# Patient Record
Sex: FEMALE | Race: NATIVE HAWAIIAN OR OTHER PACIFIC ISLANDER | NOT HISPANIC OR LATINO | Employment: PART TIME | URBAN - METROPOLITAN AREA
[De-identification: names, ages, dates, MRNs, and addresses within clinical notes are randomized per-mention and may not be internally consistent; named-entity substitution may affect disease eponyms.]

---

## 2024-11-12 ENCOUNTER — HOSPITAL ENCOUNTER (OUTPATIENT)
Dept: RADIOLOGY | Facility: MEDICAL CENTER | Age: 24
End: 2024-11-12
Attending: NURSE PRACTITIONER
Payer: COMMERCIAL

## 2024-11-12 DIAGNOSIS — R59.1 LYMPHADENOPATHY: ICD-10-CM

## 2024-11-12 LAB — PATHOLOGY CONSULT NOTE: NORMAL

## 2024-11-12 PROCEDURE — 88342 IMHCHEM/IMCYTCHM 1ST ANTB: CPT

## 2024-11-12 PROCEDURE — 88305 TISSUE EXAM BY PATHOLOGIST: CPT

## 2024-11-12 PROCEDURE — 88341 IMHCHEM/IMCYTCHM EA ADD ANTB: CPT | Mod: 91

## 2024-11-12 PROCEDURE — 88365 INSITU HYBRIDIZATION (FISH): CPT

## 2024-11-12 PROCEDURE — 38505 NEEDLE BIOPSY LYMPH NODES: CPT

## 2024-11-12 NOTE — PROGRESS NOTES
[facility] [place] [facility]IR    US guided core biopsy of Left cervical lymph node done by Dr. Carrion; NON-SEDATION (no H&P required as this is a NON SEDATION procedure) left Lateral aspect of neck access site, dressing CDI; one core in formalin and one core in RPMI obtained, Samples sent to the lab. Pt tolerated the procedure well. Pt hemodynamically stable pre/intra/post procedure; all questions and concerns answered prior to being d/c; patient provided with appropriate education for procedure; pt d/c home.

## 2024-11-20 ENCOUNTER — HOSPITAL ENCOUNTER (OUTPATIENT)
Dept: LAB | Facility: MEDICAL CENTER | Age: 24
End: 2024-11-20
Attending: NURSE PRACTITIONER
Payer: COMMERCIAL

## 2024-11-20 ENCOUNTER — HOSPITAL ENCOUNTER (OUTPATIENT)
Dept: HEMATOLOGY ONCOLOGY | Facility: MEDICAL CENTER | Age: 24
End: 2024-11-20
Attending: NURSE PRACTITIONER
Payer: COMMERCIAL

## 2024-11-20 VITALS
WEIGHT: 150.35 LBS | BODY MASS INDEX: 25.67 KG/M2 | OXYGEN SATURATION: 98 % | HEART RATE: 84 BPM | DIASTOLIC BLOOD PRESSURE: 72 MMHG | TEMPERATURE: 98 F | SYSTOLIC BLOOD PRESSURE: 116 MMHG | RESPIRATION RATE: 17 BRPM | HEIGHT: 64 IN

## 2024-11-20 DIAGNOSIS — C81.71 OTHER CLASSICAL HODGKIN LYMPHOMA OF LYMPH NODES OF NECK (HCC): ICD-10-CM

## 2024-11-20 LAB
ALBUMIN SERPL BCP-MCNC: 4.4 G/DL (ref 3.2–4.9)
ALBUMIN/GLOB SERPL: 1.2 G/DL
ALP SERPL-CCNC: 105 U/L (ref 30–99)
ALT SERPL-CCNC: 9 U/L (ref 2–50)
ANION GAP SERPL CALC-SCNC: 13 MMOL/L (ref 7–16)
AST SERPL-CCNC: 14 U/L (ref 12–45)
BASOPHILS # BLD AUTO: 0.4 % (ref 0–1.8)
BASOPHILS # BLD: 0.03 K/UL (ref 0–0.12)
BILIRUB SERPL-MCNC: 0.4 MG/DL (ref 0.1–1.5)
BUN SERPL-MCNC: 10 MG/DL (ref 8–22)
CALCIUM ALBUM COR SERPL-MCNC: 9.2 MG/DL (ref 8.5–10.5)
CALCIUM SERPL-MCNC: 9.5 MG/DL (ref 8.5–10.5)
CHLORIDE SERPL-SCNC: 100 MMOL/L (ref 96–112)
CO2 SERPL-SCNC: 23 MMOL/L (ref 20–33)
CREAT SERPL-MCNC: 0.59 MG/DL (ref 0.5–1.4)
EOSINOPHIL # BLD AUTO: 0.04 K/UL (ref 0–0.51)
EOSINOPHIL NFR BLD: 0.5 % (ref 0–6.9)
ERYTHROCYTE [DISTWIDTH] IN BLOOD BY AUTOMATED COUNT: 35.6 FL (ref 35.9–50)
ERYTHROCYTE [SEDIMENTATION RATE] IN BLOOD BY WESTERGREN METHOD: 21 MM/HOUR (ref 0–25)
GFR SERPLBLD CREATININE-BSD FMLA CKD-EPI: 128 ML/MIN/1.73 M 2
GLOBULIN SER CALC-MCNC: 3.6 G/DL (ref 1.9–3.5)
GLUCOSE SERPL-MCNC: 87 MG/DL (ref 65–99)
HBV CORE IGM SER QL: NONREACTIVE
HBV SURFACE AG SER QL: NONREACTIVE
HCT VFR BLD AUTO: 41.8 % (ref 37–47)
HGB BLD-MCNC: 13.8 G/DL (ref 12–16)
HIV 1+2 AB+HIV1 P24 AG SERPL QL IA: NORMAL
IMM GRANULOCYTES # BLD AUTO: 0.03 K/UL (ref 0–0.11)
IMM GRANULOCYTES NFR BLD AUTO: 0.4 % (ref 0–0.9)
LDH SERPL L TO P-CCNC: 174 U/L (ref 107–266)
LYMPHOCYTES # BLD AUTO: 1.26 K/UL (ref 1–4.8)
LYMPHOCYTES NFR BLD: 17.2 % (ref 22–41)
MCH RBC QN AUTO: 27.4 PG (ref 27–33)
MCHC RBC AUTO-ENTMCNC: 33 G/DL (ref 32.2–35.5)
MCV RBC AUTO: 82.9 FL (ref 81.4–97.8)
MONOCYTES # BLD AUTO: 0.47 K/UL (ref 0–0.85)
MONOCYTES NFR BLD AUTO: 6.4 % (ref 0–13.4)
NEUTROPHILS # BLD AUTO: 5.51 K/UL (ref 1.82–7.42)
NEUTROPHILS NFR BLD: 75.1 % (ref 44–72)
NRBC # BLD AUTO: 0 K/UL
NRBC BLD-RTO: 0 /100 WBC (ref 0–0.2)
PHOSPHATE SERPL-MCNC: 3.3 MG/DL (ref 2.5–4.5)
PLATELET # BLD AUTO: 405 K/UL (ref 164–446)
PMV BLD AUTO: 9.5 FL (ref 9–12.9)
POTASSIUM SERPL-SCNC: 4.6 MMOL/L (ref 3.6–5.5)
PROT SERPL-MCNC: 8 G/DL (ref 6–8.2)
RBC # BLD AUTO: 5.04 M/UL (ref 4.2–5.4)
SODIUM SERPL-SCNC: 136 MMOL/L (ref 135–145)
URATE SERPL-MCNC: 4.2 MG/DL (ref 1.9–8.2)
WBC # BLD AUTO: 7.3 K/UL (ref 4.8–10.8)

## 2024-11-20 PROCEDURE — 87340 HEPATITIS B SURFACE AG IA: CPT

## 2024-11-20 PROCEDURE — 84550 ASSAY OF BLOOD/URIC ACID: CPT

## 2024-11-20 PROCEDURE — 85652 RBC SED RATE AUTOMATED: CPT

## 2024-11-20 PROCEDURE — 99212 OFFICE O/P EST SF 10 MIN: CPT | Performed by: NURSE PRACTITIONER

## 2024-11-20 PROCEDURE — 85025 COMPLETE CBC W/AUTO DIFF WBC: CPT

## 2024-11-20 PROCEDURE — 36415 COLL VENOUS BLD VENIPUNCTURE: CPT

## 2024-11-20 PROCEDURE — 84100 ASSAY OF PHOSPHORUS: CPT

## 2024-11-20 PROCEDURE — 82232 ASSAY OF BETA-2 PROTEIN: CPT

## 2024-11-20 PROCEDURE — 87389 HIV-1 AG W/HIV-1&-2 AB AG IA: CPT

## 2024-11-20 PROCEDURE — 83615 LACTATE (LD) (LDH) ENZYME: CPT

## 2024-11-20 PROCEDURE — 86705 HEP B CORE ANTIBODY IGM: CPT

## 2024-11-20 PROCEDURE — 80053 COMPREHEN METABOLIC PANEL: CPT

## 2024-11-20 PROCEDURE — 99214 OFFICE O/P EST MOD 30 MIN: CPT | Performed by: NURSE PRACTITIONER

## 2024-11-20 ASSESSMENT — ENCOUNTER SYMPTOMS
NERVOUS/ANXIOUS: 1
CHILLS: 0
FEVER: 0
WEIGHT LOSS: 0
DIAPHORESIS: 0

## 2024-11-20 ASSESSMENT — PAIN SCALES - GENERAL: PAINLEVEL_OUTOF10: NO PAIN

## 2024-11-20 ASSESSMENT — FIBROSIS 4 INDEX: FIB4 SCORE: 0.29

## 2024-11-20 NOTE — PROGRESS NOTES
Geovanny Mireles is a 24 y.o. female who presents with Results ([facility] for Lymphnode BX results)          HPI    Patient seen today in follow-up for biopsy results.  She presents accompanied by her mother for today's visit.    Patient originally seen back on 10/31/2024 after referral from PCP for lymphadenopathy.  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  She thought it might have been related to an illness but the lump never went away.  Therefore she established with primary care provider and was sent for an ultrasound.  She stated at that time she was not experiencing any significant pain, or dysphagia.  She did have a little bit of tenderness just when she would touch it.  Ultrasound completed on 8/8/2024 showed several mildly enlarged lymph nodes in the left neck measuring up to 1.1 cm short axis and 3.1 cm long axis.  Statistically they were thought to be reactive but close clinical follow-up was advised.  She did follow-up with her PCP approximately 2 months later and the lump was still present.  She stated it had not gotten any smaller and was essentially either stable or slightly bigger.  She does note that it protrudes on some days when she turns her neck.  Therefore PCP sent her for CT soft tissue neck with contrast on 10/25/2024.  CT showed a 2.6 x 1.7 x 2.4 cm heterogeneous enlarged node in the left lower neck.  Additional abnormal enlarged lymph nodes noted at the level 2 and level 3 as well as left supraclavicular region.  There were also partially visualized necrotic lymph nodes/mass in the anterior mediastinum.  Radiologist did state this is suspicious for metastasis or lymphoma.  Based on these findings I did recommend moving forward with a core biopsy.    Core biopsy was completed on 11/12/2024.  Patient stated she tolerated the procedure fairly well.  She has some residual tenderness for approximately 1  "week but that has resolved.  She continues to deny any dysphagia.  Lymphadenopathy still present on physical exam.  She did develop a significant rash that was along the upper chest neck and face that lasted approximately 7 days but has resolved since she was last seen.    Pathology report was reviewed in detail which is consistent with a classic Hodgkin's lymphoma.  I discussed the results in detail with the patient and her mother today.      Allergies   Allergen Reactions    Codeine      RASH     Current Outpatient Medications on File Prior to Encounter   Medication Sig Dispense Refill    Levonorgestrel (MIRENA, 52 MG, IU) by Intrauterine route. MG unknown       No current facility-administered medications on file prior to encounter.       Review of Systems   Constitutional:  Negative for chills, diaphoresis, fever, malaise/fatigue and weight loss.   Skin:  Negative for itching and rash.        Rash and itching present x1 week on face, neck, and chest - but has resolved     Psychiatric/Behavioral:  The patient is nervous/anxious.               Objective     /72 (BP Location: Right arm, Patient Position: Sitting, BP Cuff Size: Adult)   Pulse 84   Temp 36.7 °C (98 °F) (Temporal)   Resp 17   Ht 1.626 m (5' 4.02\")   Wt 68.2 kg (150 lb 5.7 oz)   LMP 10/20/2024 (Approximate)   SpO2 98%   BMI 25.80 kg/m²      Physical Exam  Vitals reviewed.   Constitutional:       General: She is not in acute distress.     Appearance: Normal appearance. She is not diaphoretic.   HENT:      Head: Normocephalic and atraumatic.   Cardiovascular:      Rate and Rhythm: Normal rate and regular rhythm.      Heart sounds: Normal heart sounds. No murmur heard.     No friction rub. No gallop.   Pulmonary:      Effort: Pulmonary effort is normal. No respiratory distress.      Breath sounds: Normal breath sounds. No wheezing.   Musculoskeletal:         General: Normal range of motion.   Lymphadenopathy:      Head:      Right side of " head: No submental, submandibular, tonsillar, preauricular, posterior auricular or occipital adenopathy.      Left side of head: No submental, submandibular, tonsillar, preauricular, posterior auricular or occipital adenopathy.      Cervical: Cervical adenopathy present.      Right cervical: No superficial, deep or posterior cervical adenopathy.     Left cervical: Superficial cervical adenopathy and deep cervical adenopathy present. No posterior cervical adenopathy.      Upper Body:      Right upper body: No supraclavicular adenopathy.      Left upper body: No supraclavicular adenopathy.   Skin:     General: Skin is warm and dry.   Neurological:      Mental Status: She is alert and oriented to person, place, and time.   Psychiatric:         Mood and Affect: Mood normal.         Behavior: Behavior normal.            IMAGING   CT-SOFT TISSUE NECK WITH      Result Date: 10/25/2024  10/25/2024 8:50 AM HISTORY/REASON FOR EXAM:  lymphadenopathy TECHNIQUE/EXAM DESCRIPTION AND NUMBER OF VIEWS:  CT soft tissue neck with contrast. The study was performed on a helical multidetector CT scanner. Contiguous thin section helical images were obtained of the neck from the skull base through the thoracic inlet. 80 mL of Omnipaque 350 nonionic contrast was injected intravenously. Low dose optimization technique was utilized for this CT exam including automated exposure control and adjustment of the mA and/or kV according to patient size. COMPARISON: None. FINDINGS: PARANASAL SINUSES: The paranasal sinuses are clear. CERVICAL spine: There is no significant cervical spine abnormality. NODES: There is a 2.6 x 1.7 x 2.4 cm heterogeneous enlarged lymph nodes in the left lower neck. Additional abnormal enlarged lymph nodes in the left level 2, level 3 and supraclavicular region as well. SALIVARY and THYROID gland: The parotid, submandibular, and thyroid gland are normal in appearance. AIRWAY: The airway appears patent. MEDIASTINUM: There  are partially visualized necrotic lymph nodes/mass in the anterior mediastinum LUNGS: The visualized portions of lungs are clear.      1. There is a 2.6 x 1.7 x 2.4 cm heterogeneous enlarged lymph nodes in the left lower neck. Additional abnormal enlarged lymph nodes in the left level 2, level 3 and left supraclavicular region as well. These are suspicious for metastasis or lymphoma. 2. There are partially visualized necrotic lymph nodes/mass in the anterior mediastinum         US-SOFT TISSUES OF HEAD - NECK    08/08/2024     IMPRESSION:  Several mildly enlarged lymph nodes in the left neck, in the areas of palpable concern. They are statistically reactive rather than neoplastic, and close clinical follow-up is advised.       PATHOLOGY  FINAL DIAGNOSIS:   11/12/24  A. Left cervical lymph node:          Lymph node cores demonstrating classic Hodgkin lymphoma with           bands of fibrosis noted.     PRECURSOR AND MATURE LYMPHOID MALIGNANCIES     TUMOR    Site(s) of Tumor Involvement in Sample:  Lymph node     Final Integrated Diagnosis:  Classic Hodgkin lymphoma   SPECIAL STUDIES     Immunohistochemistry:  Please refer to microscopic description below     for details.     Flow Cytometry:  No aberrancy detected at level of sensitivity of     assay     Conventional Cytogenetics:  Not performed     Fluorescence in situ Hybridization:  Not performed     Molecular Alterations Detected:  Not performed.     Comment: This case has been reviewed by a second pathologist, Dr. Ivan, who concurs with the diagnosis.          Assessment & Plan     1. Other classical Hodgkin lymphoma of lymph nodes of neck (HCC)  BETA-2-MICROGLOBULIN    CBC WITH DIFFERENTIAL    Comp Metabolic Panel    [id number] BASE TO MID-THIGH    EC-ECHOCARDIOGRAM COMPLETE W/O CONT    HEP B CORE AB IGM    HEP B SURFACE ANTIGEN    HEPATITIS C RNA QUANT.    HIV AG/AB COMBO ASSAY SCREENING    LDH    PHOSPHORUS    URIC ACID    Referral to Hematology  Oncology    Sed Rate              Patient new diagnosis of classic Hodgkin's lymphoma.  Discussed in detail with patient and mother the recommendation for further staging workup and preparation for treatment includin.  Referral placed to Dr. Silver, medical oncology -patient to see Dr. Silver on 2024  2.  PET/CT ordered to complete staging workup -scheduled for 2024  3.  Echocardiogram -2024  4.  Multiple labs ordered at the request of Dr. Silver in preparation for upcoming treatment as noted above.    Discussed in detail with the patient and mother the recommendations.  She did verbalize understanding's and agreement with the plan.      Please note that this dictation was created using voice recognition software. I have made every reasonable attempt to correct obvious errors, but I expect that there are errors of grammar and possibly content that I did not discover before finalizing the note.

## 2024-11-21 ENCOUNTER — HOSPITAL ENCOUNTER (OUTPATIENT)
Dept: CARDIOLOGY | Facility: MEDICAL CENTER | Age: 24
End: 2024-11-21
Attending: NURSE PRACTITIONER
Payer: COMMERCIAL

## 2024-11-21 ENCOUNTER — HOSPITAL ENCOUNTER (OUTPATIENT)
Dept: LAB | Facility: MEDICAL CENTER | Age: 24
End: 2024-11-21
Attending: NURSE PRACTITIONER
Payer: COMMERCIAL

## 2024-11-21 DIAGNOSIS — C81.71 OTHER CLASSICAL HODGKIN LYMPHOMA OF LYMPH NODES OF NECK (HCC): ICD-10-CM

## 2024-11-21 LAB
LV EJECT FRACT  99904: 65
LV EJECT FRACT MOD 2C 99903: 61.52
LV EJECT FRACT MOD 4C 99902: 68.6
LV EJECT FRACT MOD BP 99901: 65.07

## 2024-11-21 PROCEDURE — 36415 COLL VENOUS BLD VENIPUNCTURE: CPT

## 2024-11-21 PROCEDURE — 93306 TTE W/DOPPLER COMPLETE: CPT | Mod: 26 | Performed by: INTERNAL MEDICINE

## 2024-11-21 PROCEDURE — 93306 TTE W/DOPPLER COMPLETE: CPT

## 2024-11-21 PROCEDURE — 87522 HEPATITIS C REVRS TRNSCRPJ: CPT

## 2024-11-22 LAB — B2 MICROGLOB SERPL-MCNC: 1.4 MG/L (ref 0.8–2.4)

## 2024-11-25 ENCOUNTER — HOSPITAL ENCOUNTER (OUTPATIENT)
Dept: RADIOLOGY | Facility: MEDICAL CENTER | Age: 24
End: 2024-11-25
Attending: NURSE PRACTITIONER
Payer: COMMERCIAL

## 2024-11-25 DIAGNOSIS — C81.71 OTHER CLASSICAL HODGKIN LYMPHOMA OF LYMPH NODES OF NECK (HCC): ICD-10-CM

## 2024-11-25 LAB — GLUCOSE BLD-MCNC: 84 MG/DL (ref 65–99)

## 2024-11-25 PROCEDURE — A9552 F18 FDG: HCPCS

## 2024-11-27 LAB
HCV RNA SERPL NAA+PROBE-ACNC: NOT DETECTED IU/ML
HCV RNA SERPL NAA+PROBE-LOG IU: NOT DETECTED LOG IU/ML
HCV RNA SERPL QL NAA+PROBE: NOT DETECTED

## 2024-12-01 ASSESSMENT — ENCOUNTER SYMPTOMS
TINGLING: 0
CHILLS: 0
DIARRHEA: 0
HEADACHES: 0
NAUSEA: 0
INSOMNIA: 0
FEVER: 0
BLOOD IN STOOL: 0
WHEEZING: 0
SHORTNESS OF BREATH: 0
BRUISES/BLEEDS EASILY: 0
DIZZINESS: 0
ABDOMINAL PAIN: 0
DOUBLE VISION: 0
BACK PAIN: 0
WEIGHT LOSS: 0
SINUS PAIN: 0
HEARTBURN: 0
MYALGIAS: 0
SORE THROAT: 0
WEAKNESS: 0
BLURRED VISION: 0
CONSTIPATION: 0
SPUTUM PRODUCTION: 0
COUGH: 0
PALPITATIONS: 0
NERVOUS/ANXIOUS: 0
VOMITING: 0
ORTHOPNEA: 0

## 2024-12-02 ENCOUNTER — HOSPITAL ENCOUNTER (OUTPATIENT)
Dept: HEMATOLOGY ONCOLOGY | Facility: MEDICAL CENTER | Age: 24
End: 2024-12-02
Attending: STUDENT IN AN ORGANIZED HEALTH CARE EDUCATION/TRAINING PROGRAM
Payer: COMMERCIAL

## 2024-12-02 VITALS
HEIGHT: 64 IN | OXYGEN SATURATION: 99 % | WEIGHT: 149.8 LBS | SYSTOLIC BLOOD PRESSURE: 110 MMHG | TEMPERATURE: 97.9 F | BODY MASS INDEX: 25.57 KG/M2 | HEART RATE: 94 BPM | DIASTOLIC BLOOD PRESSURE: 62 MMHG

## 2024-12-02 DIAGNOSIS — C81.92 HODGKIN LYMPHOMA OF INTRATHORACIC LYMPH NODES, UNSPECIFIED HODGKIN LYMPHOMA TYPE (HCC): ICD-10-CM

## 2024-12-02 PROCEDURE — 99212 OFFICE O/P EST SF 10 MIN: CPT | Performed by: STUDENT IN AN ORGANIZED HEALTH CARE EDUCATION/TRAINING PROGRAM

## 2024-12-02 PROCEDURE — 99215 OFFICE O/P EST HI 40 MIN: CPT | Performed by: STUDENT IN AN ORGANIZED HEALTH CARE EDUCATION/TRAINING PROGRAM

## 2024-12-02 RX ORDER — ONDANSETRON 2 MG/ML
4 INJECTION INTRAMUSCULAR; INTRAVENOUS PRN
Status: CANCELLED | OUTPATIENT
Start: 2024-12-16

## 2024-12-02 RX ORDER — 0.9 % SODIUM CHLORIDE 0.9 %
10 VIAL (ML) INJECTION PRN
Status: CANCELLED | OUTPATIENT
Start: 2024-12-15

## 2024-12-02 RX ORDER — 0.9 % SODIUM CHLORIDE 0.9 %
VIAL (ML) INJECTION PRN
Status: CANCELLED | OUTPATIENT
Start: 2024-12-16

## 2024-12-02 RX ORDER — PROCHLORPERAZINE MALEATE 10 MG
10 TABLET ORAL EVERY 6 HOURS PRN
OUTPATIENT
Start: 2024-12-30

## 2024-12-02 RX ORDER — EPINEPHRINE 1 MG/ML(1)
0.5 AMPUL (ML) INJECTION PRN
Status: CANCELLED | OUTPATIENT
Start: 2024-12-16

## 2024-12-02 RX ORDER — DEXAMETHASONE SODIUM PHOSPHATE 4 MG/ML
12 INJECTION, SOLUTION INTRA-ARTICULAR; INTRALESIONAL; INTRAMUSCULAR; INTRAVENOUS; SOFT TISSUE ONCE
OUTPATIENT
Start: 2024-12-30 | End: 2024-12-30

## 2024-12-02 RX ORDER — ACETAMINOPHEN 325 MG/1
650 TABLET ORAL ONCE
OUTPATIENT
Start: 2024-12-30 | End: 2024-12-30

## 2024-12-02 RX ORDER — ONDANSETRON 8 MG/1
8 TABLET, ORALLY DISINTEGRATING ORAL PRN
OUTPATIENT
Start: 2024-12-30

## 2024-12-02 RX ORDER — DIPHENHYDRAMINE HYDROCHLORIDE 50 MG/ML
50 INJECTION INTRAMUSCULAR; INTRAVENOUS PRN
Status: CANCELLED | OUTPATIENT
Start: 2024-12-16

## 2024-12-02 RX ORDER — PROCHLORPERAZINE MALEATE 10 MG
10 TABLET ORAL EVERY 6 HOURS PRN
Status: CANCELLED | OUTPATIENT
Start: 2024-12-16

## 2024-12-02 RX ORDER — 0.9 % SODIUM CHLORIDE 0.9 %
3 VIAL (ML) INJECTION PRN
Status: CANCELLED | OUTPATIENT
Start: 2024-12-16

## 2024-12-02 RX ORDER — SODIUM CHLORIDE 9 MG/ML
INJECTION, SOLUTION INTRAVENOUS CONTINUOUS
Status: CANCELLED | OUTPATIENT
Start: 2024-12-16

## 2024-12-02 RX ORDER — 0.9 % SODIUM CHLORIDE 0.9 %
3 VIAL (ML) INJECTION PRN
Status: CANCELLED | OUTPATIENT
Start: 2024-12-15

## 2024-12-02 RX ORDER — SODIUM CHLORIDE 9 MG/ML
INJECTION, SOLUTION INTRAVENOUS CONTINUOUS
OUTPATIENT
Start: 2024-12-30

## 2024-12-02 RX ORDER — ONDANSETRON 8 MG/1
8 TABLET, ORALLY DISINTEGRATING ORAL PRN
Status: CANCELLED | OUTPATIENT
Start: 2024-12-16

## 2024-12-02 RX ORDER — 0.9 % SODIUM CHLORIDE 0.9 %
VIAL (ML) INJECTION PRN
OUTPATIENT
Start: 2024-12-30

## 2024-12-02 RX ORDER — DIPHENHYDRAMINE HYDROCHLORIDE 50 MG/ML
25 INJECTION INTRAMUSCULAR; INTRAVENOUS ONCE
OUTPATIENT
Start: 2024-12-30 | End: 2024-12-30

## 2024-12-02 RX ORDER — EPINEPHRINE 1 MG/ML(1)
0.5 AMPUL (ML) INJECTION PRN
OUTPATIENT
Start: 2024-12-30

## 2024-12-02 RX ORDER — METHYLPREDNISOLONE SODIUM SUCCINATE 125 MG/2ML
125 INJECTION, POWDER, LYOPHILIZED, FOR SOLUTION INTRAMUSCULAR; INTRAVENOUS PRN
Status: CANCELLED | OUTPATIENT
Start: 2024-12-16

## 2024-12-02 RX ORDER — 0.9 % SODIUM CHLORIDE 0.9 %
VIAL (ML) INJECTION PRN
Status: CANCELLED | OUTPATIENT
Start: 2024-12-15

## 2024-12-02 RX ORDER — 0.9 % SODIUM CHLORIDE 0.9 %
10 VIAL (ML) INJECTION PRN
OUTPATIENT
Start: 2024-12-30

## 2024-12-02 RX ORDER — DIPHENHYDRAMINE HYDROCHLORIDE 50 MG/ML
25 INJECTION INTRAMUSCULAR; INTRAVENOUS ONCE
Status: CANCELLED | OUTPATIENT
Start: 2024-12-16 | End: 2024-12-16

## 2024-12-02 RX ORDER — 0.9 % SODIUM CHLORIDE 0.9 %
3 VIAL (ML) INJECTION PRN
OUTPATIENT
Start: 2024-12-30

## 2024-12-02 RX ORDER — ONDANSETRON 2 MG/ML
4 INJECTION INTRAMUSCULAR; INTRAVENOUS PRN
OUTPATIENT
Start: 2024-12-30

## 2024-12-02 RX ORDER — PALONOSETRON 0.05 MG/ML
0.25 INJECTION, SOLUTION INTRAVENOUS ONCE
OUTPATIENT
Start: 2024-12-30 | End: 2024-12-30

## 2024-12-02 RX ORDER — DIPHENHYDRAMINE HYDROCHLORIDE 50 MG/ML
50 INJECTION INTRAMUSCULAR; INTRAVENOUS PRN
OUTPATIENT
Start: 2024-12-30

## 2024-12-02 RX ORDER — DEXAMETHASONE SODIUM PHOSPHATE 4 MG/ML
12 INJECTION, SOLUTION INTRA-ARTICULAR; INTRALESIONAL; INTRAMUSCULAR; INTRAVENOUS; SOFT TISSUE ONCE
Status: CANCELLED | OUTPATIENT
Start: 2024-12-16 | End: 2024-12-16

## 2024-12-02 RX ORDER — 0.9 % SODIUM CHLORIDE 0.9 %
10 VIAL (ML) INJECTION PRN
Status: CANCELLED | OUTPATIENT
Start: 2024-12-16

## 2024-12-02 RX ORDER — PALONOSETRON 0.05 MG/ML
0.25 INJECTION, SOLUTION INTRAVENOUS ONCE
Status: CANCELLED | OUTPATIENT
Start: 2024-12-16 | End: 2024-12-16

## 2024-12-02 RX ORDER — ACETAMINOPHEN 325 MG/1
650 TABLET ORAL ONCE
Status: CANCELLED | OUTPATIENT
Start: 2024-12-16 | End: 2024-12-16

## 2024-12-02 RX ORDER — METHYLPREDNISOLONE SODIUM SUCCINATE 125 MG/2ML
125 INJECTION, POWDER, LYOPHILIZED, FOR SOLUTION INTRAMUSCULAR; INTRAVENOUS PRN
OUTPATIENT
Start: 2024-12-30

## 2024-12-02 ASSESSMENT — ENCOUNTER SYMPTOMS: DIAPHORESIS: 1

## 2024-12-02 ASSESSMENT — FIBROSIS 4 INDEX: FIB4 SCORE: 0.29

## 2024-12-02 ASSESSMENT — PAIN SCALES - GENERAL: PAINLEVEL_OUTOF10: NO PAIN

## 2024-12-02 NOTE — PROGRESS NOTES
Consult:  Hematology/Oncology      Referring Physician: MALISSA Suarez  Primary Care:  Margie Rhodes P.A.-C.    Diagnosis: Classical Hodgkin's Lymphoma    Chief Complaint:  Evaluation for systemic therapy    History of Presenting Illness:  Albert Gillette is a 24 y.o. [place] woman who presents to the clinic for evaluation for systemic therapy for a new diagnosis of classical Hodgkin's lymphoma. She noticed a lump in her neck at the beginning of August and ultimately was referred to our intake oncology coordinator after US showed lymphadenopathy in the left neck. She had it monitored and they did not change in size at all, but did protrude with certain movements. She also reports having significant night sweats and fatigue over the past several months. She was sent for a CT scan of the soft tissue/neck which revealed more nodes in the anterior mediastinum. She ultimately went through a core biopsy which revealed classical Hodgkin's lymphoma. She underwent a PET scan which revealed stage IIA disease. She presents for evaluation accordingly.     Interval History:  Patient is here for consultation. She continues to have night sweats and fatigue but otherwise is feeling okay. She understands the disease process and is ready for next steps with potential treatment.     Past Medical History:   Diagnosis Date    Cancer (HCC)     Lymphoma (HCC)        Past Surgical History:   Procedure Laterality Date    DENTAL EXTRACTION(S) Bilateral     [place]       Social History     Socioeconomic History    Marital status: Single     Spouse name: Not on file    Number of children: Not on file    Years of education: Not on file    Highest education level: Not on file   Occupational History    Not on file   Tobacco Use    Smoking status: Never    Smokeless tobacco: Never   Vaping Use    Vaping status: Never Used   Substance and Sexual Activity    Alcohol use: Yes     Comment: occ    Drug use: Not Currently     Types:  Inhaled, Marijuana     Comment: Stopped a month or two ago    Sexual activity: Yes     Birth control/protection: I.U.D.     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       Allergies as of 12/02/2024 - Reviewed 12/02/2024   Allergen Reaction Noted    Codeine  07/24/2024         Current Outpatient Medications:     Levonorgestrel (MIRENA, 52 MG, IU), by Intrauterine route. MG unknown, Disp: , Rfl:     Review of Systems:  Review of Systems   Constitutional:  Positive for diaphoresis and malaise/fatigue. Negative for chills, fever and weight loss.   HENT:  Negative for hearing loss, nosebleeds, sinus pain and sore throat.    Eyes:  Negative for blurred vision and double vision.   Respiratory:  Negative for cough, sputum production, shortness of breath and wheezing.    Cardiovascular:  Negative for chest pain, palpitations, orthopnea and leg swelling.   Gastrointestinal:  Negative for abdominal pain, blood in stool, constipation, diarrhea, heartburn, melena, nausea and vomiting.   Genitourinary:  Negative for dysuria, frequency, hematuria and urgency.   Musculoskeletal:  Negative for back pain, joint pain and myalgias.   Skin:  Negative for rash.   Neurological:  Negative for dizziness, tingling, weakness and headaches.   Endo/Heme/Allergies:  Does not bruise/bleed easily.  "  Psychiatric/Behavioral:  The patient is not nervous/anxious and does not have insomnia.           Physical Exam:  Vitals:    12/02/24 1546   BP: 110/62   Pulse: 94   Temp: 36.6 °C (97.9 °F)   TempSrc: Temporal   SpO2: 99%   Weight: 67.9 kg (149 lb 12.8 oz)   Height: 1.626 m (5' 4.02\")       DESC; KARNOFSKY SCALE WITH ECOG EQUIVALENT: 100, Fully active, able to carry on all pre-disease performed without restriction (ECOG equivalent 0)    DISTRESS LEVEL: no apparent distress    Physical Exam  Vitals and nursing note reviewed.   Constitutional:       General: She is awake. She is not in acute distress.     Appearance: Normal appearance. She is normal weight. She is not ill-appearing, toxic-appearing or diaphoretic.   HENT:      Head: Normocephalic and atraumatic.      Nose: Nose normal. No congestion.      Mouth/Throat:      Pharynx: Oropharynx is clear. No oropharyngeal exudate or posterior oropharyngeal erythema.   Eyes:      General: No scleral icterus.     Extraocular Movements: Extraocular movements intact.      Conjunctiva/sclera: Conjunctivae normal.      Pupils: Pupils are equal, round, and reactive to light.   Cardiovascular:      Rate and Rhythm: Normal rate and regular rhythm.      Pulses: Normal pulses.      Heart sounds: Normal heart sounds. No murmur heard.     No friction rub. No gallop.   Pulmonary:      Effort: Pulmonary effort is normal.      Breath sounds: Normal breath sounds. No decreased air movement. No wheezing, rhonchi or rales.   Abdominal:      General: Bowel sounds are normal. There is no distension.      Tenderness: There is no abdominal tenderness.   Musculoskeletal:         General: No deformity. Normal range of motion.      Cervical back: Normal range of motion and neck supple. No tenderness.      Right lower leg: No edema.      Left lower leg: No edema.   Lymphadenopathy:      Cervical: Cervical adenopathy present.      Right cervical: No superficial cervical adenopathy.     Left " cervical: Superficial cervical adenopathy present.      Upper Body:      Right upper body: No axillary adenopathy.      Left upper body: No axillary adenopathy.      Lower Body: No right inguinal adenopathy. No left inguinal adenopathy.   Skin:     General: Skin is warm and dry.      Coloration: Skin is not jaundiced.      Findings: No erythema or rash.   Neurological:      General: No focal deficit present.      Mental Status: She is alert and oriented to person, place, and time.      Sensory: Sensation is intact.      Motor: Motor function is intact. No weakness.      Gait: Gait is intact.   Psychiatric:         Attention and Perception: Attention normal.         Mood and Affect: Mood normal.         Behavior: Behavior normal. Behavior is cooperative.         Thought Content: Thought content normal.         Judgment: Judgment normal.          Depression Screening    Little interest or pleasure in doing things?      Feeling down, depressed , or hopeless?     Trouble falling or staying asleep, or sleeping too much?      Feeling tired or having little energy?      Poor appetite or overeating?      Feeling bad about yourself - or that you are a failure or have let yourself or your family down?     Trouble concentrating on things, such as reading the newspaper or watching television?     Moving or speaking so slowly that other people could have noticed.  Or the opposite - being so fidgety or restless that you have been moving around a lot more than usual?      Thoughts that you would be better off dead, or of hurting yourself?      Patient Health Questionnaire Score:         If depressive symptoms identified deferred to follow up visit unless specifically addressed in assesment and plan.    Interpretation of PHQ-9 Total Score   Score Severity   1-4 No Depression   5-9 Mild Depression   10-14 Moderate Depression   15-19 Moderately Severe Depression   20-27 Severe Depression    Labs:  No visits with results within 7  Day(s) from this visit.   Latest known visit with results is:   Hospital Outpatient Visit on 11/25/2024   Component Date Value Ref Range Status    Glucose - Accu-Ck 11/25/2024 84  65 - 99 mg/dL Final       Imaging:   All listed images below have been independently reviewed by me. I agree with the findings as summarized below:    HE-[id number]-[id number] BASE TO MID-THIGH    Result Date: 11/25/2024 11/25/2024 8:15 AM HISTORY/REASON FOR EXAM:  new diagnosis of lymphoma; New diagnosis of Hodgkin's Lymphoma TECHNIQUE/EXAM DESCRIPTION AND NUMBER OF VIEWS: PET body imaging. Initially, 9.55 mCi F-18 FDG was administered intravenously under standardized conditions. Approximately 45 minutes after FDG administration, the patient was placed in the supine position on the PET CT table. Blood glucose level was 84 mg/dL. Low dose spiral CT imaging was performed from the skull apex to the mid thighs. PET imaging was then performed from the skull apex to the mid thighs. CT images, PET images, and PET/CT fused images were reviewed on a PACS 3D workstation. The limited non-contrast CT data are used primarily for attenuation correction and anatomic correlation.  Evaluation of solid organs and bowel are especially limited utilizing this technique. COMPARISON: CT soft tissue neck 10/25/2024 FINDINGS: Head and neck: Multiple hypermetabolic LEFT lower cervical and bilateral supraclavicular lymph nodes, max SUV 17.88. Chest: Hypermetabolic prevascular and superior mediastinal lymph nodes, max SUV 11.63.  Increased soft tissue in the anterior mediastinum with heterogeneous increased activity, max SUV 8.63.  No abnormal activity in the lungs. Abdomen and pelvis: No abnormal focal FDG activity.  Physiologic uptake uptake in the endometrium. Musculoskeletal: No abnormal focal FDG activity. Incidental findings on CT: Multiple enlarged supraclavicular lymph nodes, primarily on the LEFT.  Prominent thymus versus anterior mediastinal mass.     1.  Multiple  hypermetabolic LEFT lower cervical, bilateral supraclavicular and mediastinal lymph nodes consistent with tumor in this patient with Hodgkin's lymphoma. 2.  Probable thymic involvement. 3.  No abnormal metabolic activity in the abdomen or pelvis. Deauville score of 5    EC-ECHOCARDIOGRAM COMPLETE W/O CONT    Result Date: 2024  Transthoracic Echo Report Echocardiography Laboratory CONCLUSIONS No prior study is available for comparison. Normal left ventricular systolic function. The left ventricular ejection fraction is visually estimated to be 60- 65%. No significant valvular abnormalities. DYLON GAMBLE Exam Date:         2024                    15:13 Exam Location:     Out Patient Priority:          Routine Ordering Physician:        BALA INIGUEZ Referring Physician:       [id number] Hatfield Sonographer:               Neymar Quiñones Age:    24     Gender:    F MRN:    [id number] :    2000 BSA:    1.73   Ht (in):    64     Wt (lb):    150 Exam Type:     Complete Indications:     New diagnosis of Hodgkin's Lymphoma - need for chemo ICD Codes:       C81.7 CPT Codes:       82273 BP:   131    /   82     HR:   82 Technical Quality:       Fair MEASUREMENTS  (Male / Female) Normal Values 2D ECHO LV Diastolic Diameter PLAX        3.8 cm                4.2 - 5.9 / 3.9 - 5.3 cm LV Systolic Diameter PLAX         2.7 cm                2.1 - 4.0 cm IVS Diastolic Thickness           1.1 cm                LVPW Diastolic Thickness          1.1 cm                LVOT Diameter                     2.2 cm                Estimated LV Ejection Fraction    65 %                  LV Ejection Fraction MOD BP       65.1 %                >= 55  % LV Ejection Fraction MOD 4C       68.6 %                LV Ejection Fraction MOD 2C       61.5 %                IVC Diameter                      1.5 cm                DOPPLER AV Peak Velocity                  1.3 m/s               AV Peak Gradient                  7 mmHg                 AV Mean Gradient                  3.4 mmHg              LVOT Peak Velocity                1.1 m/s               AV Area Cont Eq vti               3.2 cm2               Mitral E Point Velocity           0.98 m/s              Mitral E to A Ratio               1.6                   MV Pressure Half Time             59.6 ms               MV Area PHT                       3.7 cm2               MV Deceleration Time              206 ms                PV Peak Velocity                  1.1 m/s               PV Peak Gradient                  5.2 mmHg              RVOT Peak Velocity                0.92 m/s              * Indicates values subject to auto-interpretation LV EF:  65    % FINDINGS Left Ventricle Normal left ventricular chamber size. Normal left ventricular wall thickness. Normal left ventricular systolic function. The left ventricular ejection fraction is visually estimated to be 60-65%. The ejection fraction is measured to be 65% by Alvarez's biplane. Normal regional wall motion. Normal diastolic function. Right Ventricle Normal right ventricular size. Normal right ventricular systolic function. Right Atrium Normal right atrial size. Left Atrium Normal left atrial size. Left atrial volume index is 24 mL/sq m. Mitral Valve Structurally normal mitral valve. No mitral stenosis. No mitral regurgitation. Aortic Valve Structurally normal aortic valve. No aortic valve stenosis. No aortic insufficiency. Tricuspid Valve Structurally normal tricuspid valve. No tricuspid stenosis. No tricuspid regurgitation. Right atrial pressure is estimated to be 3 mmHg. Unable to estimate pulmonary artery pressure due to an inadequate tricuspid regurgitant jet. Pulmonic Valve Structurally normal pulmonic valve. No pulmonic stenosis. Trace pulmonic insufficiency. Pericardium No pericardial effusion. Aorta Normal aortic root for body surface area. The ascending aorta diameter is 2.5 cm. Alonso Armendariz M.D. (Electronically Signed)  Final Date:     21 November 2024                 15:46    IR-NEEDLE CORE BX-LYMPH NODE    Result Date: 11/14/2024 11/12/2024 10:00 AM HISTORY/REASON FOR EXAM:  left neck adenopathy; left neck adenopathy concerning for malignancy - Lymphoma high on the differential . Procedure: After the informed consent the patient was placed on the ultrasound table on supine position. Ultrasound examination of the neck demonstrates enlarged left supraclavicular lymph nodes. After injecting lidocaine a 17-gauge core biopsy needle was advanced into the one of the supraclavicular lymph node. After confirming the needle position, 2 core biopsies were obtained. The materials were sent to the pathology in the formalin and RPMI container. The patient tolerated the procedure without any immediate complication.     Successful core biopsy of enlarged left supraclavicular lymphadenopathy.       Pathology:  FINAL DIAGNOSIS:     A. Left cervical lymph node:          Lymph node cores demonstrating classic Hodgkin lymphoma with           bands of fibrosis noted.     PRECURSOR AND MATURE LYMPHOID MALIGNANCIES     TUMOR    Site(s) of Tumor Involvement in Sample:  Lymph node     Final Integrated Diagnosis:  Classic Hodgkin lymphoma   SPECIAL STUDIES     Immunohistochemistry:  Please refer to microscopic description below     for details.     Flow Cytometry:  No aberrancy detected at level of sensitivity of     assay     Conventional Cytogenetics:  Not performed     Fluorescence in situ Hybridization:  Not performed     Molecular Alterations Detected:  Not performed.     Comment: This case has been reviewed by a second pathologist, Dr. Ivan, who concurs with the diagnosis.                                         Diagnosis performed by:                                       BETSEY BLACK MD     Assessment & Plan:  1. Hodgkin lymphoma of intrathoracic lymph nodes, unspecified Hodgkin lymphoma type (HCC)  REFERRAL TO ONCOLOGY NURSE NAVIGATOR     Referral to Other    IR-CVC PORT PLACEMENT > AGE 5    PULMONARY FUNCTION TESTS -Test requested: Complete Pulmonary Function Test          This is a 24 year old [place] woman with stage IIB classical Hodgkin's lymphoma. She presents for evaluation.     Current Diagnosis and Staging: Classical Hodgkin's lymphoma, stage IIB    Treatment Plan: ABVD x 2 cycles, and then re-evaluation with PET scan. Depending on response, patient may be able to do 2 more cycles of ABVD and then radiation therapy, vs 4 more cycles of AVD. .     Treatment Citation: NCCN    Plan of Care:    Primary Therapy: C1 ABVD to start ASAP.   Supportive Therapy: Antiemetics per protocol  Toxicity: Patient is getting antineoplastic therapy and needs monitoring of blood counts, hepatic function, and renal function due to potential for organ dysfunction.   Labs: CBC with diff, CMP, LDH monitoring  Imaging: Repeat PET scan after C2  Treatment Planning: Patient will start therapy with ABVD x 2 cycles and then reassess with PET scan. Based on response, patient could continue with 2 more cycles of ABVD and radiation therapy, or 4 more cycles of AVD, to complete therapy.   Consultations: Surgical oncology (Dr. Navarro)  Code Status: Full  Miscellaneous: NA  Return for Follow Up: For start of therapy    Any questions and concerns raised by the patient were answered to the best of my ability. Thank you for allowing me to participate in the care for this patient. Please feel free to contact me for any questions or concerns.     Total time spent on chart review, clinic encounter, and documentation: 62 minutes.

## 2024-12-03 NOTE — ADDENDUM NOTE
Encounter addended by: Paola Frederick, Med Ass't on: 12/3/2024 7:49 AM   Actions taken: Charge Capture section accepted

## 2024-12-04 ENCOUNTER — APPOINTMENT (OUTPATIENT)
Dept: ADMISSIONS | Facility: MEDICAL CENTER | Age: 24
End: 2024-12-04
Attending: STUDENT IN AN ORGANIZED HEALTH CARE EDUCATION/TRAINING PROGRAM
Payer: COMMERCIAL

## 2024-12-05 ENCOUNTER — HOSPITAL ENCOUNTER (OUTPATIENT)
Dept: HEMATOLOGY ONCOLOGY | Facility: MEDICAL CENTER | Age: 24
End: 2024-12-05
Attending: STUDENT IN AN ORGANIZED HEALTH CARE EDUCATION/TRAINING PROGRAM
Payer: COMMERCIAL

## 2024-12-05 ENCOUNTER — PATIENT OUTREACH (OUTPATIENT)
Dept: ONCOLOGY | Facility: MEDICAL CENTER | Age: 24
End: 2024-12-05
Payer: COMMERCIAL

## 2024-12-05 DIAGNOSIS — Z79.899 ENCOUNTER FOR LONG-TERM (CURRENT) USE OF HIGH-RISK MEDICATION: ICD-10-CM

## 2024-12-05 DIAGNOSIS — Z65.8 PSYCHOSOCIAL DISTRESS: ICD-10-CM

## 2024-12-05 DIAGNOSIS — C81.72 OTHER CLASSICAL HODGKIN LYMPHOMA OF INTRATHORACIC LYMPH NODES (HCC): ICD-10-CM

## 2024-12-05 RX ORDER — OLANZAPINE 5 MG/1
5 TABLET ORAL NIGHTLY
Qty: 30 TABLET | Refills: 6 | Status: SHIPPED | OUTPATIENT
Start: 2024-12-14

## 2024-12-05 RX ORDER — ONDANSETRON 4 MG/1
4 TABLET, FILM COATED ORAL EVERY 4 HOURS PRN
Qty: 30 TABLET | Refills: 6 | Status: SHIPPED | OUTPATIENT
Start: 2024-12-14

## 2024-12-05 RX ORDER — PROCHLORPERAZINE MALEATE 10 MG
10 TABLET ORAL EVERY 6 HOURS PRN
Qty: 30 TABLET | Refills: 6 | Status: SHIPPED | OUTPATIENT
Start: 2024-12-14

## 2024-12-05 NOTE — PROGRESS NOTES
"[facility] Intake    Diagnosis Type:Hodgkin lymphoma  Preferred Language: English  Insurance: [employer] [place] [employer] [place]   Primary Care Provider Reviewed: yes  Last Appointment Date:\"June 2024 with Rhodes\"  Introduced Albert Gillette to Oncology Support Services and provided contact information on 12/5/2024 .  Current Barriers Identified Include: Financial   MyChart Status: Activated  Communication Preferences:MyChart & Phone calls Best Contact Number: [phone]  Patient Contact's Reviewed: yes     Outbound call to patient for [facility] intake. Patient states she has her Mom and boyfriend as her support system. Educated patient on Cancer Support Groups, reviewed the Resource Center, and patient agreed to receive [facility] Resource folder via mail. Patient states she is \"going to apply for medicaid\" soon. Administered Distress Screening, patient scored a seven stating \"finances and school\". Patient stated she is going to be quitting job once starting chemo. She also stated she is debating delaying classes. Patient stated she would like to move forward with counseling resources. Patient also stated she is behind on her electric bill and worries about rent, insurance and credit card bills. Informed patient that I would place a referral to [facility] for counseling resources and financial concerns. Patient asked if she can bring her ADA paperwork to her education appointment, informed patient that I would reach out to Medical Oncology with question and follow-up.      Outbound call to medical oncology, Evelin stated patient is welcome to bring paperwork to appointment and they ask for at least 5 business days for completion.     Outbound call to patient and provided her with ADA information. Patient reports no further needs at this time.     Mailed patient [facility] Resource Folder including ROSHNI Oliva's and MATHEUS Fong's card.   Completed [facility] referral.     Outcome:  No further needs at this " time.

## 2024-12-05 NOTE — PROGRESS NOTES
Subjective     Albert Gillette is a 24 y.o. female who presents with No chief complaint on file.            HPI    ROS           Objective     There were no vitals taken for this visit.     Physical Exam                        Assessment & Plan     1. Other classical Hodgkin lymphoma of intrathoracic lymph nodes (HCC)  ondansetron (ZOFRAN) 4 MG Tab tablet    prochlorperazine (COMPAZINE) 10 MG Tab    OLANZapine (ZYPREXA) 5 MG Tab    Consent for Non-Surgery w/o Sedation    CBC WITH DIFFERENTIAL    Comp Metabolic Panel    LDH    URIC ACID      2. Encounter for long-term (current) use of high-risk medication

## 2024-12-05 NOTE — PROGRESS NOTES
The following appointments, labs, and medications have been provided to the patient:  Line: Port to be placed  ECHO: 11/21/2024  WBC Support (g-csf): TBD  Labs: CBC, CMP, LDH, Uric Acid  Follow up appts: TBD  Chemo/immunotherapy class: 12/05/24  Chemotherapy Regimen Lymphoma: ABVD  Nausea medication: zofran/compazine/Olazapine prescribed and sent to pharmacy  Other treatment specific meds: TBD  Oral chemo follow up: N/A  Pain medication: Per provider discretion  Nurse eric: Referred on 12/2/24 Established with TBD  Dietician:  JAXSON  SW: Referred on 12/5/24  FRA: Referred on TBD    Additional info/teaching for immunotherapy patients:  If hospitalized, inform staff of immunotherapy treatment and have them contact oncologist - immunotherapy alert card provided.    Additional info/teaching for chemotherapy patients:  Neutropenia reminder card provided to patient      Additional teaching:  Port placement and care    Patient was provided with drug specific handouts and [facility] side effects sheet. Patient verbalized that questions and concerns regarding treatment have been addressed. Consent to proceed with treatment was reviewed and signed during this visit.    Spent 70 minutes of continuous, non-interrupted, face-to-face patient contact in which greater than 50% of the visit was spent counseling and coordinating of care.

## 2024-12-06 ENCOUNTER — PRE-ADMISSION TESTING (OUTPATIENT)
Dept: ADMISSIONS | Facility: MEDICAL CENTER | Age: 24
End: 2024-12-06
Attending: STUDENT IN AN ORGANIZED HEALTH CARE EDUCATION/TRAINING PROGRAM
Payer: COMMERCIAL

## 2024-12-10 ENCOUNTER — APPOINTMENT (OUTPATIENT)
Dept: RADIOLOGY | Facility: MEDICAL CENTER | Age: 24
End: 2024-12-10
Attending: STUDENT IN AN ORGANIZED HEALTH CARE EDUCATION/TRAINING PROGRAM
Payer: COMMERCIAL

## 2024-12-10 ENCOUNTER — HOSPITAL ENCOUNTER (OUTPATIENT)
Facility: MEDICAL CENTER | Age: 24
End: 2024-12-10
Attending: STUDENT IN AN ORGANIZED HEALTH CARE EDUCATION/TRAINING PROGRAM | Admitting: STUDENT IN AN ORGANIZED HEALTH CARE EDUCATION/TRAINING PROGRAM
Payer: COMMERCIAL

## 2024-12-10 VITALS
TEMPERATURE: 97 F | SYSTOLIC BLOOD PRESSURE: 106 MMHG | DIASTOLIC BLOOD PRESSURE: 68 MMHG | WEIGHT: 146.39 LBS | OXYGEN SATURATION: 97 % | RESPIRATION RATE: 17 BRPM | HEIGHT: 64 IN | HEART RATE: 96 BPM | BODY MASS INDEX: 24.99 KG/M2

## 2024-12-10 DIAGNOSIS — C81.92 HODGKIN LYMPHOMA OF INTRATHORACIC LYMPH NODES, UNSPECIFIED HODGKIN LYMPHOMA TYPE (HCC): ICD-10-CM

## 2024-12-10 LAB — HCG SERPL QL: NEGATIVE

## 2024-12-10 PROCEDURE — 160035 HCHG PACU - 1ST 60 MINS PHASE I

## 2024-12-10 PROCEDURE — 160047 HCHG PACU  - EA ADDL 30 MINS PHASE II

## 2024-12-10 PROCEDURE — 700111 HCHG RX REV CODE 636 W/ 250 OVERRIDE (IP): Performed by: RADIOLOGY

## 2024-12-10 PROCEDURE — 160002 HCHG RECOVERY MINUTES (STAT)

## 2024-12-10 PROCEDURE — 160046 HCHG PACU - 1ST 60 MINS PHASE II

## 2024-12-10 PROCEDURE — 700105 HCHG RX REV CODE 258: Performed by: RADIOLOGY

## 2024-12-10 PROCEDURE — 36415 COLL VENOUS BLD VENIPUNCTURE: CPT

## 2024-12-10 PROCEDURE — 84703 CHORIONIC GONADOTROPIN ASSAY: CPT

## 2024-12-10 PROCEDURE — 700101 HCHG RX REV CODE 250

## 2024-12-10 PROCEDURE — 700111 HCHG RX REV CODE 636 W/ 250 OVERRIDE (IP): Mod: JZ

## 2024-12-10 PROCEDURE — 99152 MOD SED SAME PHYS/QHP 5/>YRS: CPT

## 2024-12-10 RX ORDER — MIDAZOLAM HYDROCHLORIDE 1 MG/ML
.5-2 INJECTION INTRAMUSCULAR; INTRAVENOUS PRN
Status: DISCONTINUED | OUTPATIENT
Start: 2024-12-10 | End: 2024-12-10 | Stop reason: HOSPADM

## 2024-12-10 RX ORDER — LIDOCAINE HYDROCHLORIDE AND EPINEPHRINE 10; 10 MG/ML; UG/ML
INJECTION, SOLUTION INFILTRATION; PERINEURAL
Status: COMPLETED
Start: 2024-12-10 | End: 2024-12-10

## 2024-12-10 RX ORDER — OXYCODONE HYDROCHLORIDE 5 MG/1
2.5 TABLET ORAL
Status: DISCONTINUED | OUTPATIENT
Start: 2024-12-10 | End: 2024-12-10 | Stop reason: HOSPADM

## 2024-12-10 RX ORDER — ONDANSETRON 2 MG/ML
4 INJECTION INTRAMUSCULAR; INTRAVENOUS PRN
Status: DISCONTINUED | OUTPATIENT
Start: 2024-12-10 | End: 2024-12-10 | Stop reason: HOSPADM

## 2024-12-10 RX ORDER — CEFAZOLIN SODIUM 1 G/3ML
INJECTION, POWDER, FOR SOLUTION INTRAMUSCULAR; INTRAVENOUS
Status: DISCONTINUED
Start: 2024-12-10 | End: 2024-12-10 | Stop reason: HOSPADM

## 2024-12-10 RX ORDER — SODIUM CHLORIDE 9 MG/ML
500 INJECTION, SOLUTION INTRAVENOUS
Status: DISCONTINUED | OUTPATIENT
Start: 2024-12-10 | End: 2024-12-10 | Stop reason: HOSPADM

## 2024-12-10 RX ORDER — MIDAZOLAM HYDROCHLORIDE 1 MG/ML
INJECTION INTRAMUSCULAR; INTRAVENOUS
Status: COMPLETED
Start: 2024-12-10 | End: 2024-12-10

## 2024-12-10 RX ORDER — ONDANSETRON 2 MG/ML
4 INJECTION INTRAMUSCULAR; INTRAVENOUS EVERY 8 HOURS PRN
Status: DISCONTINUED | OUTPATIENT
Start: 2024-12-10 | End: 2024-12-10 | Stop reason: HOSPADM

## 2024-12-10 RX ORDER — HYDROMORPHONE HYDROCHLORIDE 1 MG/ML
0.25 INJECTION, SOLUTION INTRAMUSCULAR; INTRAVENOUS; SUBCUTANEOUS
Status: DISCONTINUED | OUTPATIENT
Start: 2024-12-10 | End: 2024-12-10 | Stop reason: HOSPADM

## 2024-12-10 RX ADMIN — FENTANYL CITRATE 50 MCG: 50 INJECTION, SOLUTION INTRAMUSCULAR; INTRAVENOUS at 10:36

## 2024-12-10 RX ADMIN — MIDAZOLAM HYDROCHLORIDE 2 MG: 1 INJECTION, SOLUTION INTRAMUSCULAR; INTRAVENOUS at 10:34

## 2024-12-10 RX ADMIN — FENTANYL CITRATE 50 MCG: 50 INJECTION, SOLUTION INTRAMUSCULAR; INTRAVENOUS at 10:35

## 2024-12-10 RX ADMIN — MIDAZOLAM HYDROCHLORIDE 1 MG: 1 INJECTION, SOLUTION INTRAMUSCULAR; INTRAVENOUS at 10:39

## 2024-12-10 RX ADMIN — FENTANYL CITRATE 50 MCG: 50 INJECTION, SOLUTION INTRAMUSCULAR; INTRAVENOUS at 10:38

## 2024-12-10 RX ADMIN — MIDAZOLAM HYDROCHLORIDE 1 MG: 1 INJECTION, SOLUTION INTRAMUSCULAR; INTRAVENOUS at 10:44

## 2024-12-10 RX ADMIN — CEFAZOLIN 2 G: 2 INJECTION, POWDER, FOR SOLUTION INTRAMUSCULAR; INTRAVENOUS at 10:25

## 2024-12-10 RX ADMIN — HEPARIN 3 ML: 100 SYRINGE at 11:02

## 2024-12-10 RX ADMIN — FENTANYL CITRATE 50 MCG: 50 INJECTION, SOLUTION INTRAMUSCULAR; INTRAVENOUS at 10:43

## 2024-12-10 RX ADMIN — LIDOCAINE HYDROCHLORIDE AND EPINEPHRINE: 10; 10 INJECTION, SOLUTION INFILTRATION; PERINEURAL at 10:39

## 2024-12-10 ASSESSMENT — FIBROSIS 4 INDEX: FIB4 SCORE: 0.29

## 2024-12-10 NOTE — OR SURGEON
Immediate Post- Operative Note    PostOp Diagnosis: VENOUS ACCESS REQUIRED FOR CHEMOTHERAPY      Procedure(s): RIGHT INTERNAL JUGULAR POWER PORT VENOUS ACCESS PLACEMENT WITH U/S AND FLUOROSCOPIC GUIDANCE    TRIM LENGTH: 20 CM      Estimated Blood Loss: <5CC      FINDINGS: CATH TIP AT [facility] [place]        Complications: NONE          12/10/2024     11:06 AM     Ricci Mello M.D.

## 2024-12-10 NOTE — OR NURSING
1110 Pt over from IR post right chest port placement. Pt awake and alert, denies pain and nausea. VSS.  1120 Tolerating orals  1130 Called and updated pt's spouse on POC  1230 Discharge instructions provided to pt. Discussed diet, activity, follow up, prescriptions and symptom management. Pt states understanding. Pt states all questions have been answered. Copy of discharge provided to pt.   1245 Criteria met. Right port site is clean, dry and soft, no signs of bleeding.  1245 Pt wheeled off of unit with all belongings by RN. Discharge instructions provided to pts spouse. All questions answered.

## 2024-12-10 NOTE — PROGRESS NOTES
Pt presents to IR2. Patient was consented by MD at bedside, confirmed by this RN and consent at bedside. Pt transferred to IR table in supine position. Patient underwent a port placement by Dr. Mello. Procedure site was marked by MD and verified using imaging guidance. Pt placed on monitor, prepped and draped in a sterile fashion. Vitals were taken every 5 minutes and remained stable during procedure (see doc flow sheet for results). CO2 waveform capnography was monitored and remained WNL throughout procedure. Pt transported by stretcher with RN to PPU.         Power port CLEARVUE 8 Fr, 20CM  REF: [id number]  LOT: [id number]  EXP: 06/30/2025

## 2024-12-10 NOTE — DISCHARGE INSTRUCTIONS
Care After Implanted Port Insertion     The following information offers guidance on how to care for yourself after your procedure. If you have problems or questions, contact your health care provider.     What can I expect after the procedure?     After the procedure, it is common to have:   Discomfort at the port insertion site.   Bruising on the skin over the port. This should improve over 3-4 days.     Follow these instructions at home:     Port care   After your port is placed, you will get a 's information card. The card has information about your port. Keep this card with you at all times.   Take care of the port as told by your health care provider. Ask your health care provider if you or a family member can get training for taking care of the port at home.   Make sure to remember what type of port you have.     Incision care   Wash your hands with soap and water for at least 20 seconds before and after you change your bandage (dressing). If soap and water are not available, use hand .   Leave the initial dressing on for 5 days   Do not shower while the bandage is in place, only take a sponge bath. If the dressing gets wet or soiled remove it and replace as needed. Once the bandage is removed you may shower, but do not scrub the incision site until it is fully healed. Let warm soapy water run over the site then pat dry.    Leave stitches (sutures), skin glue, or adhesive strips in place. These skin closures may need to stay in place for 2 weeks or longer. If adhesive strip edges start to loosen and curl up, you may trim the loose edges. Do not remove adhesive strips completely unless your health care provider tells you to do that.     Check your port insertion site every day for signs of infection. Check for:   Redness, swelling, or pain.   Fluid or blood.   Warmth.   Pus or a bad smell.     Activity   Return to your normal activities as told by your health care provider. Ask your health  care provider what activities are safe for you.   Avoid lifting anything over 10lbs, any vigorous use of your arms, and any exercises that require your arms above your shoulders or behind your back for 1 week.      General instructions   Take over-the-counter and prescription medicines only as told by your health care provider.   Do not take baths, swim, or use a hot tub until the site is fully healed . You may take a shower once the dressing is removed in 48-72 hours.   Wear a medical alert bracelet in case of an emergency. This will tell any health care providers that you have a port.   Keep all follow-up visits. This is important.     Contact a health care provider if:   You cannot flush your port with saline as directed, or you cannot draw blood from the port.   You have a fever or chills.   You have redness, swelling, or pain around your port insertion site.   You have fluid or blood coming from your port insertion site.   Your port insertion site feels warm to the touch.   You have pus or a bad smell coming from the port insertion site.     Get help right away if:   You have chest pain or shortness of breath.   You have bleeding from your port that you cannot control.   These symptoms may be an emergency. Get help right away. Call 911.     What to Expect Post Sedation    Rest and take it easy for the first 24 hours.  A responsible adult is recommended to remain with you during that time.  It is normal to feel sleepy.  We encourage you to not do anything that requires balance, judgment or coordination.    FOR 24 HOURS DO NOT:  Drive, operate machinery or run household appliances.  Drink beer or alcoholic beverages.  Make important decisions or sign legal documents.    To avoid nausea, slowly advance diet as tolerated, avoiding spicy or greasy foods for the first day.  Add more substantial food to your diet according to your provider's instructions.  INCREASE FLUIDS AND FIBER TO AVOID CONSTIPATION.    MILD  FLU-LIKE SYMPTOMS ARE NORMAL.  YOU MAY EXPERIENCE GENERALIZED MUSCLE ACHES, THROAT IRRITATION, HEADACHE AND/OR SOME NAUSEA.    If any questions arise, call your provider.  If your provider is not available, please feel free to call the [facility] at [phone].    MEDICATIONS: Resume taking daily medication.  Take prescribed pain medication with food.  If no medication is prescribed, you may take non-aspirin pain medication if needed.  PAIN MEDICATION CAN BE VERY CONSTIPATING.  Take a stool softener or laxative such as senokot, pericolace, or milk of magnesia if needed.

## 2024-12-11 ENCOUNTER — PATIENT MESSAGE (OUTPATIENT)
Dept: HEMATOLOGY ONCOLOGY | Facility: MEDICAL CENTER | Age: 24
End: 2024-12-11
Payer: COMMERCIAL

## 2024-12-13 DIAGNOSIS — Z79.899 ENCOUNTER FOR LONG-TERM (CURRENT) USE OF HIGH-RISK MEDICATION: ICD-10-CM

## 2024-12-13 DIAGNOSIS — C81.72 OTHER CLASSICAL HODGKIN LYMPHOMA OF INTRATHORACIC LYMPH NODES (HCC): ICD-10-CM

## 2024-12-13 NOTE — PATIENT COMMUNICATION
Patient presented to Medical Oncology regarding her schedule, labs and letter from MD.  All questions/concerns addressed at her visit.

## 2024-12-17 ENCOUNTER — HOSPITAL ENCOUNTER (OUTPATIENT)
Dept: PULMONOLOGY | Facility: MEDICAL CENTER | Age: 24
End: 2024-12-17
Attending: STUDENT IN AN ORGANIZED HEALTH CARE EDUCATION/TRAINING PROGRAM
Payer: COMMERCIAL

## 2024-12-17 DIAGNOSIS — C81.92 HODGKIN LYMPHOMA OF INTRATHORACIC LYMPH NODES, UNSPECIFIED HODGKIN LYMPHOMA TYPE (HCC): ICD-10-CM

## 2024-12-17 PROCEDURE — 94060 EVALUATION OF WHEEZING: CPT | Mod: 26 | Performed by: STUDENT IN AN ORGANIZED HEALTH CARE EDUCATION/TRAINING PROGRAM

## 2024-12-17 PROCEDURE — 94726 PLETHYSMOGRAPHY LUNG VOLUMES: CPT

## 2024-12-17 PROCEDURE — 94729 DIFFUSING CAPACITY: CPT | Mod: 26 | Performed by: STUDENT IN AN ORGANIZED HEALTH CARE EDUCATION/TRAINING PROGRAM

## 2024-12-17 PROCEDURE — 94726 PLETHYSMOGRAPHY LUNG VOLUMES: CPT | Mod: 26 | Performed by: STUDENT IN AN ORGANIZED HEALTH CARE EDUCATION/TRAINING PROGRAM

## 2024-12-17 PROCEDURE — 94060 EVALUATION OF WHEEZING: CPT

## 2024-12-17 PROCEDURE — 94729 DIFFUSING CAPACITY: CPT

## 2024-12-17 RX ORDER — ALBUTEROL SULFATE 5 MG/ML
2.5 SOLUTION RESPIRATORY (INHALATION)
Status: COMPLETED | OUTPATIENT
Start: 2024-12-17 | End: 2024-12-17

## 2024-12-17 RX ADMIN — ALBUTEROL SULFATE 2.5 MG: 5 SOLUTION RESPIRATORY (INHALATION) at 12:23

## 2024-12-18 NOTE — PROCEDURES
DATE OF SERVICE:  12/17/2024     PULMONARY FUNCTION TEST INTERPRETATION     IMPRESSION:    1.  There is no obstruction.  2.  There is no significant bronchodilator response.  3.  RV is elevated to 136% of unclear significance.  4.  DLCO is normal.  5.  Flow volume loop appears normal.     Normal PFTs.        ______________________________  DO JAMILA Shook/DANNIELLE    DD:  12/17/2024 16:02  DT:  12/17/2024 16:58    Job#:  [id number]

## 2024-12-20 DIAGNOSIS — C81.72 OTHER CLASSICAL HODGKIN LYMPHOMA OF INTRATHORACIC LYMPH NODES (HCC): ICD-10-CM

## 2024-12-20 RX ORDER — LIDOCAINE/PRILOCAINE 2.5 %-2.5%
CREAM (GRAM) TOPICAL
Qty: 1 G | Refills: 3 | Status: SHIPPED | OUTPATIENT
Start: 2024-12-20

## 2024-12-24 ENCOUNTER — HOSPITAL ENCOUNTER (OUTPATIENT)
Dept: LAB | Facility: MEDICAL CENTER | Age: 24
End: 2024-12-24
Attending: STUDENT IN AN ORGANIZED HEALTH CARE EDUCATION/TRAINING PROGRAM
Payer: COMMERCIAL

## 2024-12-24 DIAGNOSIS — Z79.899 ENCOUNTER FOR LONG-TERM (CURRENT) USE OF HIGH-RISK MEDICATION: ICD-10-CM

## 2024-12-24 DIAGNOSIS — C81.72 OTHER CLASSICAL HODGKIN LYMPHOMA OF INTRATHORACIC LYMPH NODES (HCC): ICD-10-CM

## 2024-12-24 LAB
ALBUMIN SERPL BCP-MCNC: 4 G/DL (ref 3.2–4.9)
ALBUMIN/GLOB SERPL: 1.3 G/DL
ALP SERPL-CCNC: 87 U/L (ref 30–99)
ALT SERPL-CCNC: 13 U/L (ref 2–50)
ANION GAP SERPL CALC-SCNC: 10 MMOL/L (ref 7–16)
AST SERPL-CCNC: 13 U/L (ref 12–45)
BASOPHILS # BLD AUTO: 0.4 % (ref 0–1.8)
BASOPHILS # BLD: 0.03 K/UL (ref 0–0.12)
BILIRUB SERPL-MCNC: <0.2 MG/DL (ref 0.1–1.5)
BUN SERPL-MCNC: 8 MG/DL (ref 8–22)
CALCIUM ALBUM COR SERPL-MCNC: 8.9 MG/DL (ref 8.5–10.5)
CALCIUM SERPL-MCNC: 8.9 MG/DL (ref 8.5–10.5)
CHLORIDE SERPL-SCNC: 106 MMOL/L (ref 96–112)
CO2 SERPL-SCNC: 23 MMOL/L (ref 20–33)
CREAT SERPL-MCNC: 0.53 MG/DL (ref 0.5–1.4)
EOSINOPHIL # BLD AUTO: 0.09 K/UL (ref 0–0.51)
EOSINOPHIL NFR BLD: 1.1 % (ref 0–6.9)
ERYTHROCYTE [DISTWIDTH] IN BLOOD BY AUTOMATED COUNT: 35.6 FL (ref 35.9–50)
GFR SERPLBLD CREATININE-BSD FMLA CKD-EPI: 131 ML/MIN/1.73 M 2
GLOBULIN SER CALC-MCNC: 3.2 G/DL (ref 1.9–3.5)
GLUCOSE SERPL-MCNC: 95 MG/DL (ref 65–99)
HCT VFR BLD AUTO: 39.9 % (ref 37–47)
HGB BLD-MCNC: 12.4 G/DL (ref 12–16)
IMM GRANULOCYTES # BLD AUTO: 0.03 K/UL (ref 0–0.11)
IMM GRANULOCYTES NFR BLD AUTO: 0.4 % (ref 0–0.9)
LDH SERPL L TO P-CCNC: 181 U/L (ref 107–266)
LYMPHOCYTES # BLD AUTO: 1.53 K/UL (ref 1–4.8)
LYMPHOCYTES NFR BLD: 18.2 % (ref 22–41)
MCH RBC QN AUTO: 25.7 PG (ref 27–33)
MCHC RBC AUTO-ENTMCNC: 31.1 G/DL (ref 32.2–35.5)
MCV RBC AUTO: 82.8 FL (ref 81.4–97.8)
MONOCYTES # BLD AUTO: 0.67 K/UL (ref 0–0.85)
MONOCYTES NFR BLD AUTO: 8 % (ref 0–13.4)
NEUTROPHILS # BLD AUTO: 6.06 K/UL (ref 1.82–7.42)
NEUTROPHILS NFR BLD: 71.9 % (ref 44–72)
NRBC # BLD AUTO: 0 K/UL
NRBC BLD-RTO: 0 /100 WBC (ref 0–0.2)
PLATELET # BLD AUTO: 399 K/UL (ref 164–446)
PMV BLD AUTO: 9.4 FL (ref 9–12.9)
POTASSIUM SERPL-SCNC: 4.3 MMOL/L (ref 3.6–5.5)
PROT SERPL-MCNC: 7.2 G/DL (ref 6–8.2)
RBC # BLD AUTO: 4.82 M/UL (ref 4.2–5.4)
SODIUM SERPL-SCNC: 139 MMOL/L (ref 135–145)
URATE SERPL-MCNC: 3.7 MG/DL (ref 1.9–8.2)
WBC # BLD AUTO: 8.4 K/UL (ref 4.8–10.8)

## 2024-12-24 PROCEDURE — 80053 COMPREHEN METABOLIC PANEL: CPT

## 2024-12-24 PROCEDURE — 36415 COLL VENOUS BLD VENIPUNCTURE: CPT

## 2024-12-24 PROCEDURE — 84550 ASSAY OF BLOOD/URIC ACID: CPT

## 2024-12-24 PROCEDURE — 85025 COMPLETE CBC W/AUTO DIFF WBC: CPT

## 2024-12-24 PROCEDURE — 83615 LACTATE (LD) (LDH) ENZYME: CPT

## 2024-12-27 ENCOUNTER — OUTPATIENT INFUSION SERVICES (OUTPATIENT)
Dept: ONCOLOGY | Facility: MEDICAL CENTER | Age: 24
End: 2024-12-27
Attending: STUDENT IN AN ORGANIZED HEALTH CARE EDUCATION/TRAINING PROGRAM
Payer: COMMERCIAL

## 2024-12-27 VITALS
OXYGEN SATURATION: 96 % | DIASTOLIC BLOOD PRESSURE: 70 MMHG | SYSTOLIC BLOOD PRESSURE: 123 MMHG | HEIGHT: 63 IN | HEART RATE: 115 BPM | BODY MASS INDEX: 26.72 KG/M2 | WEIGHT: 150.79 LBS | TEMPERATURE: 98.2 F | RESPIRATION RATE: 18 BRPM

## 2024-12-27 DIAGNOSIS — C81.72 OTHER CLASSICAL HODGKIN LYMPHOMA OF INTRATHORACIC LYMPH NODES (HCC): ICD-10-CM

## 2024-12-27 PROCEDURE — 700102 HCHG RX REV CODE 250 W/ 637 OVERRIDE(OP): Performed by: STUDENT IN AN ORGANIZED HEALTH CARE EDUCATION/TRAINING PROGRAM

## 2024-12-27 PROCEDURE — A9270 NON-COVERED ITEM OR SERVICE: HCPCS | Performed by: STUDENT IN AN ORGANIZED HEALTH CARE EDUCATION/TRAINING PROGRAM

## 2024-12-27 PROCEDURE — 96413 CHEMO IV INFUSION 1 HR: CPT

## 2024-12-27 PROCEDURE — 700111 HCHG RX REV CODE 636 W/ 250 OVERRIDE (IP): Performed by: STUDENT IN AN ORGANIZED HEALTH CARE EDUCATION/TRAINING PROGRAM

## 2024-12-27 PROCEDURE — 96411 CHEMO IV PUSH ADDL DRUG: CPT

## 2024-12-27 PROCEDURE — 96417 CHEMO IV INFUS EACH ADDL SEQ: CPT

## 2024-12-27 PROCEDURE — 96367 TX/PROPH/DG ADDL SEQ IV INF: CPT

## 2024-12-27 PROCEDURE — 700105 HCHG RX REV CODE 258: Performed by: STUDENT IN AN ORGANIZED HEALTH CARE EDUCATION/TRAINING PROGRAM

## 2024-12-27 PROCEDURE — A4212 NON CORING NEEDLE OR STYLET: HCPCS

## 2024-12-27 PROCEDURE — 96375 TX/PRO/DX INJ NEW DRUG ADDON: CPT

## 2024-12-27 RX ORDER — DIPHENHYDRAMINE HYDROCHLORIDE 50 MG/ML
25 INJECTION INTRAMUSCULAR; INTRAVENOUS ONCE
Status: COMPLETED | OUTPATIENT
Start: 2024-12-27 | End: 2024-12-27

## 2024-12-27 RX ORDER — ACETAMINOPHEN 325 MG/1
650 TABLET ORAL ONCE
Status: COMPLETED | OUTPATIENT
Start: 2024-12-27 | End: 2024-12-27

## 2024-12-27 RX ORDER — DEXAMETHASONE SODIUM PHOSPHATE 4 MG/ML
12 INJECTION, SOLUTION INTRA-ARTICULAR; INTRALESIONAL; INTRAMUSCULAR; INTRAVENOUS; SOFT TISSUE ONCE
Status: COMPLETED | OUTPATIENT
Start: 2024-12-27 | End: 2024-12-27

## 2024-12-27 RX ORDER — PALONOSETRON 0.05 MG/ML
0.25 INJECTION, SOLUTION INTRAVENOUS ONCE
Status: COMPLETED | OUTPATIENT
Start: 2024-12-27 | End: 2024-12-27

## 2024-12-27 RX ADMIN — DIPHENHYDRAMINE HYDROCHLORIDE 25 MG: 50 INJECTION, SOLUTION INTRAMUSCULAR; INTRAVENOUS at 07:58

## 2024-12-27 RX ADMIN — SODIUM CHLORIDE 17.5 UNITS: 9 INJECTION, SOLUTION INTRAVENOUS at 09:28

## 2024-12-27 RX ADMIN — DOXORUBICIN HYDROCHLORIDE 42 MG: 2 INJECTION, SOLUTION INTRAVENOUS at 08:47

## 2024-12-27 RX ADMIN — VINBLASTINE SULFATE 10.5 MG: 1 INJECTION INTRAVENOUS at 09:56

## 2024-12-27 RX ADMIN — DACARBAZINE 656.3 MG: 10 INJECTION, POWDER, FOR SOLUTION INTRAVENOUS at 10:26

## 2024-12-27 RX ADMIN — HEPARIN 500 UNITS: 100 SYRINGE at 11:09

## 2024-12-27 RX ADMIN — DEXAMETHASONE SODIUM PHOSPHATE 12 MG: 4 INJECTION INTRA-ARTICULAR; INTRALESIONAL; INTRAMUSCULAR; INTRAVENOUS; SOFT TISSUE at 07:58

## 2024-12-27 RX ADMIN — ACETAMINOPHEN 650 MG: 325 TABLET ORAL at 07:59

## 2024-12-27 RX ADMIN — PALONOSETRON HYDROCHLORIDE 0.25 MG: 0.25 INJECTION INTRAVENOUS at 07:59

## 2024-12-27 RX ADMIN — FOSAPREPITANT 150 MG: 150 INJECTION, POWDER, LYOPHILIZED, FOR SOLUTION INTRAVENOUS at 08:10

## 2024-12-27 ASSESSMENT — FIBROSIS 4 INDEX: FIB4 SCORE: 0.23

## 2024-12-27 NOTE — PROGRESS NOTES
"Pharmacy Chemotherapy Calculation:    Dx: Hodgkin's Lymphoma stage IIB       Protocol: ABVD (DOXOrubicin/Bleomycin/VinBLAStine/Dacarbazine)   *Dosing Reference*  DOXOrubicin 25 mg/m² IV push on Days 1 and 15   Bleomycin 10 units/m² IV over 10 minutes on Days 1 and 15   VinBLAStine 6 mg/m² IV over 5 - 10 minutes on Days 1 and 15   Dacarbazine 375 mg/m² IV over 30 minutes on Days 1 and 15  Primary treatment: 28-day cycle for 2 cycles conditionally followed by additional therapy: Based on Deauville score: 1 - 2 cycles or combined modality     NCCN Guidelines® for Hodgkin Lymphoma V.3.2024.   Demetria A, et al. New Engl J Med. 2010;363(7):640-52.b   Abhijit WASSERMAN, et al. N Engl J Med. 2015;372(17):1598-607.a   Yareli TENORIO et al. J Clin Oncol. 2017;35(16):4067-4428.a  Demi DJ, et al. Blood. 2018;132(10):1013- 1021.a   Eloina M, et al. J Clin Oncol. 2019;37(31):5293-1879.a    Allergies:  Codeine and Other misc       LMP 11/23/2024 (Approximate)  /70   Pulse (!) 115   Temp 36.8 °C (98.2 °F) (Temporal)   Resp 18   Ht 1.61 m (5' 3.39\")   Wt 68.4 kg (150 lb 12.7 oz)   LMP 11/23/2024 (Approximate)   SpO2 96%   BMI 26.39 kg/m²   Body surface area is 1.75 meters squared.    12/17/24 PULMONARY FUNCTION TEST INTERPRETATION    IMPRESSION:    1.  There is no obstruction.  2.  There is no significant bronchodilator response.  3.  RV is elevated to 136% of unclear significance.  4.  DLCO is normal.  5.  Flow volume loop appears normal.    Normal PFTs.    Labs 12/24/24:  ANC~ 6060 Plt = 399k   Hgb = 12.4     SCr = 0.53 mg/dL CrCl >125 mL/min (min SCr 0.7 used)  AST/ALT/AP = 13/13/87 TBili < 0.2    Uric Acid = 3.7  LDH = 181      11/21/24 15:15   Left Ventrical Ejection Fraction 65      Latest Reference Range & Units 12/10/24 08:40   Beta-Hcg Qualitative Serum Negative  Negative     Drug Order   (Drug name, dose, route, IV Fluid & volume, frequency, number of doses) Cycle 1 Day 1  Previous treatment: n/a     Medication = " DOXOrubicin  Base Dose = 25 mg/m2  Calc Dose: Base Dose x 1.75 m2 = 43.75 mg  Final Dose = 42 mg  Route = IV  Fluid & Volume = 21 mL empty bag  Admin Duration = Over 10 minutes          <10% difference, OK to treat with final dose   Medication = Bleomycin  Base Dose = 10 units/m2  Calc Dose: Base Dose x 1.75 m2  = 17.5 units  Final Dose = 17.5 units  Route = IV  Fluid & Volume = NS 25 mL  Admin Duration = Over 10 minutes          <10% difference, OK to treat with final dose   Medication = VinBLAStine  Base Dose = 6 mg/m2  Calc Dose:Base Dose x 1.75 m2 = 10.5 mg  Final Dose = 10.5 mg  Route = IV  Fluid & Volume = NS 25 mL  Admin Duration = Over 10 minutes          <10% difference, OK to treat with final dose   Medication = Dacarbazine  Base Dose = 375 mg/m2  Calc Dose: Base Dose x 1.75 m2 = 656.25 mg  Final Dose = 656.3 mg  Route = IV  Fluid & Volume =  mL  Admin Duration = Over 30 minutes          <10% difference, OK to treat with final dose     By my signature below, I confirm this process was performed independently with the BSA and all final chemotherapy dosing calculations congruent. I have reviewed the above chemotherapy order and that my calculation of the final dose and BSA (when applicable) corroborate those calculations of the  pharmacist. Discrepancies of 10% or greater in the written dose have been addressed and documented within the [facility] Progress notes.    Shayla Marcos PharmD

## 2024-12-27 NOTE — PROGRESS NOTES
Albert presents to infusion for cycle 1/ day 1 of ABVD for hodgkin lymphoma She reports feeling well today, has some anxiety but is overall well.      Education provided to patient on side effects of each medication to be administered as well as neutropenic precautions, chemo precautions to take at home and signs and symptoms to report to MD/seek medical treatment for. Patient verbalized understanding, educational handouts provided to patient on each drug.      Port accessed using sterile technique, flushed with NS with positive blood return.      Labs reviewed by RN, within parameters for treatment today.      Pre-treatment meds given as ordered.      doxorubicin given over 20 minutes.  bleomycin given over 10 minutes.   vinblastine given over 10 minutes.  dacarbazine given over 30 minutes  Patient tolerated all well with no adverse effects.      Port flushed post infusion with positive blood return, heparinized and removed with tip intact, site covered with band aid. Patient left in stable condition, knows when to return for next appointment.

## 2024-12-27 NOTE — PROGRESS NOTES
Chemotherapy Verification - SECONDARY RN       Height = 161 cm  Weight = 68.4 kg  BSA = 1.75 m2       Medication: Doxorubicin  Dose: 25 mg/m2  Calculated Dose: 43.75 m2                             (In mg/m2, AUC, mg/kg)     Medication: Bleomycin  Dose: 10 u/m2  Calculated Dose: 17.5 units                             (In mg/m2, AUC, mg/kg)    Medication: Vinblastine  Dose: 6 mg/m2  Calculated Dose: 10.5 mg                             (In mg/m2, AUC, mg/kg)    Medication: DTIC  Dose: 375 mg/m2  Calculated Dose: 656.25 mg                             (In mg/m2, AUC, mg/kg)      I confirm that this process was performed independently.

## 2024-12-27 NOTE — PROGRESS NOTES
Chemotherapy Verification - PRIMARY RN      Height = 1.61 m  Weight = 68.4 kg  BSA = 1.75 m2       Medication: DOXOrubicin  Dose: 25 mg/m2  Calculated Dose: 43.75 mg                             (In mg/m2, AUC, mg/kg)     Medication: bleomycin  Dose: 10 units/m2  Calculated Dose: 17.5 units                            (In mg/m2, AUC, mg/kg)    Medication: vinBLAStine  Dose: 6 mg/m2  Calculated Dose: 10.5 mg                             (In mg/m2, AUC, mg/kg)    Medication: dacarbazine  Dose: 375 mg/m2  Calculated Dose: 656.25 mg                             (In mg/m2, AUC, mg/kg)    I confirm this process was performed independently with the BSA and all final chemotherapy dosing calculations congruent.  Any discrepancies of 10% or greater have been addressed with the chemotherapy pharmacist. The resolution of the discrepancy has been documented in the EPIC progress notes.

## 2024-12-27 NOTE — PROGRESS NOTES
"Pharmacy Chemotherapy calculation:    DX: Hodgkin Lymphoma    Cycle 1    Day 1  Previous treatment = n/a    Regimen: ABVD    *Dosing Reference*  Doxorubicin 25 mg/m2 IV push on Days 1 and 15  Bleomycin 10 units/m2 IV over 10 minutes on Days 1 and 15  Vinblastine 6 mg/m2 IV over 5-10 minutes on Days 1 and 15  Dacarbazine 375 mg/m2 IV over 30 minutes on Days 1 and 15  Primary treatment: 28-day cycle for 2 cycles conditionally followed by Additional therapy: Based on Deauville score: 2 cycles combinted modality  NCCN Guidelines for Hodgkin Lymphoma V.3.2024.  Demetria AUGUSTE et al. N Engl J Med. 2010;363(7):640-52.  Abhijit J, et al. N Engl J Med. 2015;372(17):1598-607.    Allergies: Codeine and Other misc   /70   Pulse (!) 115   Temp 36.8 °C (98.2 °F) (Temporal)   Resp 18   Ht 1.61 m (5' 3.39\")   Wt 68.4 kg (150 lb 12.7 oz)   LMP 11/23/2024 (Approximate)   SpO2 96%   BMI 26.39 kg/m²   Body surface area is 1.75 meters squared.    Labs 12/24/24  ANC~ 6060 Plt = 399k   Hgb = 12.4     SCr = 0.53 mg/dL CrCl ~ >125 mL/min (minimum SCr of 0.7)   LFT's = WNLs   TBili = <0.2     11/21/24 ECHO LVEF 65%    12/17/24 PULMONARY FUNCTION TEST INTERPRETATION  IMPRESSION:    1.  There is no obstruction.  2.  There is no significant bronchodilator response.  3.  RV is elevated to 136% of unclear significance.  4.  DLCO is normal.  5.  Flow volume loop appears normal.    Doxorubicin 25 mg/m2 x 1.75 m2 = 43.75 mg   <10% difference, okay to treat with final dose = 42 mg IV    Bleomycin 10 units/m2 x 1.75 m2 = 17.5 units   <10% difference, okay to treat with final dose = 17.5 units IV    Vinblastine 6 mg/m2 x 1.75 m2 = 10.5 mg   <10% difference, okay to treat with final dose = 10.5 mg IV    Dacarbazine 375 mg/m2 x 1.75 m2 = 656.25 mg   <10% difference, okay to treat with final dose = 656.3 mg IV    Chirag Bazan, PharmD    "

## 2025-01-01 ENCOUNTER — TELEPHONE (OUTPATIENT)
Dept: HEMATOLOGY ONCOLOGY | Facility: MEDICAL CENTER | Age: 25
End: 2025-01-01
Payer: COMMERCIAL

## 2025-01-01 NOTE — TELEPHONE ENCOUNTER
Nutrition Services: New Referral/ Telephone Encounter   Albert Melaniasobia Gillette is a 24 y.o. female with diagnosis of lymphoma  .     RD attempted to call pt to provide nutrition consultation. Pt did not answer, though RD able to leave brief voice message with contact information for callback.     RD available PRN  [phone]

## 2025-01-03 NOTE — PROGRESS NOTES
"Pharmacy Chemotherapy calculation:    DX: Hodgkin Lymphoma    Cycle 1    Day 15  Previous treatment = C1D1 12/27/24    Regimen: ABVD    *Dosing Reference*  Doxorubicin 25 mg/m2 IV push on Days 1 and 15  Bleomycin 10 units/m2 IV over 10 minutes on Days 1 and 15  Vinblastine 6 mg/m2 IV over 5-10 minutes on Days 1 and 15  Dacarbazine 375 mg/m2 IV over 30 minutes on Days 1 and 15  Primary treatment: 28-day cycle for 2 cycles conditionally followed by Additional therapy: Based on Deauville score: 2 cycles combinted modality  NCCN Guidelines for Hodgkin Lymphoma V.3.2024.  Demetria AUGUSTE et al. N Engl J Med. 2010;363(7):640-52.  Abhijit WASSERMAN, et al. N Engl J Med. 2015;372(17):1598-607.    Allergies: Codeine and Other misc   BP 97/64   Pulse 85   Temp 36.1 °C (96.9 °F) (Temporal)   Resp 16   Ht 1.61 m (5' 3.39\")   Wt 69 kg (152 lb 1.9 oz)   SpO2 97%   BMI 26.62 kg/m²   Body surface area is 1.76 meters squared.    Labs 1/8/25:  ANC~ 1950 Plt = 342k   Hgb = 12.8     SCr = 0.58mg/dL CrCl ~ > 125 mL/min (min SCr 0.7 used)  AST/ALT/AP = WNL TBili = 0.2     ECHO:  11/21/24 ECHO LVEF 65%    12/17/24 PULMONARY FUNCTION TEST INTERPRETATION  IMPRESSION:    1.  There is no obstruction.  2.  There is no significant bronchodilator response.  3.  RV is elevated to 136% of unclear significance.  4.  DLCO is normal.  5.  Flow volume loop appears normal.    Doxorubicin 25 mg/m2 x 1.76 m2 = 44 mg   <10% difference, okay to treat with final dose = 42 mg IV    Bleomycin 10 units/m2 x 1.76 m2 = 17.6 units   <10% difference, okay to treat with final dose = 17.5 units IV    Vinblastine 6 mg/m2 x 1.76 m2 = 10.6 mg   <10% difference, okay to treat with final dose = 10.5 mg IV    Dacarbazine 375 mg/m2 x 1.76 m2 = 660 mg   <10% difference, okay to treat with final dose = 656.3 mg IV    Lisa Martin, PharmD    "

## 2025-01-06 ENCOUNTER — TELEPHONE (OUTPATIENT)
Dept: HEMATOLOGY ONCOLOGY | Facility: MEDICAL CENTER | Age: 25
End: 2025-01-06
Payer: COMMERCIAL

## 2025-01-08 ENCOUNTER — HOSPITAL ENCOUNTER (OUTPATIENT)
Dept: LAB | Facility: MEDICAL CENTER | Age: 25
End: 2025-01-08
Attending: STUDENT IN AN ORGANIZED HEALTH CARE EDUCATION/TRAINING PROGRAM
Payer: COMMERCIAL

## 2025-01-08 DIAGNOSIS — C81.72 OTHER CLASSICAL HODGKIN LYMPHOMA OF INTRATHORACIC LYMPH NODES (HCC): ICD-10-CM

## 2025-01-08 DIAGNOSIS — Z79.899 ENCOUNTER FOR LONG-TERM (CURRENT) USE OF HIGH-RISK MEDICATION: ICD-10-CM

## 2025-01-08 LAB
ALBUMIN SERPL BCP-MCNC: 4.4 G/DL (ref 3.2–4.9)
ALBUMIN/GLOB SERPL: 1.6 G/DL
ALP SERPL-CCNC: 78 U/L (ref 30–99)
ALT SERPL-CCNC: 19 U/L (ref 2–50)
ANION GAP SERPL CALC-SCNC: 6 MMOL/L (ref 7–16)
AST SERPL-CCNC: 16 U/L (ref 12–45)
BASOPHILS # BLD AUTO: 0.8 % (ref 0–1.8)
BASOPHILS # BLD: 0.03 K/UL (ref 0–0.12)
BILIRUB SERPL-MCNC: 0.2 MG/DL (ref 0.1–1.5)
BUN SERPL-MCNC: 11 MG/DL (ref 8–22)
CALCIUM ALBUM COR SERPL-MCNC: 8.9 MG/DL (ref 8.5–10.5)
CALCIUM SERPL-MCNC: 9.2 MG/DL (ref 8.5–10.5)
CHLORIDE SERPL-SCNC: 105 MMOL/L (ref 96–112)
CO2 SERPL-SCNC: 27 MMOL/L (ref 20–33)
CREAT SERPL-MCNC: 0.58 MG/DL (ref 0.5–1.4)
EOSINOPHIL # BLD AUTO: 0.11 K/UL (ref 0–0.51)
EOSINOPHIL NFR BLD: 3.1 % (ref 0–6.9)
ERYTHROCYTE [DISTWIDTH] IN BLOOD BY AUTOMATED COUNT: 38 FL (ref 35.9–50)
GFR SERPLBLD CREATININE-BSD FMLA CKD-EPI: 128 ML/MIN/1.73 M 2
GLOBULIN SER CALC-MCNC: 2.8 G/DL (ref 1.9–3.5)
GLUCOSE SERPL-MCNC: 83 MG/DL (ref 65–99)
HCT VFR BLD AUTO: 40.3 % (ref 37–47)
HGB BLD-MCNC: 12.8 G/DL (ref 12–16)
IMM GRANULOCYTES # BLD AUTO: 0 K/UL (ref 0–0.11)
IMM GRANULOCYTES NFR BLD AUTO: 0 % (ref 0–0.9)
LDH SERPL L TO P-CCNC: 168 U/L (ref 107–266)
LYMPHOCYTES # BLD AUTO: 1.04 K/UL (ref 1–4.8)
LYMPHOCYTES NFR BLD: 29.5 % (ref 22–41)
MCH RBC QN AUTO: 27.1 PG (ref 27–33)
MCHC RBC AUTO-ENTMCNC: 31.8 G/DL (ref 32.2–35.5)
MCV RBC AUTO: 85.2 FL (ref 81.4–97.8)
MONOCYTES # BLD AUTO: 0.4 K/UL (ref 0–0.85)
MONOCYTES NFR BLD AUTO: 11.3 % (ref 0–13.4)
NEUTROPHILS # BLD AUTO: 1.95 K/UL (ref 1.82–7.42)
NEUTROPHILS NFR BLD: 55.3 % (ref 44–72)
NRBC # BLD AUTO: 0 K/UL
NRBC BLD-RTO: 0 /100 WBC (ref 0–0.2)
PLATELET # BLD AUTO: 342 K/UL (ref 164–446)
PMV BLD AUTO: 9.3 FL (ref 9–12.9)
POTASSIUM SERPL-SCNC: 4.8 MMOL/L (ref 3.6–5.5)
PROT SERPL-MCNC: 7.2 G/DL (ref 6–8.2)
RBC # BLD AUTO: 4.73 M/UL (ref 4.2–5.4)
SODIUM SERPL-SCNC: 138 MMOL/L (ref 135–145)
URATE SERPL-MCNC: 3.9 MG/DL (ref 1.9–8.2)
WBC # BLD AUTO: 3.5 K/UL (ref 4.8–10.8)

## 2025-01-08 PROCEDURE — 84550 ASSAY OF BLOOD/URIC ACID: CPT

## 2025-01-08 PROCEDURE — 85025 COMPLETE CBC W/AUTO DIFF WBC: CPT

## 2025-01-08 PROCEDURE — 80053 COMPREHEN METABOLIC PANEL: CPT

## 2025-01-08 PROCEDURE — 83615 LACTATE (LD) (LDH) ENZYME: CPT

## 2025-01-08 PROCEDURE — 36415 COLL VENOUS BLD VENIPUNCTURE: CPT

## 2025-01-10 ENCOUNTER — OUTPATIENT INFUSION SERVICES (OUTPATIENT)
Dept: ONCOLOGY | Facility: MEDICAL CENTER | Age: 25
End: 2025-01-10
Attending: STUDENT IN AN ORGANIZED HEALTH CARE EDUCATION/TRAINING PROGRAM
Payer: MEDICAID

## 2025-01-10 VITALS
SYSTOLIC BLOOD PRESSURE: 97 MMHG | RESPIRATION RATE: 16 BRPM | WEIGHT: 152.12 LBS | DIASTOLIC BLOOD PRESSURE: 64 MMHG | HEART RATE: 85 BPM | HEIGHT: 63 IN | TEMPERATURE: 96.9 F | OXYGEN SATURATION: 97 % | BODY MASS INDEX: 26.95 KG/M2

## 2025-01-10 DIAGNOSIS — C81.72 OTHER CLASSICAL HODGKIN LYMPHOMA OF INTRATHORACIC LYMPH NODES (HCC): ICD-10-CM

## 2025-01-10 PROCEDURE — 96367 TX/PROPH/DG ADDL SEQ IV INF: CPT

## 2025-01-10 PROCEDURE — 700111 HCHG RX REV CODE 636 W/ 250 OVERRIDE (IP): Mod: JZ,UD | Performed by: STUDENT IN AN ORGANIZED HEALTH CARE EDUCATION/TRAINING PROGRAM

## 2025-01-10 PROCEDURE — 96375 TX/PRO/DX INJ NEW DRUG ADDON: CPT

## 2025-01-10 PROCEDURE — 700105 HCHG RX REV CODE 258: Mod: UD | Performed by: STUDENT IN AN ORGANIZED HEALTH CARE EDUCATION/TRAINING PROGRAM

## 2025-01-10 PROCEDURE — 700102 HCHG RX REV CODE 250 W/ 637 OVERRIDE(OP): Mod: UD | Performed by: STUDENT IN AN ORGANIZED HEALTH CARE EDUCATION/TRAINING PROGRAM

## 2025-01-10 PROCEDURE — 96413 CHEMO IV INFUSION 1 HR: CPT

## 2025-01-10 PROCEDURE — 96411 CHEMO IV PUSH ADDL DRUG: CPT

## 2025-01-10 PROCEDURE — A9270 NON-COVERED ITEM OR SERVICE: HCPCS | Mod: UD | Performed by: STUDENT IN AN ORGANIZED HEALTH CARE EDUCATION/TRAINING PROGRAM

## 2025-01-10 PROCEDURE — 96417 CHEMO IV INFUS EACH ADDL SEQ: CPT

## 2025-01-10 PROCEDURE — A4212 NON CORING NEEDLE OR STYLET: HCPCS

## 2025-01-10 RX ORDER — DEXAMETHASONE SODIUM PHOSPHATE 4 MG/ML
12 INJECTION, SOLUTION INTRA-ARTICULAR; INTRALESIONAL; INTRAMUSCULAR; INTRAVENOUS; SOFT TISSUE ONCE
Status: COMPLETED | OUTPATIENT
Start: 2025-01-10 | End: 2025-01-10

## 2025-01-10 RX ORDER — PALONOSETRON 0.05 MG/ML
0.25 INJECTION, SOLUTION INTRAVENOUS ONCE
Status: COMPLETED | OUTPATIENT
Start: 2025-01-10 | End: 2025-01-10

## 2025-01-10 RX ORDER — ACETAMINOPHEN 325 MG/1
650 TABLET ORAL ONCE
Status: COMPLETED | OUTPATIENT
Start: 2025-01-10 | End: 2025-01-10

## 2025-01-10 RX ORDER — DIPHENHYDRAMINE HYDROCHLORIDE 50 MG/ML
25 INJECTION INTRAMUSCULAR; INTRAVENOUS ONCE
Status: COMPLETED | OUTPATIENT
Start: 2025-01-10 | End: 2025-01-10

## 2025-01-10 RX ADMIN — DOXORUBICIN HYDROCHLORIDE 42 MG: 2 INJECTION, SOLUTION INTRAVENOUS at 08:26

## 2025-01-10 RX ADMIN — BLEOMYCIN SULFATE 17.5 UNITS: 15 POWDER, FOR SOLUTION INTRAMUSCULAR; INTRAPLEURAL; INTRAVENOUS; SUBCUTANEOUS at 09:06

## 2025-01-10 RX ADMIN — DEXAMETHASONE SODIUM PHOSPHATE 12 MG: 4 INJECTION INTRA-ARTICULAR; INTRALESIONAL; INTRAMUSCULAR; INTRAVENOUS; SOFT TISSUE at 07:32

## 2025-01-10 RX ADMIN — ACETAMINOPHEN 650 MG: 325 TABLET ORAL at 07:29

## 2025-01-10 RX ADMIN — DACARBAZINE 656.3 MG: 10 INJECTION, POWDER, FOR SOLUTION INTRAVENOUS at 09:39

## 2025-01-10 RX ADMIN — PALONOSETRON HYDROCHLORIDE 0.25 MG: 0.25 INJECTION INTRAVENOUS at 07:35

## 2025-01-10 RX ADMIN — DIPHENHYDRAMINE HYDROCHLORIDE 25 MG: 50 INJECTION, SOLUTION INTRAMUSCULAR; INTRAVENOUS at 07:30

## 2025-01-10 RX ADMIN — VINBLASTINE SULFATE 10.5 MG: 1 INJECTION INTRAVENOUS at 09:25

## 2025-01-10 RX ADMIN — FOSAPREPITANT 150 MG: 150 INJECTION, POWDER, LYOPHILIZED, FOR SOLUTION INTRAVENOUS at 07:43

## 2025-01-10 RX ADMIN — HEPARIN 500 UNITS: 100 SYRINGE at 10:21

## 2025-01-10 ASSESSMENT — FIBROSIS 4 INDEX: FIB4 SCORE: 0.27

## 2025-01-10 NOTE — PROGRESS NOTES
Chemotherapy Verification - SECONDARY RN       Height = 1.61m  Weight = kg  BSA = 1.76m2       Medication: DOXOrubicin (Adriamycin)  Dose: 25mg/m2  Calculated Dose: 44mg                             (In mg/m2, AUC, mg/kg)     Medication: bleomycin (Blenoxane)  Dose: 10units/m2  Calculated Dose: 17.6mg                             (In mg/m2, AUC, mg/kg)    Medication: vinBLAStine (Velban)  Dose: 6mg/m2  Calculated Dose: 10.56mg                             (In mg/m2, AUC, mg/kg)    Medication: dacarbazine (Dtic)  Dose: 375mg/m2  Calculated Dose: 660mg                             (In mg/m2, AUC, mg/kg)        I confirm that this process was performed independently.

## 2025-01-10 NOTE — PROGRESS NOTES
Pt arrived to IS, ambulatory, for C1D15 ABVD. Pt voices no new complaints. Port accessed in sterile manner, positive blood return noted. Labs reviewed from 1/8, pt within parameters to treat today. Pre-medications administered with no complications. Doxorubicin, bleomycin, vinblastine, and dacarbazine infused with no s/sx of adverse reaction. Port flushed and heparin locked per policy, port de-accessed. Pt left IS with no s/sx of distress. Follow up appointment confirmed.

## 2025-01-10 NOTE — PROGRESS NOTES
"Pharmacy Chemotherapy Calculation:    Dx: Hodgkin's Lymphoma stage IIB       Protocol: ABVD (DOXOrubicin/Bleomycin/VinBLAStine/Dacarbazine)   *Dosing Reference*  DOXOrubicin 25 mg/m² IV push on Days 1 and 15   Bleomycin 10 units/m² IV over 10 minutes on Days 1 and 15   VinBLAStine 6 mg/m² IV over 5 - 10 minutes on Days 1 and 15   Dacarbazine 375 mg/m² IV over 30 minutes on Days 1 and 15  Primary treatment: 28-day cycle for 2 cycles conditionally followed by additional therapy: Based on Deauville score: 1 - 2 cycles or combined modality     NCCN Guidelines® for Hodgkin Lymphoma V.3.2024.   Demetria A, et al. New Engl J Med. 2010;363(7):640-52.b   Abhijit WASSERMAN, et al. N Engl J Med. 2015;372(17):1598-607.a   Yareli TENORIO et al. J Clin Oncol. 2017;35(16):6071-6503.a  Demi DJ, et al. Blood. 2018;132(10):1013- 1021.a   Eloina M, et al. J Clin Oncol. 2019;37(31):8321-6210.a    Allergies:  Codeine and Other misc     BP 97/64   Pulse 85   Temp 36.1 °C (96.9 °F) (Temporal)   Resp 16   Ht 1.61 m (5' 3.39\")   Wt 69 kg (152 lb 1.9 oz)   SpO2 97%   BMI 26.62 kg/m²   Body surface area is 1.76 meters squared.    12/17/24 PULMONARY FUNCTION TEST INTERPRETATION  IMPRESSION:    1.  There is no obstruction.  2.  There is no significant bronchodilator response.  3.  RV is elevated to 136% of unclear significance.  4.  DLCO is normal.  5.  Flow volume loop appears normal.  Normal PFTs.    Labs 1/8/25  ANC~ 1950 Plt = 342k   Hgb = 12.8     SCr = 0.58 mg/dL CrCl ~ >125 mL/min (minimum SCr of 0.7)   LFT's = WNLs  TBili = 0.2       11/21/24 15:15   Left Ventrical Ejection Fraction 65     Drug Order   (Drug name, dose, route, IV Fluid & volume, frequency, number of doses) Cycle 1 Day 15  Previous treatment: C1D1 on 12/27/24     Medication = DOXOrubicin  Base Dose = 25 mg/m2  Calc Dose: Base Dose x 1.76 m2 = 44 mg  Final Dose = 42 mg  Route = IV  Fluid & Volume = 21 mL empty bag  Admin Duration = Over 20 minutes          <10% difference, OK " to treat with final dose   Medication = Bleomycin  Base Dose = 10 units/m2  Calc Dose: Base Dose x 1.76 m2  = 17.6 units  Final Dose = 17.5 units  Route = IV  Fluid & Volume = NS 25 mL  Admin Duration = Over 10 minutes          <10% difference, OK to treat with final dose   Medication = VinBLAStine  Base Dose = 6 mg/m2  Calc Dose:Base Dose x 1.76 m2 = 10.56 mg  Final Dose = 10.5 mg  Route = IV  Fluid & Volume = NS 25 mL  Admin Duration = Over 5-10 minutes          <10% difference, OK to treat with final dose   Medication = Dacarbazine  Base Dose = 375 mg/m2  Calc Dose: Base Dose x 1.76 m2 = 660 mg  Final Dose = 656.3 mg  Route = IV  Fluid & Volume =  mL  Admin Duration = Over 30 minutes          <10% difference, OK to treat with final dose   By my signature below, I confirm this process was performed independently with the BSA and all final chemotherapy dosing calculations congruent. I have reviewed the above chemotherapy order and that my calculation of the final dose and BSA (when applicable) corroborate those calculations of the  pharmacist. Discrepancies of 10% or greater in the written dose have been addressed and documented within the EPIC Progress notes.    Chirag Bazan, LeónD

## 2025-01-10 NOTE — PROGRESS NOTES
Chemotherapy Verification - PRIMARY RN      Height = 161 cm  Weight = 69 kg  BSA = 1.76 m2       Medication: Doxorubicin  Dose: 25 mg/m2  Calculated Dose: 44 mg                             (In mg/m2, AUC, mg/kg)     Medication: Bleomycin  Dose: 10 u/m2  Calculated Dose: 17.6 units                             (In mg/m2, AUC, mg/kg)    Medication: Vinblastine  Dose: 6 mg/m2  Calculated Dose: 10.56 mg                             (In mg/m2, AUC, mg/kg)    Medication: DTIC  Dose: 375 mg/m2  Calculated Dose: 660 mg                             (In mg/m2, AUC, mg/kg)      I confirm this process was performed independently with the BSA and all final chemotherapy dosing calculations congruent.  Any discrepancies of 10% or greater have been addressed with the chemotherapy pharmacist. The resolution of the discrepancy has been documented in the EPIC progress notes.

## 2025-01-11 RX ORDER — METHYLPREDNISOLONE SODIUM SUCCINATE 125 MG/2ML
125 INJECTION, POWDER, LYOPHILIZED, FOR SOLUTION INTRAMUSCULAR; INTRAVENOUS PRN
Status: CANCELLED | OUTPATIENT
Start: 2025-01-25

## 2025-01-11 RX ORDER — 0.9 % SODIUM CHLORIDE 0.9 %
3 VIAL (ML) INJECTION PRN
Status: CANCELLED | OUTPATIENT
Start: 2025-01-24

## 2025-01-11 RX ORDER — ACETAMINOPHEN 325 MG/1
650 TABLET ORAL ONCE
Status: CANCELLED | OUTPATIENT
Start: 2025-01-25 | End: 2025-01-13

## 2025-01-11 RX ORDER — DIPHENHYDRAMINE HYDROCHLORIDE 50 MG/ML
50 INJECTION INTRAMUSCULAR; INTRAVENOUS PRN
OUTPATIENT
Start: 2025-02-08

## 2025-01-11 RX ORDER — ACETAMINOPHEN 325 MG/1
650 TABLET ORAL ONCE
OUTPATIENT
Start: 2025-02-08 | End: 2025-01-27

## 2025-01-11 RX ORDER — METHYLPREDNISOLONE SODIUM SUCCINATE 125 MG/2ML
125 INJECTION, POWDER, LYOPHILIZED, FOR SOLUTION INTRAMUSCULAR; INTRAVENOUS PRN
OUTPATIENT
Start: 2025-02-08

## 2025-01-11 RX ORDER — EPINEPHRINE 1 MG/ML(1)
0.5 AMPUL (ML) INJECTION PRN
OUTPATIENT
Start: 2025-02-08

## 2025-01-11 RX ORDER — 0.9 % SODIUM CHLORIDE 0.9 %
10 VIAL (ML) INJECTION PRN
Status: CANCELLED | OUTPATIENT
Start: 2025-01-24

## 2025-01-11 RX ORDER — DEXAMETHASONE SODIUM PHOSPHATE 4 MG/ML
12 INJECTION, SOLUTION INTRA-ARTICULAR; INTRALESIONAL; INTRAMUSCULAR; INTRAVENOUS; SOFT TISSUE ONCE
Status: CANCELLED | OUTPATIENT
Start: 2025-01-25 | End: 2025-01-13

## 2025-01-11 RX ORDER — PROCHLORPERAZINE MALEATE 10 MG
10 TABLET ORAL EVERY 6 HOURS PRN
Status: CANCELLED | OUTPATIENT
Start: 2025-01-25

## 2025-01-11 RX ORDER — 0.9 % SODIUM CHLORIDE 0.9 %
10 VIAL (ML) INJECTION PRN
Status: CANCELLED | OUTPATIENT
Start: 2025-01-25

## 2025-01-11 RX ORDER — DIPHENHYDRAMINE HYDROCHLORIDE 50 MG/ML
25 INJECTION INTRAMUSCULAR; INTRAVENOUS ONCE
OUTPATIENT
Start: 2025-02-08 | End: 2025-01-27

## 2025-01-11 RX ORDER — SODIUM CHLORIDE 9 MG/ML
INJECTION, SOLUTION INTRAVENOUS CONTINUOUS
OUTPATIENT
Start: 2025-02-08

## 2025-01-11 RX ORDER — 0.9 % SODIUM CHLORIDE 0.9 %
VIAL (ML) INJECTION PRN
Status: CANCELLED | OUTPATIENT
Start: 2025-01-25

## 2025-01-11 RX ORDER — DIPHENHYDRAMINE HYDROCHLORIDE 50 MG/ML
25 INJECTION INTRAMUSCULAR; INTRAVENOUS ONCE
Status: CANCELLED | OUTPATIENT
Start: 2025-01-25 | End: 2025-01-13

## 2025-01-11 RX ORDER — ONDANSETRON 8 MG/1
8 TABLET, ORALLY DISINTEGRATING ORAL PRN
OUTPATIENT
Start: 2025-02-08

## 2025-01-11 RX ORDER — 0.9 % SODIUM CHLORIDE 0.9 %
10 VIAL (ML) INJECTION PRN
OUTPATIENT
Start: 2025-02-08

## 2025-01-11 RX ORDER — ONDANSETRON 2 MG/ML
4 INJECTION INTRAMUSCULAR; INTRAVENOUS PRN
Status: CANCELLED | OUTPATIENT
Start: 2025-01-25

## 2025-01-11 RX ORDER — PROCHLORPERAZINE MALEATE 10 MG
10 TABLET ORAL EVERY 6 HOURS PRN
OUTPATIENT
Start: 2025-02-08

## 2025-01-11 RX ORDER — ONDANSETRON 2 MG/ML
4 INJECTION INTRAMUSCULAR; INTRAVENOUS PRN
OUTPATIENT
Start: 2025-02-08

## 2025-01-11 RX ORDER — 0.9 % SODIUM CHLORIDE 0.9 %
3 VIAL (ML) INJECTION PRN
Status: CANCELLED | OUTPATIENT
Start: 2025-01-25

## 2025-01-11 RX ORDER — 0.9 % SODIUM CHLORIDE 0.9 %
3 VIAL (ML) INJECTION PRN
OUTPATIENT
Start: 2025-02-08

## 2025-01-11 RX ORDER — 0.9 % SODIUM CHLORIDE 0.9 %
VIAL (ML) INJECTION PRN
OUTPATIENT
Start: 2025-02-08

## 2025-01-11 RX ORDER — DIPHENHYDRAMINE HYDROCHLORIDE 50 MG/ML
50 INJECTION INTRAMUSCULAR; INTRAVENOUS PRN
Status: CANCELLED | OUTPATIENT
Start: 2025-01-25

## 2025-01-11 RX ORDER — DEXAMETHASONE SODIUM PHOSPHATE 4 MG/ML
12 INJECTION, SOLUTION INTRA-ARTICULAR; INTRALESIONAL; INTRAMUSCULAR; INTRAVENOUS; SOFT TISSUE ONCE
OUTPATIENT
Start: 2025-02-08 | End: 2025-01-27

## 2025-01-11 RX ORDER — SODIUM CHLORIDE 9 MG/ML
INJECTION, SOLUTION INTRAVENOUS CONTINUOUS
Status: CANCELLED | OUTPATIENT
Start: 2025-01-25

## 2025-01-11 RX ORDER — PALONOSETRON 0.05 MG/ML
0.25 INJECTION, SOLUTION INTRAVENOUS ONCE
Status: CANCELLED | OUTPATIENT
Start: 2025-01-25 | End: 2025-01-13

## 2025-01-11 RX ORDER — PALONOSETRON 0.05 MG/ML
0.25 INJECTION, SOLUTION INTRAVENOUS ONCE
OUTPATIENT
Start: 2025-02-08 | End: 2025-01-27

## 2025-01-11 RX ORDER — 0.9 % SODIUM CHLORIDE 0.9 %
VIAL (ML) INJECTION PRN
Status: CANCELLED | OUTPATIENT
Start: 2025-01-24

## 2025-01-11 RX ORDER — ONDANSETRON 8 MG/1
8 TABLET, ORALLY DISINTEGRATING ORAL PRN
Status: CANCELLED | OUTPATIENT
Start: 2025-01-25

## 2025-01-11 RX ORDER — EPINEPHRINE 1 MG/ML(1)
0.5 AMPUL (ML) INJECTION PRN
Status: CANCELLED | OUTPATIENT
Start: 2025-01-25

## 2025-01-13 ENCOUNTER — PATIENT OUTREACH (OUTPATIENT)
Dept: ONCOLOGY | Facility: MEDICAL CENTER | Age: 25
End: 2025-01-13
Payer: COMMERCIAL

## 2025-01-13 ENCOUNTER — PATIENT MESSAGE (OUTPATIENT)
Dept: ONCOLOGY | Facility: MEDICAL CENTER | Age: 25
End: 2025-01-13

## 2025-01-13 NOTE — PROGRESS NOTES
ONCOLOGY NURSE NAVIGATOR (ONN) ASSESSMENT:  Nurse Navigator Hazel Márquez reached out to patient to follow up on provider referral.  There was no answer and answering service was not set up, no message left.  NN reviewed chart.  Mother, Daily listed as contact with approval to discuss treatment.  NN called Daily, introduced myself and explained my role at [facility]. Patient is established with Dr. Silver, receiving ABVD chemo in [facility].  Daily shared her daughter is a nursing student and certain days has clinicals so doesn't have her phone on.  She shared that Friday was Albert's 2nd day of treatment.  NN reviewed local and national resources.  Plan to send my letter, [facility] calendar [facility] information and LLS information to patients MyChart.  Daily agreed with the plan.  Current questions have been answered and I encouraged a call back for any future questions or concerns.  N     Patient's support team is her Mom, Daily and her S/O John.  Both are approved to discuss treatment and billing.  NN unable to speak with patient today.  Barriers to care and DST will be discussed at next encounter.      DX:  classic Hodgkin's Lymphoma, lymph nodes of neck.    POC:  Patient established with Dr. Silver, chemo cycles in [facility].    BARRIERS ASSESSMENT:  Barriers and DST assessments no done, unable to speak with patient.    INTERVENTIONS:  NN letter, [facility] calendar, [facility] information, and [employer] information sent to patient's MyChart.

## 2025-01-22 ENCOUNTER — HOSPITAL ENCOUNTER (OUTPATIENT)
Dept: HEMATOLOGY ONCOLOGY | Facility: MEDICAL CENTER | Age: 25
End: 2025-01-22
Attending: PHYSICIAN ASSISTANT
Payer: COMMERCIAL

## 2025-01-22 ENCOUNTER — HOSPITAL ENCOUNTER (OUTPATIENT)
Dept: LAB | Facility: MEDICAL CENTER | Age: 25
End: 2025-01-22
Attending: STUDENT IN AN ORGANIZED HEALTH CARE EDUCATION/TRAINING PROGRAM
Payer: COMMERCIAL

## 2025-01-22 VITALS
HEIGHT: 63 IN | OXYGEN SATURATION: 98 % | SYSTOLIC BLOOD PRESSURE: 115 MMHG | WEIGHT: 149.91 LBS | RESPIRATION RATE: 15 BRPM | DIASTOLIC BLOOD PRESSURE: 60 MMHG | TEMPERATURE: 96.8 F | HEART RATE: 100 BPM | BODY MASS INDEX: 26.56 KG/M2

## 2025-01-22 DIAGNOSIS — Z79.899 ENCOUNTER FOR LONG-TERM (CURRENT) USE OF HIGH-RISK MEDICATION: ICD-10-CM

## 2025-01-22 DIAGNOSIS — C81.72 OTHER CLASSICAL HODGKIN LYMPHOMA OF INTRATHORACIC LYMPH NODES (HCC): ICD-10-CM

## 2025-01-22 LAB
ALBUMIN SERPL BCP-MCNC: 4.2 G/DL (ref 3.2–4.9)
ALBUMIN/GLOB SERPL: 1.6 G/DL
ALP SERPL-CCNC: 73 U/L (ref 30–99)
ALT SERPL-CCNC: 37 U/L (ref 2–50)
ANION GAP SERPL CALC-SCNC: 8 MMOL/L (ref 7–16)
AST SERPL-CCNC: 24 U/L (ref 12–45)
BASOPHILS # BLD AUTO: 0.8 % (ref 0–1.8)
BASOPHILS # BLD: 0.02 K/UL (ref 0–0.12)
BILIRUB SERPL-MCNC: 0.3 MG/DL (ref 0.1–1.5)
BUN SERPL-MCNC: 8 MG/DL (ref 8–22)
CALCIUM ALBUM COR SERPL-MCNC: 8.9 MG/DL (ref 8.5–10.5)
CALCIUM SERPL-MCNC: 9.1 MG/DL (ref 8.5–10.5)
CHLORIDE SERPL-SCNC: 106 MMOL/L (ref 96–112)
CO2 SERPL-SCNC: 25 MMOL/L (ref 20–33)
CREAT SERPL-MCNC: 0.66 MG/DL (ref 0.5–1.4)
EOSINOPHIL # BLD AUTO: 0.08 K/UL (ref 0–0.51)
EOSINOPHIL NFR BLD: 3.3 % (ref 0–6.9)
ERYTHROCYTE [DISTWIDTH] IN BLOOD BY AUTOMATED COUNT: 44.1 FL (ref 35.9–50)
GFR SERPLBLD CREATININE-BSD FMLA CKD-EPI: 125 ML/MIN/1.73 M 2
GLOBULIN SER CALC-MCNC: 2.6 G/DL (ref 1.9–3.5)
GLUCOSE SERPL-MCNC: 84 MG/DL (ref 65–99)
HCT VFR BLD AUTO: 40.5 % (ref 37–47)
HGB BLD-MCNC: 13 G/DL (ref 12–16)
IMM GRANULOCYTES # BLD AUTO: 0.01 K/UL (ref 0–0.11)
IMM GRANULOCYTES NFR BLD AUTO: 0.4 % (ref 0–0.9)
LYMPHOCYTES # BLD AUTO: 0.86 K/UL (ref 1–4.8)
LYMPHOCYTES NFR BLD: 35.8 % (ref 22–41)
MCH RBC QN AUTO: 27.4 PG (ref 27–33)
MCHC RBC AUTO-ENTMCNC: 32.1 G/DL (ref 32.2–35.5)
MCV RBC AUTO: 85.3 FL (ref 81.4–97.8)
MONOCYTES # BLD AUTO: 0.22 K/UL (ref 0–0.85)
MONOCYTES NFR BLD AUTO: 9.2 % (ref 0–13.4)
NEUTROPHILS # BLD AUTO: 1.21 K/UL (ref 1.82–7.42)
NEUTROPHILS NFR BLD: 50.5 % (ref 44–72)
NRBC # BLD AUTO: 0 K/UL
NRBC BLD-RTO: 0 /100 WBC (ref 0–0.2)
PLATELET # BLD AUTO: 277 K/UL (ref 164–446)
PMV BLD AUTO: 9.9 FL (ref 9–12.9)
POTASSIUM SERPL-SCNC: 4.2 MMOL/L (ref 3.6–5.5)
PROT SERPL-MCNC: 6.8 G/DL (ref 6–8.2)
RBC # BLD AUTO: 4.75 M/UL (ref 4.2–5.4)
SODIUM SERPL-SCNC: 139 MMOL/L (ref 135–145)
WBC # BLD AUTO: 2.4 K/UL (ref 4.8–10.8)

## 2025-01-22 PROCEDURE — 99212 OFFICE O/P EST SF 10 MIN: CPT | Performed by: PHYSICIAN ASSISTANT

## 2025-01-22 PROCEDURE — 80053 COMPREHEN METABOLIC PANEL: CPT

## 2025-01-22 PROCEDURE — 99214 OFFICE O/P EST MOD 30 MIN: CPT | Performed by: PHYSICIAN ASSISTANT

## 2025-01-22 PROCEDURE — 36415 COLL VENOUS BLD VENIPUNCTURE: CPT

## 2025-01-22 PROCEDURE — 85025 COMPLETE CBC W/AUTO DIFF WBC: CPT

## 2025-01-22 ASSESSMENT — ENCOUNTER SYMPTOMS
CONSTIPATION: 0
CHILLS: 0
DOUBLE VISION: 0
TINGLING: 0
FEVER: 0
DIARRHEA: 0
ABDOMINAL PAIN: 0
VOMITING: 0
WEIGHT LOSS: 0
SPUTUM PRODUCTION: 0
MYALGIAS: 0
HEARTBURN: 0
INSOMNIA: 0
NAUSEA: 1
BLURRED VISION: 0
PALPITATIONS: 0
WHEEZING: 0
DIZZINESS: 1
DIAPHORESIS: 1
HEADACHES: 0
BLOOD IN STOOL: 0
COUGH: 0
BRUISES/BLEEDS EASILY: 0
SHORTNESS OF BREATH: 0

## 2025-01-22 ASSESSMENT — FIBROSIS 4 INDEX: FIB4 SCORE: 0.27

## 2025-01-22 NOTE — PROGRESS NOTES
Follow Up Note:  Hematology/Oncology    Current Diagnosis and Staging: classical hodgkin's lymphoma, stage IIB  Date of Diagnosis: Nov 2024    Chief Complaint: Patient seen today for evaluation prior to cycle 2 ABVD for continued monitoring of symptoms and side effects of cancer treatments.     Care Team:   Margie Rhodes P.A.-C.  Dr Nadeem Navarro    Oncology History of Presenting Illness: Albert Gillette is a 24 y.o. [place] woman who presents to the clinic for evaluation for systemic therapy for a new diagnosis of classical Hodgkin's lymphoma. She noticed a lump in her neck at the beginning of August and ultimately was referred to our intake oncology coordinator after US showed lymphadenopathy in the left neck. She had it monitored and they did not change in size at all, but did protrude with certain movements. She also reports having significant night sweats and fatigue over the past several months. She was sent for a CT scan of the soft tissue/neck which revealed more nodes in the anterior mediastinum. She ultimately went through a core biopsy in Nov 2024 which revealed classical Hodgkin's lymphoma. She underwent a PET scan which revealed stage IIA disease. ECHO on 11/21/24, Port placed on 12/10/24, PFTs on 12/17/24. She initiated ABVD on 12/27/24.     Current Treatment: ABVD    Treatment History:   12/27/24: C1 D1 ABVD  01/10/25: C1 D15  01/25/25: C2 D1 ABVD *delayed 1 day due to test at school(planned)    Interval History Louis GUZMAN:  Albert returns to clinic after completing C1 of ABVD. She noted some recurrence of right carpal tunnel symptoms (mild), nausea (mild, well controlled on zofran only), orange discoloration of urine and light bruising around her port site during Tx & for ~3 d afterwards. She does note tinnitius bilaterally lasting a few sec, occurs every 3rd day or so since starting treatment. Otherwise she is feeling back to normal with a good appetite and energy levels, the  left supraclavicular node is shrinking. 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     Allergies as of 01/22/2025 - Reviewed 01/10/2025   Allergen Reaction Noted    Codeine  07/24/2024    Other misc  12/06/2024       Current Outpatient Medications:     lidocaine-prilocaine (EMLA) 2.5-2.5 % Cream, Apply to port 1 hour prior to access of port and cover with plastic wrap., Disp: 1 g, Rfl: 3    ondansetron (ZOFRAN) 4 MG Tab tablet, Take 1 Tablet by mouth every four hours as needed for Nausea/Vomiting (for nausea, vomiting)., Disp: 30 Tablet, Rfl: 6    prochlorperazine (COMPAZINE) 10 MG Tab, Take 1 Tablet by mouth every 6 hours as needed (for nausea, vomiting)., Disp: 30 Tablet, Rfl: 6    OLANZapine (ZYPREXA) 5 MG Tab, Take 1 Tablet by mouth every evening., Disp: 30 Tablet, Rfl: 6    Levonorgestrel (MIRENA, 52 MG, IU), by Intrauterine route. MG unknown, Disp: , Rfl:     Review of Systems:  Review of Systems   Constitutional:  Positive for diaphoresis (infrequent night sweats, improved) and malaise/fatigue (xfew days after tx). Negative for chills, fever and weight loss.   HENT:  Positive for tinnitus (bilateral tinnitus xfew sec, once every ~3d). Negative for hearing loss.    Eyes:  Negative for blurred vision and double vision.   Respiratory:  Negative for cough, sputum production, shortness of breath and wheezing.    Cardiovascular:  Negative for chest pain, palpitations and leg swelling.   Gastrointestinal:  Positive for nausea. Negative for abdominal pain, blood in stool, constipation, diarrhea, heartburn, melena and vomiting.   Genitourinary:  Negative for dysuria and hematuria.   Musculoskeletal:  Negative for joint pain and myalgias.   Skin:  Negative for rash.   Neurological:  Positive for dizziness (some orthostatsic hypotension x1wk around tx). Negative for tingling and headaches.   Endo/Heme/Allergies:  Does not bruise/bleed easily.   Psychiatric/Behavioral:  The patient does not have insomnia.         Physical Exam:  There were no vitals filed for this visit.    Physical Exam  Constitutional:       Appearance: Normal appearance.   Cardiovascular:      Rate and Rhythm: Normal rate and regular rhythm.      Heart sounds: Normal heart sounds.   Pulmonary:      Effort: Pulmonary effort is normal.      Breath sounds: Normal breath sounds.   Abdominal:      General: Abdomen is flat. Bowel sounds are normal.      Palpations: Abdomen is soft.   Lymphadenopathy:      Upper Body:      Left upper body: Supraclavicular adenopathy present.      Comments: Left supraclavicular node palpable but smaller than previous, remaining lymph node exam negative   Neurological:      Mental Status: She is alert and oriented to person, place, and time.   Psychiatric:         Behavior: Behavior normal.         Labs:    Latest Reference Range & Units 01/08/25 13:37   WBC 4.8 - 10.8 K/uL 3.5 (L)   RBC 4.20 - 5.40 M/uL 4.73   Hemoglobin 12.0 - 16.0 g/dL 12.8   Hematocrit 37.0 - 47.0 % 40.3   MCV 81.4 - 97.8 fL 85.2   MCH 27.0 - 33.0 pg 27.1   MCHC 32.2 - 35.5 g/dL 31.8 (L)   RDW 35.9 - 50.0 fL 38.0   Platelet Count 164 - 446 K/uL 342   MPV 9.0 - 12.9 fL 9.3   Neutrophils-Polys 44.00 - 72.00 % 55.30   Neutrophils (Absolute) 1.82 - 7.42 K/uL 1.95   Lymphocytes 22.00 - 41.00 % 29.50   Lymphs (Absolute) 1.00 - 4.80 K/uL 1.04   Monocytes 0.00 - 13.40 % 11.30   Monos (Absolute) 0.00 - 0.85 K/uL 0.40   Eosinophils 0.00 - 6.90 % 3.10   Eos (Absolute) 0.00 - 0.51 K/uL 0.11   Basophils 0.00 - 1.80 % 0.80   Baso (Absolute) 0.00 - 0.12 K/uL 0.03   Immature Granulocytes 0.00 - 0.90 % 0.00   Immature Granulocytes (abs) 0.00 - 0.11 K/uL 0.00   Nucleated RBC 0.00 - 0.20 /100 WBC 0.00   NRBC (Absolute) K/uL 0.00   Sodium 135 - 145 mmol/L 138   Potassium 3.6 - 5.5 mmol/L 4.8   Chloride 96 - 112 mmol/L 105   Co2 20 - 33 mmol/L 27   Anion Gap 7.0 - 16.0  6.0 (L)   Glucose 65 - 99 mg/dL 83   Bun 8 - 22 mg/dL 11   Creatinine 0.50 - 1.40 mg/dL 0.58   GFR  (CKD-EPI) >60 mL/min/1.73 m 2 128   Calcium 8.5 - 10.5 mg/dL 9.2   Correct Calcium 8.5 - 10.5 mg/dL 8.9   AST(SGOT) 12 - 45 U/L 16   ALT(SGPT) 2 - 50 U/L 19   Alkaline Phosphatase 30 - 99 U/L 78   Total Bilirubin 0.1 - 1.5 mg/dL 0.2   Albumin 3.2 - 4.9 g/dL 4.4   Total Protein 6.0 - 8.2 g/dL 7.2   Globulin 1.9 - 3.5 g/dL 2.8   A-G Ratio g/dL 1.6   Uric Acid 1.9 - 8.2 mg/dL 3.9   LDH Total 107 - 266 U/L 168     Imaging: no recent      Assessment & Plan:  1. 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 Encounter for long-term (current) use of high-risk medication            Encounter for High Risk Medication Use  Toxicity: Patient is getting antineoplastic therapy and needs monitoring of blood counts, hepatic function, and renal function due to potential for organ dysfunction. Appropriate lab work has been ordered per treatment plan.     2. Treatment Plan: ABVD  Patient is meeting criteria to proceed with Cycle 2, based on the clinical and laboratory information available to me at this time.    Plan for ABVD x 2 cycles and then reassess with PET scan. Based on response, patient could continue with 2 more cycles of ABVD and radiation therapy, or 4 more cycles of AVD, to complete therapy.   -repeat labs prior to treatment  -she is tolerating treatment quite well and will continue to monitor her symptoms & notify us if they change or worsen    Future Imaging: PET after C2     Return for Follow Up:  f/up with Dr Silver after C2 PET scan to discuss next treatment steps      Any questions and concerns raised by the patient were answered to the best of my ability. Thank you for allowing me to participate in the care for this patient. Please feel free to contact me for any questions or concerns.   Total time spent on chart review, clinic encounter, and documentation: 30 minutes.   Please note that this dictation was created using voice recognition software. I  have made every reasonable attempt to correct obvious errors, but I expect that there are errors of grammar and possibly content that I did not discover before finalizing the note.

## 2025-01-25 ENCOUNTER — OUTPATIENT INFUSION SERVICES (OUTPATIENT)
Dept: ONCOLOGY | Facility: MEDICAL CENTER | Age: 25
End: 2025-01-25
Attending: STUDENT IN AN ORGANIZED HEALTH CARE EDUCATION/TRAINING PROGRAM
Payer: MEDICAID

## 2025-01-25 VITALS
BODY MASS INDEX: 25.74 KG/M2 | HEIGHT: 64 IN | RESPIRATION RATE: 14 BRPM | SYSTOLIC BLOOD PRESSURE: 110 MMHG | WEIGHT: 150.79 LBS | DIASTOLIC BLOOD PRESSURE: 68 MMHG | HEART RATE: 79 BPM | TEMPERATURE: 97.5 F | OXYGEN SATURATION: 97 %

## 2025-01-25 DIAGNOSIS — C81.72 OTHER CLASSICAL HODGKIN LYMPHOMA OF INTRATHORACIC LYMPH NODES (HCC): ICD-10-CM

## 2025-01-25 PROCEDURE — 700102 HCHG RX REV CODE 250 W/ 637 OVERRIDE(OP): Mod: UD | Performed by: NURSE PRACTITIONER

## 2025-01-25 PROCEDURE — 96375 TX/PRO/DX INJ NEW DRUG ADDON: CPT

## 2025-01-25 PROCEDURE — 96417 CHEMO IV INFUS EACH ADDL SEQ: CPT

## 2025-01-25 PROCEDURE — 96367 TX/PROPH/DG ADDL SEQ IV INF: CPT

## 2025-01-25 PROCEDURE — 96413 CHEMO IV INFUSION 1 HR: CPT

## 2025-01-25 PROCEDURE — 700105 HCHG RX REV CODE 258: Mod: UD | Performed by: NURSE PRACTITIONER

## 2025-01-25 PROCEDURE — A4212 NON CORING NEEDLE OR STYLET: HCPCS

## 2025-01-25 PROCEDURE — A9270 NON-COVERED ITEM OR SERVICE: HCPCS | Mod: UD | Performed by: NURSE PRACTITIONER

## 2025-01-25 PROCEDURE — 700111 HCHG RX REV CODE 636 W/ 250 OVERRIDE (IP): Mod: UD | Performed by: NURSE PRACTITIONER

## 2025-01-25 RX ORDER — DEXAMETHASONE SODIUM PHOSPHATE 4 MG/ML
12 INJECTION, SOLUTION INTRA-ARTICULAR; INTRALESIONAL; INTRAMUSCULAR; INTRAVENOUS; SOFT TISSUE ONCE
Status: COMPLETED | OUTPATIENT
Start: 2025-01-25 | End: 2025-01-25

## 2025-01-25 RX ORDER — ACETAMINOPHEN 325 MG/1
650 TABLET ORAL ONCE
Status: COMPLETED | OUTPATIENT
Start: 2025-01-25 | End: 2025-01-25

## 2025-01-25 RX ORDER — DIPHENHYDRAMINE HYDROCHLORIDE 50 MG/ML
25 INJECTION INTRAMUSCULAR; INTRAVENOUS ONCE
Status: COMPLETED | OUTPATIENT
Start: 2025-01-25 | End: 2025-01-25

## 2025-01-25 RX ORDER — PALONOSETRON 0.05 MG/ML
0.25 INJECTION, SOLUTION INTRAVENOUS ONCE
Status: COMPLETED | OUTPATIENT
Start: 2025-01-25 | End: 2025-01-25

## 2025-01-25 RX ADMIN — VINBLASTINE SULFATE 10.5 MG: 1 INJECTION INTRAVENOUS at 10:00

## 2025-01-25 RX ADMIN — HEPARIN 500 UNITS: 100 SYRINGE at 11:22

## 2025-01-25 RX ADMIN — SODIUM CHLORIDE 17.5 UNITS: 9 INJECTION, SOLUTION INTRAVENOUS at 09:38

## 2025-01-25 RX ADMIN — DOXORUBICIN HYDROCHLORIDE 42 MG: 2 INJECTION, SOLUTION INTRAVENOUS at 08:53

## 2025-01-25 RX ADMIN — DIPHENHYDRAMINE HYDROCHLORIDE 25 MG: 50 INJECTION, SOLUTION INTRAMUSCULAR; INTRAVENOUS at 07:58

## 2025-01-25 RX ADMIN — SODIUM CHLORIDE 656.3 MG: 9 INJECTION, SOLUTION INTRAVENOUS at 10:36

## 2025-01-25 RX ADMIN — ACETAMINOPHEN 650 MG: 325 TABLET ORAL at 07:47

## 2025-01-25 RX ADMIN — DEXAMETHASONE SODIUM PHOSPHATE 12 MG: 4 INJECTION INTRA-ARTICULAR; INTRALESIONAL; INTRAMUSCULAR; INTRAVENOUS; SOFT TISSUE at 07:52

## 2025-01-25 RX ADMIN — FOSAPREPITANT 150 MG: 150 INJECTION, POWDER, LYOPHILIZED, FOR SOLUTION INTRAVENOUS at 08:03

## 2025-01-25 RX ADMIN — PALONOSETRON HYDROCHLORIDE 0.25 MG: 0.25 INJECTION INTRAVENOUS at 07:47

## 2025-01-25 ASSESSMENT — FIBROSIS 4 INDEX: FIB4 SCORE: 0.36

## 2025-01-25 NOTE — PROGRESS NOTES
"Pharmacy Chemotherapy calculation    DX: Hodgkin Lymphoma    Cycle 2, Day 1  Previous treatment = C1D15 on 1/10/25    Regimen: ABVD  *Dosing Reference*  Doxorubicin 25 mg/m2 IV push on Days 1 and 15  Bleomycin 10 units/m2 IV over 10 minutes on Days 1 and 15  Vinblastine 6 mg/m2 IV over 5-10 minutes on Days 1 and 15  Dacarbazine 375 mg/m2 IV over 30 minutes on Days 1 and 15  Primary treatment: 28-day cycle for 2 cycles conditionally followed by Additional therapy: Based on Deauville score: 2 cycles combinted modality  NCCN Guidelines for Hodgkin Lymphoma V.3.2024.  Demetria AUGUSTE, et al. N Engl J Med. 2010;363(7):640-52.  Abhijit WASSERMAN, et al. N Engl J Med. 2015;372(17):1598-607.    Allergies: Codeine and Other misc   /68   Pulse 79   Temp 36.4 °C (97.5 °F) (Temporal)   Resp 14   Ht 1.63 m (5' 4.17\")   Wt 68.4 kg (150 lb 12.7 oz)   SpO2 97%   BMI 25.74 kg/m²   Body surface area is 1.76 meters squared.    ECHO:  11/21/24 ECHO LVEF 65%    12/17/24 PULMONARY FUNCTION TEST INTERPRETATION  IMPRESSION:    1.  There is no obstruction.  2.  There is no significant bronchodilator response.  3.  RV is elevated to 136% of unclear significance.  4.  DLCO is normal.  5.  Flow volume loop appears normal.    All labs (1/22/25) within treatment plan parameters.      Doxorubicin 25 mg/m2 x 1.76 m2 = 44 mg   <10% difference, okay to treat with final dose = 42 mg IV    Bleomycin 10 units/m2 x 1.76 m2 = 17.6 units   <10% difference, okay to treat with final dose = 17.5 units IV    Vinblastine 6 mg/m2 x 1.76 m2 = 10.6 mg   <10% difference, okay to treat with final dose = 10.5 mg IV    Dacarbazine 375 mg/m2 x 1.76 m2 = 660 mg   <10% difference, okay to treat with final dose = 656.3 mg IV      Renetta Rodriguez, PharmD  "

## 2025-01-25 NOTE — PROGRESS NOTES
Pt arrived ambulatory to [facility] for D1C2 ABVD treatment for Hodgkin lymphoma. POC discussed with pt and she agrees with plan.    Lab results reviewed from 1/22/2025, ok to proceed with treatment.     Port accessed in sterile fashion, brisk blood return noted. Pt medicated per MAR. Pt tolerated treatment without s/s adverse reaction.     Port with brisk blood return, flushed with NS then heparin. Port de-accessed, gauze dressing applied.     Pt discharged to self care, [facility]. Pt's next appointment confirmed 2/8/2025.

## 2025-01-25 NOTE — PROGRESS NOTES
Chemotherapy Verification - PRIMARY RN    D1C2    Height = 163cm  Weight = 68.4kg  BSA = 1.76m^2       Medication: DOXOrubicin  Dose: 25mg/m^2  Calculated Dose: 42.5 mg                                 Medication: bleomycin  Dose: 10units/m^2  Calculated Dose: 17.6 units                                Medication: vinBLAStine  Dose: 6mg/m^2  Calculated Dose: 10.56 mg                                 Medication: dacarbazine  Dose: 375mg/m^2  Calculated Dose: 660 mg                                 I confirm this process was performed independently with the BSA and all final chemotherapy dosing calculations congruent.  Any discrepancies of 10% or greater have been addressed with the chemotherapy pharmacist. The resolution of the discrepancy has been documented in the EPIC progress notes.

## 2025-01-25 NOTE — PROGRESS NOTES
"Pharmacy Chemotherapy Calculation:    Dx: Hodgkin's Lymphoma stage IIB       Protocol: ABVD (DOXOrubicin/Bleomycin/VinBLAStine/Dacarbazine)   *Dosing Reference*  DOXOrubicin 25 mg/m² IV push on Days 1 and 15   Bleomycin 10 units/m² IV over 10 minutes on Days 1 and 15   VinBLAStine 6 mg/m² IV over 5 - 10 minutes on Days 1 and 15   Dacarbazine 375 mg/m² IV over 30 minutes on Days 1 and 15  Primary treatment: 28-day cycle for 2 cycles conditionally followed by additional therapy: Based on Deauville score: 1 - 2 cycles or combined modality     NCCN Guidelines® for Hodgkin Lymphoma V.3.2024.   Demetria A, et al. New Engl J Med. 2010;363(7):640-52.b   Abhijit WASSERMAN, et al. N Engl J Med. 2015;372(17):1598-607.a   Yareli TENORIO et al. J Clin Oncol. 2017;35(16):5513-3063.a  Demi DJ, et al. Blood. 2018;132(10):1013- 1021.a   Eloina M, et al. J Clin Oncol. 2019;37(31):0387-4920.a    Allergies:  Codeine and Other misc     /68   Pulse 79   Temp 36.4 °C (97.5 °F) (Temporal)   Resp 14   Ht 1.63 m (5' 4.17\")   Wt 68.4 kg (150 lb 12.7 oz)   SpO2 97%   BMI 25.74 kg/m²   Body surface area is 1.76 meters squared.    12/17/24 PULMONARY FUNCTION TEST INTERPRETATION  IMPRESSION:    1.  There is no obstruction.  2.  There is no significant bronchodilator response.  3.  RV is elevated to 136% of unclear significance.  4.  DLCO is normal.  5.  Flow volume loop appears normal.  Normal PFTs.    Labs 1/22/25  ANC~ 1210 Plt = 277k   Hgb = 13     SCr = 0.66 mg/dL CrCl ~ >125 mL/min (minimum SCr of 0.7)   LFT's = WNLs  TBili = 0.3       11/21/24 15:15   Left Ventrical Ejection Fraction 65     Drug Order   (Drug name, dose, route, IV Fluid & volume, frequency, number of doses) Cycle 2 Day 1  Previous treatment: C1D15 on 1/10/25     Medication = DOXOrubicin  Base Dose = 25 mg/m2  Calc Dose: Base Dose x 1.76 m2 = 44 mg  Final Dose = 42 mg  Route = IV  Fluid & Volume = 21 mL empty bag  Admin Duration = Over 20 minutes          <10% difference, OK " to treat with final dose   Medication = Bleomycin  Base Dose = 10 units/m2  Calc Dose: Base Dose x 1.76 m2  = 17.6 units  Final Dose = 17.5 units  Route = IV  Fluid & Volume = NS 25 mL  Admin Duration = Over 10 minutes          <10% difference, OK to treat with final dose   Medication = VinBLAStine  Base Dose = 6 mg/m2  Calc Dose:Base Dose x 1.76 m2 = 10.56 mg  Final Dose = 10.5 mg  Route = IV  Fluid & Volume = NS 25 mL  Admin Duration = Over 5-10 minutes          <10% difference, OK to treat with final dose   Medication = Dacarbazine  Base Dose = 375 mg/m2  Calc Dose: Base Dose x 1.76 m2 = 660 mg  Final Dose = 656.3 mg  Route = IV  Fluid & Volume =  mL  Admin Duration = Over 30 minutes          <10% difference, OK to treat with final dose   By my signature below, I confirm this process was performed independently with the BSA and all final chemotherapy dosing calculations congruent. I have reviewed the above chemotherapy order and that my calculation of the final dose and BSA (when applicable) corroborate those calculations of the  pharmacist. Discrepancies of 10% or greater in the written dose have been addressed and documented within the EPIC Progress notes.    Chirag Bazan, LeónD

## 2025-01-25 NOTE — PROGRESS NOTES
Chemotherapy Verification - SECONDARY RN      Height = 163 cm  Weight = 68.4kg  BSA = 1.76 m2     Medication: Doxorubicin  Dose: 25 mg/m2  Calculated Dose: 44 mg                             (In mg/m2, AUC, mg/kg)     Medication: Bleomycin  Dose: 10 units/m2  Calculated Dose: 17.6 units                             (In mg/m2, AUC, mg/kg)    Medication: Vinblastine  Dose: 6 mg/m2  Calculated Dose: 10.56 mg                             (In mg/m2, AUC, mg/kg)    Medication: Dacarbazine  Dose: 375 mg/m2  Calculated Dose: 660 mg                             (In mg/m2, AUC, mg/kg)    I confirm that this process was performed independently.

## 2025-01-30 ENCOUNTER — TELEPHONE (OUTPATIENT)
Dept: HEMATOLOGY ONCOLOGY | Facility: MEDICAL CENTER | Age: 25
End: 2025-01-30
Payer: COMMERCIAL

## 2025-01-30 NOTE — TELEPHONE ENCOUNTER
I spoke to pt's mom on 1/29/25  re: adishart message regarding N/V. We discussed antiemetic regimen, red flags, anticipated side effects from treatment. Attempted to call pt on 1/29/25 to check in on symptoms, VMB not set up.   I called pt on 1/30/25 to check in & LM with office callback # if she has any concerns.

## 2025-01-31 ENCOUNTER — HOSPITAL ENCOUNTER (OUTPATIENT)
Dept: RADIOLOGY | Facility: MEDICAL CENTER | Age: 25
End: 2025-01-31
Attending: PHYSICIAN ASSISTANT
Payer: COMMERCIAL

## 2025-01-31 DIAGNOSIS — C81.72 OTHER CLASSICAL HODGKIN LYMPHOMA OF INTRATHORACIC LYMPH NODES (HCC): ICD-10-CM

## 2025-01-31 LAB — GLUCOSE BLD-MCNC: 77 MG/DL (ref 65–99)

## 2025-01-31 PROCEDURE — A9552 F18 FDG: HCPCS

## 2025-02-07 ENCOUNTER — HOSPITAL ENCOUNTER (OUTPATIENT)
Dept: LAB | Facility: MEDICAL CENTER | Age: 25
End: 2025-02-07
Attending: STUDENT IN AN ORGANIZED HEALTH CARE EDUCATION/TRAINING PROGRAM
Payer: COMMERCIAL

## 2025-02-07 DIAGNOSIS — Z79.899 ENCOUNTER FOR LONG-TERM (CURRENT) USE OF HIGH-RISK MEDICATION: ICD-10-CM

## 2025-02-07 DIAGNOSIS — C81.72 OTHER CLASSICAL HODGKIN LYMPHOMA OF INTRATHORACIC LYMPH NODES (HCC): ICD-10-CM

## 2025-02-07 LAB
ALBUMIN SERPL BCP-MCNC: 4 G/DL (ref 3.2–4.9)
ALBUMIN/GLOB SERPL: 1.7 G/DL
ALP SERPL-CCNC: 71 U/L (ref 30–99)
ALT SERPL-CCNC: 25 U/L (ref 2–50)
ANION GAP SERPL CALC-SCNC: 9 MMOL/L (ref 7–16)
AST SERPL-CCNC: 22 U/L (ref 12–45)
BASOPHILS # BLD AUTO: 0.7 % (ref 0–1.8)
BASOPHILS # BLD: 0.02 K/UL (ref 0–0.12)
BILIRUB SERPL-MCNC: <0.2 MG/DL (ref 0.1–1.5)
BUN SERPL-MCNC: 6 MG/DL (ref 8–22)
CALCIUM ALBUM COR SERPL-MCNC: 9.1 MG/DL (ref 8.5–10.5)
CALCIUM SERPL-MCNC: 9.1 MG/DL (ref 8.5–10.5)
CHLORIDE SERPL-SCNC: 108 MMOL/L (ref 96–112)
CO2 SERPL-SCNC: 25 MMOL/L (ref 20–33)
CREAT SERPL-MCNC: 0.67 MG/DL (ref 0.5–1.4)
EOSINOPHIL # BLD AUTO: 0.07 K/UL (ref 0–0.51)
EOSINOPHIL NFR BLD: 2.4 % (ref 0–6.9)
ERYTHROCYTE [DISTWIDTH] IN BLOOD BY AUTOMATED COUNT: 43.4 FL (ref 35.9–50)
GFR SERPLBLD CREATININE-BSD FMLA CKD-EPI: 124 ML/MIN/1.73 M 2
GLOBULIN SER CALC-MCNC: 2.4 G/DL (ref 1.9–3.5)
GLUCOSE SERPL-MCNC: 72 MG/DL (ref 65–99)
HCT VFR BLD AUTO: 38.4 % (ref 37–47)
HGB BLD-MCNC: 12.6 G/DL (ref 12–16)
IMM GRANULOCYTES # BLD AUTO: 0.01 K/UL (ref 0–0.11)
IMM GRANULOCYTES NFR BLD AUTO: 0.3 % (ref 0–0.9)
LYMPHOCYTES # BLD AUTO: 0.98 K/UL (ref 1–4.8)
LYMPHOCYTES NFR BLD: 33.3 % (ref 22–41)
MCH RBC QN AUTO: 27.5 PG (ref 27–33)
MCHC RBC AUTO-ENTMCNC: 32.8 G/DL (ref 32.2–35.5)
MCV RBC AUTO: 83.8 FL (ref 81.4–97.8)
MONOCYTES # BLD AUTO: 0.46 K/UL (ref 0–0.85)
MONOCYTES NFR BLD AUTO: 15.6 % (ref 0–13.4)
NEUTROPHILS # BLD AUTO: 1.4 K/UL (ref 1.82–7.42)
NEUTROPHILS NFR BLD: 47.7 % (ref 44–72)
NRBC # BLD AUTO: 0 K/UL
NRBC BLD-RTO: 0 /100 WBC (ref 0–0.2)
PLATELET # BLD AUTO: 286 K/UL (ref 164–446)
PMV BLD AUTO: 9.7 FL (ref 9–12.9)
POTASSIUM SERPL-SCNC: 4.5 MMOL/L (ref 3.6–5.5)
PROT SERPL-MCNC: 6.4 G/DL (ref 6–8.2)
RBC # BLD AUTO: 4.58 M/UL (ref 4.2–5.4)
SODIUM SERPL-SCNC: 142 MMOL/L (ref 135–145)
WBC # BLD AUTO: 2.9 K/UL (ref 4.8–10.8)

## 2025-02-07 PROCEDURE — 36415 COLL VENOUS BLD VENIPUNCTURE: CPT

## 2025-02-07 PROCEDURE — 85025 COMPLETE CBC W/AUTO DIFF WBC: CPT

## 2025-02-07 PROCEDURE — 80053 COMPREHEN METABOLIC PANEL: CPT

## 2025-02-08 ENCOUNTER — OUTPATIENT INFUSION SERVICES (OUTPATIENT)
Dept: ONCOLOGY | Facility: MEDICAL CENTER | Age: 25
End: 2025-02-08
Attending: STUDENT IN AN ORGANIZED HEALTH CARE EDUCATION/TRAINING PROGRAM
Payer: COMMERCIAL

## 2025-02-08 VITALS
BODY MASS INDEX: 26.57 KG/M2 | DIASTOLIC BLOOD PRESSURE: 76 MMHG | OXYGEN SATURATION: 98 % | TEMPERATURE: 98.1 F | HEIGHT: 64 IN | HEART RATE: 81 BPM | WEIGHT: 155.65 LBS | RESPIRATION RATE: 16 BRPM | SYSTOLIC BLOOD PRESSURE: 113 MMHG

## 2025-02-08 DIAGNOSIS — C81.72 OTHER CLASSICAL HODGKIN LYMPHOMA OF INTRATHORACIC LYMPH NODES (HCC): ICD-10-CM

## 2025-02-08 PROCEDURE — 700102 HCHG RX REV CODE 250 W/ 637 OVERRIDE(OP): Mod: UD | Performed by: NURSE PRACTITIONER

## 2025-02-08 PROCEDURE — 96375 TX/PRO/DX INJ NEW DRUG ADDON: CPT

## 2025-02-08 PROCEDURE — 96417 CHEMO IV INFUS EACH ADDL SEQ: CPT

## 2025-02-08 PROCEDURE — 96411 CHEMO IV PUSH ADDL DRUG: CPT

## 2025-02-08 PROCEDURE — 700105 HCHG RX REV CODE 258: Mod: UD | Performed by: NURSE PRACTITIONER

## 2025-02-08 PROCEDURE — A4212 NON CORING NEEDLE OR STYLET: HCPCS

## 2025-02-08 PROCEDURE — A9270 NON-COVERED ITEM OR SERVICE: HCPCS | Mod: UD | Performed by: NURSE PRACTITIONER

## 2025-02-08 PROCEDURE — 96367 TX/PROPH/DG ADDL SEQ IV INF: CPT

## 2025-02-08 PROCEDURE — 96413 CHEMO IV INFUSION 1 HR: CPT

## 2025-02-08 PROCEDURE — 700111 HCHG RX REV CODE 636 W/ 250 OVERRIDE (IP): Mod: UD | Performed by: NURSE PRACTITIONER

## 2025-02-08 RX ORDER — ACETAMINOPHEN 325 MG/1
650 TABLET ORAL ONCE
Status: COMPLETED | OUTPATIENT
Start: 2025-02-08 | End: 2025-02-08

## 2025-02-08 RX ORDER — METHYLPREDNISOLONE SODIUM SUCCINATE 125 MG/2ML
125 INJECTION, POWDER, LYOPHILIZED, FOR SOLUTION INTRAMUSCULAR; INTRAVENOUS PRN
Status: DISCONTINUED | OUTPATIENT
Start: 2025-02-08 | End: 2025-02-08 | Stop reason: HOSPADM

## 2025-02-08 RX ORDER — DIPHENHYDRAMINE HYDROCHLORIDE 50 MG/ML
25 INJECTION INTRAMUSCULAR; INTRAVENOUS ONCE
Status: COMPLETED | OUTPATIENT
Start: 2025-02-08 | End: 2025-02-08

## 2025-02-08 RX ORDER — EPINEPHRINE 1 MG/ML(1)
0.5 AMPUL (ML) INJECTION PRN
Status: DISCONTINUED | OUTPATIENT
Start: 2025-02-08 | End: 2025-02-08 | Stop reason: HOSPADM

## 2025-02-08 RX ORDER — PALONOSETRON 0.05 MG/ML
0.25 INJECTION, SOLUTION INTRAVENOUS ONCE
Status: COMPLETED | OUTPATIENT
Start: 2025-02-08 | End: 2025-02-08

## 2025-02-08 RX ORDER — DEXAMETHASONE SODIUM PHOSPHATE 4 MG/ML
12 INJECTION, SOLUTION INTRA-ARTICULAR; INTRALESIONAL; INTRAMUSCULAR; INTRAVENOUS; SOFT TISSUE ONCE
Status: COMPLETED | OUTPATIENT
Start: 2025-02-08 | End: 2025-02-08

## 2025-02-08 RX ORDER — DIPHENHYDRAMINE HYDROCHLORIDE 50 MG/ML
50 INJECTION INTRAMUSCULAR; INTRAVENOUS PRN
Status: DISCONTINUED | OUTPATIENT
Start: 2025-02-08 | End: 2025-02-08 | Stop reason: HOSPADM

## 2025-02-08 RX ADMIN — DOXORUBICIN HYDROCHLORIDE 42 MG: 2 INJECTION, SOLUTION INTRAVENOUS at 08:46

## 2025-02-08 RX ADMIN — FOSAPREPITANT 150 MG: 150 INJECTION, POWDER, LYOPHILIZED, FOR SOLUTION INTRAVENOUS at 07:40

## 2025-02-08 RX ADMIN — SODIUM CHLORIDE 17.5 UNITS: 9 INJECTION, SOLUTION INTRAVENOUS at 09:38

## 2025-02-08 RX ADMIN — ACETAMINOPHEN 650 MG: 325 TABLET ORAL at 07:40

## 2025-02-08 RX ADMIN — DEXAMETHASONE SODIUM PHOSPHATE 12 MG: 4 INJECTION INTRA-ARTICULAR; INTRALESIONAL; INTRAMUSCULAR; INTRAVENOUS; SOFT TISSUE at 07:40

## 2025-02-08 RX ADMIN — DIPHENHYDRAMINE HYDROCHLORIDE 25 MG: 50 INJECTION, SOLUTION INTRAMUSCULAR; INTRAVENOUS at 07:40

## 2025-02-08 RX ADMIN — VINBLASTINE SULFATE 10.5 MG: 1 INJECTION INTRAVENOUS at 10:05

## 2025-02-08 RX ADMIN — PALONOSETRON HYDROCHLORIDE 0.25 MG: 0.25 INJECTION INTRAVENOUS at 07:40

## 2025-02-08 RX ADMIN — SODIUM CHLORIDE 656.3 MG: 9 INJECTION, SOLUTION INTRAVENOUS at 10:39

## 2025-02-08 ASSESSMENT — FIBROSIS 4 INDEX: FIB4 SCORE: .3846153846153846154

## 2025-02-08 NOTE — PROGRESS NOTES
"Pharmacy Chemotherapy Calculation:    Dx: Hodgkin's Lymphoma stage IIB       Protocol: ABVD (DOXOrubicin/Bleomycin/VinBLAStine/Dacarbazine)   *Dosing Reference*  DOXOrubicin 25 mg/m² IV push on Days 1 and 15   Bleomycin 10 units/m² IV over 10 minutes on Days 1 and 15   VinBLAStine 6 mg/m² IV over 5 - 10 minutes on Days 1 and 15   Dacarbazine 375 mg/m² IV over 30 minutes on Days 1 and 15  Primary treatment: 28-day cycle for 2 cycles conditionally followed by additional therapy: Based on Deauville score: 1 - 2 cycles or combined modality     NCCN Guidelines® for Hodgkin Lymphoma V.3.2024.   Demetria A, et al. New Engl J Med. 2010;363(7):640-52.b   Abhijit WASSERMAN, et al. N Engl J Med. 2015;372(17):1598-607.a   Yareli TENORIO et al. J Clin Oncol. 2017;35(16):5018-7615.a  Demi DJ, et al. Blood. 2018;132(10):1013- 1021.a   Eloina M, et al. J Clin Oncol. 2019;37(31):3769-5197.a    Allergies:  Codeine and Other misc     /76   Pulse 81   Temp 36.7 °C (98.1 °F) (Temporal)   Resp 16   Ht 1.63 m (5' 4.17\")   Wt 70.6 kg (155 lb 10.3 oz)   SpO2 98%   BMI 26.57 kg/m²   Body surface area is 1.79 meters squared.    12/17/24 PULMONARY FUNCTION TEST INTERPRETATION  IMPRESSION:    1.  There is no obstruction.  2.  There is no significant bronchodilator response.  3.  RV is elevated to 136% of unclear significance.  4.  DLCO is normal.  5.  Flow volume loop appears normal.  Normal PFTs.    Labs 2/7/25  ANC~ 1400 Plt = 286k   Hgb = 12.6     SCr = 0.67 mg/dL CrCl ~ >125 mL/min   LFT's = WNLs  TBili = <0.2       11/21/24 15:15   Left Ventrical Ejection Fraction 65     Drug Order   (Drug name, dose, route, IV Fluid & volume, frequency, number of doses) Cycle 2 Day 15  Previous treatment: C2D1 on 1/25/25     Medication = DOXOrubicin  Base Dose = 25 mg/m2  Calc Dose: Base Dose x 1.79 m2 = 44.75 mg  Final Dose = 42 mg  Route = IV  Fluid & Volume = 21 mL empty bag  Admin Duration = Over 20 minutes          <10% difference, OK to treat with " final dose   Medication = Bleomycin  Base Dose = 10 units/m2  Calc Dose: Base Dose x 1.79 m2 = 17.9 units  Final Dose = 17.5 units  Route = IV  Fluid & Volume = NS 25 mL  Admin Duration = Over 10 minutes          <10% difference, OK to treat with final dose   Medication = VinBLAStine  Base Dose = 6 mg/m2  Calc Dose:Base Dose x 1.79 m2 = 10.74 mg  Final Dose = 10.5 mg  Route = IV  Fluid & Volume = NS 25 mL  Admin Duration = Over 5-10 minutes          <10% difference, OK to treat with final dose   Medication = Dacarbazine  Base Dose = 375 mg/m2  Calc Dose: Base Dose x 1.79 m2 = 671.25 mg  Final Dose = 656.3 mg  Route = IV  Fluid & Volume =  mL  Admin Duration = Over 30 minutes          <10% difference, OK to treat with final dose   By my signature below, I confirm this process was performed independently with the BSA and all final chemotherapy dosing calculations congruent. I have reviewed the above chemotherapy order and that my calculation of the final dose and BSA (when applicable) corroborate those calculations of the  pharmacist. Discrepancies of 10% or greater in the written dose have been addressed and documented within the EPIC Progress notes.    Chirag Bazan, LeónD

## 2025-02-08 NOTE — PROGRESS NOTES
Chemotherapy Verification - SECONDARY RN       Height = 163 cm  Weight = 70.6 kg  BSA = 1.79 m^2       Medication: doxorubicin  Dose: 25 mg/m^2  Calculated Dose: 44.75 mg                             (In mg/m2, AUC, mg/kg)     Medication: belomycin  Dose: 10 units/m^2  Calculated Dose: 17.9 units                             (In mg/m2, AUC, mg/kg)    Medication: vinblastine  Dose: 6 mg/m^2  Calculated Dose: 10.7 mg                             (In mg/m2, AUC, mg/kg)    Medication: dacarbazine  Dose: 375 mg/m^2  Calculated Dose: 671.25 mg                             (In mg/m2, AUC, mg/kg)        I confirm that this process was performed independently.

## 2025-02-08 NOTE — PROGRESS NOTES
Albert presents to infusion for cycle 2/ day 15 of ABVD. She denies having any new or acute complaints today, reports tolerating past treatments well. Port accessed using sterile technique, flushed with NS with positive blood return.    Labs from 02/07/25 reviewed by RN, within parameters for treatment today.     Pre-treatment meds given as ordered.     doxorubicin given over 20 minutes.  bleomycin given over 10 minutes.  vinblastine given over 7 minutes.  dacarbazine given over 30 minutes.  Patient tolerated all well with no adverse effects.     Port flushed post infusion with positive blood return and de-accessed per policy, site covered with band aid. Patient left in stable condition, knows when to return for next appointment.

## 2025-02-08 NOTE — PROGRESS NOTES
"Pharmacy Chemotherapy calculation    DX: Hodgkin Lymphoma    Cycle 2, Day 15  Previous treatment = C2D1 on 1/25/25    Regimen: ABVD  *Dosing Reference*  Doxorubicin 25 mg/m2 IV push on Days 1 and 15  Bleomycin 10 units/m2 IV over 10 minutes on Days 1 and 15  Vinblastine 6 mg/m2 IV over 5-10 minutes on Days 1 and 15  Dacarbazine 375 mg/m2 IV over 30 minutes on Days 1 and 15  Primary treatment: 28-day cycle for 2 cycles conditionally followed by Additional therapy: Based on Deauville score: 2 cycles combinted modality  NCCN Guidelines for Hodgkin Lymphoma V.3.2024.  Demetria AUGUSTE, et al. N Engl J Med. 2010;363(7):640-52.  Abhijit WASSERMAN, et al. N Engl J Med. 2015;372(17):1598-607.    Allergies: Codeine and Other misc   /76   Pulse 81   Temp 36.7 °C (98.1 °F) (Temporal)   Resp 16   Ht 1.63 m (5' 4.17\")   Wt 70.6 kg (155 lb 10.3 oz)   SpO2 98%   BMI 26.57 kg/m²   Body surface area is 1.79 meters squared.    ECHO:  11/21/24 ECHO LVEF 65%    12/17/24 PULMONARY FUNCTION TEST INTERPRETATION  IMPRESSION:    1.  There is no obstruction.  2.  There is no significant bronchodilator response.  3.  RV is elevated to 136% of unclear significance.  4.  DLCO is normal.  5.  Flow volume loop appears normal.    All labs (2/7/25) within treatment plan parameters.      Doxorubicin 25 mg/m2 x 1.79 m2 = 44.8 mg   <10% difference, okay to treat with final dose = 42 mg IV    Bleomycin 10 units/m2 x 1.79 m2 = 17.9 units   <10% difference, okay to treat with final dose = 17.5 units IV    Vinblastine 6 mg/m2 x 1.79 m2 = 10.7 mg   <10% difference, okay to treat with final dose = 10.5 mg IV    Dacarbazine 375 mg/m2 x 1.79 m2 = 671.3 mg   <10% difference, okay to treat with final dose = 656.3 mg IV      Renetta Rodriguez, PharmD  "

## 2025-02-08 NOTE — PROGRESS NOTES
Chemotherapy Verification - PRIMARY RN      Height = 1.63 m  Weight = 70.6 kg  BSA = 1.79 m2       Medication: doxorubicin (Adriamycin)  Dose: 25 mg/m2  Calculated Dose: 44.75 mg                             (In mg/m2, AUC, mg/kg)     Medication: bleomycin (Blenoxane)  Dose: 10 units/m2  Calculated Dose: 17.9 units                             (In mg/m2, AUC, mg/kg)    Medication: vinblastine (Velban)  Dose: 6 mg/m2  Calculated Dose: 10.74 mg                             (In mg/m2, AUC, mg/kg)    Medication: dacarbazine (Dtic)  Dose: 375 mg/m2  Calculated Dose: 671.25 mg                             (In mg/m2, AUC, mg/kg)      I confirm this process was performed independently with the BSA and all final chemotherapy dosing calculations congruent.  Any discrepancies of 10% or greater have been addressed with the chemotherapy pharmacist. The resolution of the discrepancy has been documented in the EPIC progress notes.

## 2025-02-17 RX ORDER — PROCHLORPERAZINE MALEATE 10 MG
10 TABLET ORAL EVERY 6 HOURS PRN
OUTPATIENT
Start: 2025-03-08

## 2025-02-17 RX ORDER — ACETAMINOPHEN 325 MG/1
650 TABLET ORAL ONCE
Status: CANCELLED | OUTPATIENT
Start: 2025-02-22 | End: 2025-02-22

## 2025-02-17 RX ORDER — 0.9 % SODIUM CHLORIDE 0.9 %
10 VIAL (ML) INJECTION PRN
Status: CANCELLED | OUTPATIENT
Start: 2025-02-21

## 2025-02-17 RX ORDER — PALONOSETRON 0.05 MG/ML
0.25 INJECTION, SOLUTION INTRAVENOUS ONCE
OUTPATIENT
Start: 2025-03-08 | End: 2025-03-08

## 2025-02-17 RX ORDER — PROCHLORPERAZINE MALEATE 10 MG
10 TABLET ORAL EVERY 6 HOURS PRN
Status: CANCELLED | OUTPATIENT
Start: 2025-02-22

## 2025-02-17 RX ORDER — 0.9 % SODIUM CHLORIDE 0.9 %
VIAL (ML) INJECTION PRN
Status: CANCELLED | OUTPATIENT
Start: 2025-02-22

## 2025-02-17 RX ORDER — EPINEPHRINE 1 MG/ML(1)
0.5 AMPUL (ML) INJECTION PRN
Status: CANCELLED | OUTPATIENT
Start: 2025-02-22

## 2025-02-17 RX ORDER — ONDANSETRON 2 MG/ML
4 INJECTION INTRAMUSCULAR; INTRAVENOUS PRN
Status: CANCELLED | OUTPATIENT
Start: 2025-02-22

## 2025-02-17 RX ORDER — ONDANSETRON 2 MG/ML
4 INJECTION INTRAMUSCULAR; INTRAVENOUS PRN
OUTPATIENT
Start: 2025-03-08

## 2025-02-17 RX ORDER — 0.9 % SODIUM CHLORIDE 0.9 %
10 VIAL (ML) INJECTION PRN
OUTPATIENT
Start: 2025-03-08

## 2025-02-17 RX ORDER — SODIUM CHLORIDE 9 MG/ML
INJECTION, SOLUTION INTRAVENOUS CONTINUOUS
OUTPATIENT
Start: 2025-03-08

## 2025-02-17 RX ORDER — DIPHENHYDRAMINE HYDROCHLORIDE 50 MG/ML
25 INJECTION INTRAMUSCULAR; INTRAVENOUS ONCE
OUTPATIENT
Start: 2025-03-08 | End: 2025-03-08

## 2025-02-17 RX ORDER — DEXAMETHASONE SODIUM PHOSPHATE 4 MG/ML
12 INJECTION, SOLUTION INTRA-ARTICULAR; INTRALESIONAL; INTRAMUSCULAR; INTRAVENOUS; SOFT TISSUE ONCE
OUTPATIENT
Start: 2025-03-08 | End: 2025-03-08

## 2025-02-17 RX ORDER — ONDANSETRON 8 MG/1
8 TABLET, ORALLY DISINTEGRATING ORAL PRN
Status: CANCELLED | OUTPATIENT
Start: 2025-02-22

## 2025-02-17 RX ORDER — ACETAMINOPHEN 325 MG/1
650 TABLET ORAL ONCE
OUTPATIENT
Start: 2025-03-08 | End: 2025-03-08

## 2025-02-17 RX ORDER — DEXAMETHASONE SODIUM PHOSPHATE 4 MG/ML
12 INJECTION, SOLUTION INTRA-ARTICULAR; INTRALESIONAL; INTRAMUSCULAR; INTRAVENOUS; SOFT TISSUE ONCE
Status: CANCELLED | OUTPATIENT
Start: 2025-02-22 | End: 2025-02-22

## 2025-02-17 RX ORDER — 0.9 % SODIUM CHLORIDE 0.9 %
3 VIAL (ML) INJECTION PRN
Status: CANCELLED | OUTPATIENT
Start: 2025-02-22

## 2025-02-17 RX ORDER — 0.9 % SODIUM CHLORIDE 0.9 %
VIAL (ML) INJECTION PRN
OUTPATIENT
Start: 2025-03-08

## 2025-02-17 RX ORDER — 0.9 % SODIUM CHLORIDE 0.9 %
10 VIAL (ML) INJECTION PRN
Status: CANCELLED | OUTPATIENT
Start: 2025-02-22

## 2025-02-17 RX ORDER — ONDANSETRON 8 MG/1
8 TABLET, ORALLY DISINTEGRATING ORAL PRN
OUTPATIENT
Start: 2025-03-08

## 2025-02-17 RX ORDER — PALONOSETRON 0.05 MG/ML
0.25 INJECTION, SOLUTION INTRAVENOUS ONCE
Status: CANCELLED | OUTPATIENT
Start: 2025-02-22 | End: 2025-02-22

## 2025-02-17 RX ORDER — 0.9 % SODIUM CHLORIDE 0.9 %
3 VIAL (ML) INJECTION PRN
Status: CANCELLED | OUTPATIENT
Start: 2025-02-21

## 2025-02-17 RX ORDER — DIPHENHYDRAMINE HYDROCHLORIDE 50 MG/ML
25 INJECTION INTRAMUSCULAR; INTRAVENOUS ONCE
Status: CANCELLED | OUTPATIENT
Start: 2025-02-22 | End: 2025-02-22

## 2025-02-17 RX ORDER — 0.9 % SODIUM CHLORIDE 0.9 %
3 VIAL (ML) INJECTION PRN
OUTPATIENT
Start: 2025-03-08

## 2025-02-17 RX ORDER — 0.9 % SODIUM CHLORIDE 0.9 %
VIAL (ML) INJECTION PRN
Status: CANCELLED | OUTPATIENT
Start: 2025-02-21

## 2025-02-17 RX ORDER — DIPHENHYDRAMINE HYDROCHLORIDE 50 MG/ML
50 INJECTION INTRAMUSCULAR; INTRAVENOUS PRN
Status: CANCELLED | OUTPATIENT
Start: 2025-02-22

## 2025-02-17 RX ORDER — EPINEPHRINE 1 MG/ML(1)
0.5 AMPUL (ML) INJECTION PRN
OUTPATIENT
Start: 2025-03-08

## 2025-02-17 RX ORDER — DIPHENHYDRAMINE HYDROCHLORIDE 50 MG/ML
50 INJECTION INTRAMUSCULAR; INTRAVENOUS PRN
OUTPATIENT
Start: 2025-03-08

## 2025-02-17 RX ORDER — SODIUM CHLORIDE 9 MG/ML
INJECTION, SOLUTION INTRAVENOUS CONTINUOUS
Status: CANCELLED | OUTPATIENT
Start: 2025-02-22

## 2025-02-17 RX ORDER — METHYLPREDNISOLONE SODIUM SUCCINATE 125 MG/2ML
125 INJECTION, POWDER, LYOPHILIZED, FOR SOLUTION INTRAMUSCULAR; INTRAVENOUS PRN
Status: CANCELLED | OUTPATIENT
Start: 2025-02-22

## 2025-02-17 RX ORDER — METHYLPREDNISOLONE SODIUM SUCCINATE 125 MG/2ML
125 INJECTION, POWDER, LYOPHILIZED, FOR SOLUTION INTRAMUSCULAR; INTRAVENOUS PRN
OUTPATIENT
Start: 2025-03-08

## 2025-02-19 ENCOUNTER — HOSPITAL ENCOUNTER (OUTPATIENT)
Dept: HEMATOLOGY ONCOLOGY | Facility: MEDICAL CENTER | Age: 25
End: 2025-02-19
Attending: STUDENT IN AN ORGANIZED HEALTH CARE EDUCATION/TRAINING PROGRAM
Payer: COMMERCIAL

## 2025-02-19 ENCOUNTER — HOSPITAL ENCOUNTER (OUTPATIENT)
Dept: LAB | Facility: MEDICAL CENTER | Age: 25
End: 2025-02-19
Attending: STUDENT IN AN ORGANIZED HEALTH CARE EDUCATION/TRAINING PROGRAM
Payer: COMMERCIAL

## 2025-02-19 VITALS
HEART RATE: 91 BPM | DIASTOLIC BLOOD PRESSURE: 72 MMHG | WEIGHT: 155.4 LBS | TEMPERATURE: 97.2 F | HEIGHT: 64 IN | OXYGEN SATURATION: 99 % | BODY MASS INDEX: 26.53 KG/M2 | SYSTOLIC BLOOD PRESSURE: 120 MMHG

## 2025-02-19 DIAGNOSIS — C81.72 OTHER CLASSICAL HODGKIN LYMPHOMA OF INTRATHORACIC LYMPH NODES (HCC): ICD-10-CM

## 2025-02-19 LAB
BASOPHILS # BLD AUTO: 0.7 % (ref 0–1.8)
BASOPHILS # BLD: 0.03 K/UL (ref 0–0.12)
EOSINOPHIL # BLD AUTO: 0.06 K/UL (ref 0–0.51)
EOSINOPHIL NFR BLD: 1.4 % (ref 0–6.9)
ERYTHROCYTE [DISTWIDTH] IN BLOOD BY AUTOMATED COUNT: 44.4 FL (ref 35.9–50)
HCT VFR BLD AUTO: 42.5 % (ref 37–47)
HGB BLD-MCNC: 13.8 G/DL (ref 12–16)
IMM GRANULOCYTES # BLD AUTO: 0.01 K/UL (ref 0–0.11)
IMM GRANULOCYTES NFR BLD AUTO: 0.2 % (ref 0–0.9)
LYMPHOCYTES # BLD AUTO: 0.98 K/UL (ref 1–4.8)
LYMPHOCYTES NFR BLD: 22.8 % (ref 22–41)
MCH RBC QN AUTO: 27 PG (ref 27–33)
MCHC RBC AUTO-ENTMCNC: 32.5 G/DL (ref 32.2–35.5)
MCV RBC AUTO: 83 FL (ref 81.4–97.8)
MONOCYTES # BLD AUTO: 0.34 K/UL (ref 0–0.85)
MONOCYTES NFR BLD AUTO: 7.9 % (ref 0–13.4)
NEUTROPHILS # BLD AUTO: 2.87 K/UL (ref 1.82–7.42)
NEUTROPHILS NFR BLD: 67 % (ref 44–72)
NRBC # BLD AUTO: 0 K/UL
NRBC BLD-RTO: 0 /100 WBC (ref 0–0.2)
PLATELET # BLD AUTO: 341 K/UL (ref 164–446)
PMV BLD AUTO: 9.7 FL (ref 9–12.9)
RBC # BLD AUTO: 5.12 M/UL (ref 4.2–5.4)
WBC # BLD AUTO: 4.3 K/UL (ref 4.8–10.8)

## 2025-02-19 PROCEDURE — 83615 LACTATE (LD) (LDH) ENZYME: CPT

## 2025-02-19 PROCEDURE — 84550 ASSAY OF BLOOD/URIC ACID: CPT

## 2025-02-19 PROCEDURE — 36415 COLL VENOUS BLD VENIPUNCTURE: CPT

## 2025-02-19 PROCEDURE — 99214 OFFICE O/P EST MOD 30 MIN: CPT | Performed by: STUDENT IN AN ORGANIZED HEALTH CARE EDUCATION/TRAINING PROGRAM

## 2025-02-19 PROCEDURE — 99212 OFFICE O/P EST SF 10 MIN: CPT | Performed by: STUDENT IN AN ORGANIZED HEALTH CARE EDUCATION/TRAINING PROGRAM

## 2025-02-19 PROCEDURE — 80053 COMPREHEN METABOLIC PANEL: CPT

## 2025-02-19 PROCEDURE — 85025 COMPLETE CBC W/AUTO DIFF WBC: CPT

## 2025-02-19 ASSESSMENT — ENCOUNTER SYMPTOMS
CONSTIPATION: 0
PALPITATIONS: 0
ORTHOPNEA: 0
ABDOMINAL PAIN: 0
SHORTNESS OF BREATH: 0
SPUTUM PRODUCTION: 0
INSOMNIA: 0
WEIGHT LOSS: 0
BACK PAIN: 0
DIZZINESS: 0
CHILLS: 0
DIARRHEA: 0
NAUSEA: 0
HEARTBURN: 0
DOUBLE VISION: 0
BRUISES/BLEEDS EASILY: 0
NERVOUS/ANXIOUS: 0
WHEEZING: 0
MYALGIAS: 0
BLOOD IN STOOL: 0
SORE THROAT: 0
VOMITING: 0
WEAKNESS: 0
TINGLING: 0
COUGH: 0
FEVER: 0
SINUS PAIN: 0
BLURRED VISION: 0
DIAPHORESIS: 0
HEADACHES: 0

## 2025-02-19 ASSESSMENT — FIBROSIS 4 INDEX: FIB4 SCORE: .3846153846153846154

## 2025-02-19 ASSESSMENT — PAIN SCALES - GENERAL: PAINLEVEL_OUTOF10: NO PAIN

## 2025-02-19 NOTE — PROGRESS NOTES
Follow Up Note:  Hematology/Oncology      Primary Care:  Margie Rhodes P.A.-C.    Diagnosis: Classical Hodgkin's lymphoma, stage IIB    Chief Complaint: On-treatment visit    Current Treatment: AVD x 4 more cycles    Prior Treatment: ABVD x 2 cycles    Oncology History of Presenting Illness:  Albert Gillette is a 24 y.o. [place] woman who presents to the clinic for evaluation for systemic therapy for a new diagnosis of classical Hodgkin's lymphoma. She noticed a lump in her neck at the beginning of August and ultimately was referred to our intake oncology coordinator after US showed lymphadenopathy in the left neck. She had it monitored and they did not change in size at all, but did protrude with certain movements. She also reports having significant night sweats and fatigue over the past several months. She was sent for a CT scan of the soft tissue/neck which revealed more nodes in the anterior mediastinum. She ultimately went through a core biopsy which revealed classical Hodgkin's lymphoma. She underwent a PET scan which revealed stage IIA disease. She presents for evaluation accordingly.     Treatment History:   12/27/24: C1D1 ABVD  01/25/25: C2D1 ABVD *delayed 1 day due to test at school (planned)  02/22/25: C3 AVD scheduled    Interval History:  Patient is here for follow up visit. She is doing well and has no complaints. She does feel a bit fatigued and nauseous for a few days after each round of chemotherapy, and has a bad taste in her mouth occasionally. She otherwise is feeling fine.     Allergies as of 02/19/2025 - Reviewed 02/19/2025   Allergen Reaction Noted    Codeine  07/24/2024    Other misc  12/06/2024         Current Outpatient Medications:     lidocaine-prilocaine (EMLA) 2.5-2.5 % Cream, Apply to port 1 hour prior to access of port and cover with plastic wrap., Disp: 1 g, Rfl: 3    ondansetron (ZOFRAN) 4 MG Tab tablet, Take 1 Tablet by mouth every four hours as needed for  "Nausea/Vomiting (for nausea, vomiting)., Disp: 30 Tablet, Rfl: 6    prochlorperazine (COMPAZINE) 10 MG Tab, Take 1 Tablet by mouth every 6 hours as needed (for nausea, vomiting)., Disp: 30 Tablet, Rfl: 6    OLANZapine (ZYPREXA) 5 MG Tab, Take 1 Tablet by mouth every evening., Disp: 30 Tablet, Rfl: 6    Levonorgestrel (MIRENA, 52 MG, IU), by Intrauterine route. MG unknown, Disp: , Rfl:       Review of Systems:  Review of Systems   Constitutional:  Negative for chills, diaphoresis, fever, malaise/fatigue and weight loss.   HENT:  Negative for hearing loss, nosebleeds, sinus pain and sore throat.    Eyes:  Negative for blurred vision and double vision.   Respiratory:  Negative for cough, sputum production, shortness of breath and wheezing.    Cardiovascular:  Negative for chest pain, palpitations, orthopnea and leg swelling.   Gastrointestinal:  Negative for abdominal pain, blood in stool, constipation, diarrhea, heartburn, melena, nausea and vomiting.   Genitourinary:  Negative for dysuria, frequency, hematuria and urgency.   Musculoskeletal:  Negative for back pain, joint pain and myalgias.   Skin:  Negative for rash.   Neurological:  Negative for dizziness, tingling, weakness and headaches.   Endo/Heme/Allergies:  Does not bruise/bleed easily.   Psychiatric/Behavioral:  The patient is not nervous/anxious and does not have insomnia.          Physical Exam:  Vitals:    02/19/25 1307   BP: 120/72   Pulse: 91   Temp: 36.2 °C (97.2 °F)   TempSrc: Temporal   SpO2: 99%   Weight: 70.5 kg (155 lb 6.4 oz)   Height: 1.63 m (5' 4.17\")       DESC; KARNOFSKY SCALE WITH ECOG EQUIVALENT: 100, Fully active, able to carry on all pre-disease performed without restriction (ECOG equivalent 0)    DISTRESS LEVEL: no apparent distress    Physical Exam  Vitals and nursing note reviewed.   Constitutional:       General: She is awake. She is not in acute distress.     Appearance: Normal appearance. She is normal weight. She is not " ill-appearing, toxic-appearing or diaphoretic.   HENT:      Head: Normocephalic and atraumatic.      Nose: Nose normal. No congestion.      Mouth/Throat:      Pharynx: Oropharynx is clear. No oropharyngeal exudate or posterior oropharyngeal erythema.   Eyes:      General: No scleral icterus.     Extraocular Movements: Extraocular movements intact.      Conjunctiva/sclera: Conjunctivae normal.      Pupils: Pupils are equal, round, and reactive to light.   Cardiovascular:      Rate and Rhythm: Normal rate and regular rhythm.      Pulses: Normal pulses.      Heart sounds: Normal heart sounds. No murmur heard.     No friction rub. No gallop.   Pulmonary:      Effort: Pulmonary effort is normal.      Breath sounds: Normal breath sounds. No decreased air movement. No wheezing, rhonchi or rales.   Abdominal:      General: Bowel sounds are normal. There is no distension.      Tenderness: There is no abdominal tenderness.   Musculoskeletal:         General: No deformity. Normal range of motion.      Cervical back: Normal range of motion and neck supple. No tenderness.      Right lower leg: No edema.      Left lower leg: No edema.   Lymphadenopathy:      Cervical: No cervical adenopathy.      Upper Body:      Right upper body: No axillary adenopathy.      Left upper body: No axillary adenopathy.      Lower Body: No right inguinal adenopathy. No left inguinal adenopathy.   Skin:     General: Skin is warm and dry.      Coloration: Skin is not jaundiced.      Findings: No erythema or rash.   Neurological:      General: No focal deficit present.      Mental Status: She is alert and oriented to person, place, and time.      Sensory: Sensation is intact.      Motor: Motor function is intact. No weakness.      Gait: Gait is intact.   Psychiatric:         Attention and Perception: Attention normal.         Mood and Affect: Mood normal.         Behavior: Behavior normal. Behavior is cooperative.         Thought Content: Thought  content normal.         Judgment: Judgment normal.           Labs:  No visits with results within 7 Day(s) from this visit.   Latest known visit with results is:   Hospital Outpatient Visit on 02/07/2025   Component Date Value Ref Range Status    WBC 02/07/2025 2.9 (L)  4.8 - 10.8 K/uL Final    RBC 02/07/2025 4.58  4.20 - 5.40 M/uL Final    Hemoglobin 02/07/2025 12.6  12.0 - 16.0 g/dL Final    Hematocrit 02/07/2025 38.4  37.0 - 47.0 % Final    MCV 02/07/2025 83.8  81.4 - 97.8 fL Final    MCH 02/07/2025 27.5  27.0 - 33.0 pg Final    MCHC 02/07/2025 32.8  32.2 - 35.5 g/dL Final    RDW 02/07/2025 43.4  35.9 - 50.0 fL Final    Platelet Count 02/07/2025 286  164 - 446 K/uL Final    MPV 02/07/2025 9.7  9.0 - 12.9 fL Final    Neutrophils-Polys 02/07/2025 47.70  44.00 - 72.00 % Final    Lymphocytes 02/07/2025 33.30  22.00 - 41.00 % Final    Monocytes 02/07/2025 15.60 (H)  0.00 - 13.40 % Final    Eosinophils 02/07/2025 2.40  0.00 - 6.90 % Final    Basophils 02/07/2025 0.70  0.00 - 1.80 % Final    Immature Granulocytes 02/07/2025 0.30  0.00 - 0.90 % Final    Nucleated RBC 02/07/2025 0.00  0.00 - 0.20 /100 WBC Final    Neutrophils (Absolute) 02/07/2025 1.40 (L)  1.82 - 7.42 K/uL Final    Includes immature neutrophils, if present.    Lymphs (Absolute) 02/07/2025 0.98 (L)  1.00 - 4.80 K/uL Final    Monos (Absolute) 02/07/2025 0.46  0.00 - 0.85 K/uL Final    Eos (Absolute) 02/07/2025 0.07  0.00 - 0.51 K/uL Final    Baso (Absolute) 02/07/2025 0.02  0.00 - 0.12 K/uL Final    Immature Granulocytes (abs) 02/07/2025 0.01  0.00 - 0.11 K/uL Final    NRBC (Absolute) 02/07/2025 0.00  K/uL Final    Sodium 02/07/2025 142  135 - 145 mmol/L Final    Potassium 02/07/2025 4.5  3.6 - 5.5 mmol/L Final    Chloride 02/07/2025 108  96 - 112 mmol/L Final    Co2 02/07/2025 25  20 - 33 mmol/L Final    Anion Gap 02/07/2025 9.0  7.0 - 16.0 Final    Glucose 02/07/2025 72  65 - 99 mg/dL Final    Bun 02/07/2025 6 (L)  8 - 22 mg/dL Final    Creatinine  02/07/2025 0.67  0.50 - 1.40 mg/dL Final    Calcium 02/07/2025 9.1  8.5 - 10.5 mg/dL Final    Correct Calcium 02/07/2025 9.1  8.5 - 10.5 mg/dL Final    AST(SGOT) 02/07/2025 22  12 - 45 U/L Final    ALT(SGPT) 02/07/2025 25  2 - 50 U/L Final    Alkaline Phosphatase 02/07/2025 71  30 - 99 U/L Final    Total Bilirubin 02/07/2025 <0.2  0.1 - 1.5 mg/dL Final    Albumin 02/07/2025 4.0  3.2 - 4.9 g/dL Final    Total Protein 02/07/2025 6.4  6.0 - 8.2 g/dL Final    Globulin 02/07/2025 2.4  1.9 - 3.5 g/dL Final    A-G Ratio 02/07/2025 1.7  g/dL Final    GFR (CKD-EPI) 02/07/2025 124  >60 mL/min/1.73 m 2 Final    Comment: Estimated Glomerular Filtration Rate is calculated using  race neutral CKD-EPI 2021 equation per NKF-ASN recommendations.         Imaging:     All listed images below have been independently reviewed by me. I agree with the findings as summarized below:    [id number] BASE TO MID-THIGH  Result Date: 1/31/2025 1/31/2025 1:46 PM HISTORY/REASON FOR EXAM:  hodgkin lymphoma f/up after starting chemo, response will determine next tx step TECHNIQUE/EXAM DESCRIPTION AND NUMBER OF VIEWS: PET body imaging. Initially, 11.9 mCi F-18 FDG was administered intravenously under standardized conditions. Approximately 45 minutes after FDG administration, the patient was placed in the supine position on the PET CT table. Blood glucose level was 77 mg/dL. Low dose spiral CT imaging was performed from the skull base to the mid thighs. PET imaging was then performed from the skull base to the mid thighs. CT images, PET images, and PET/CT fused images were reviewed on a PACS 3D workstation. The limited non-contrast CT data are used primarily for attenuation correction and anatomic correlation.  Evaluation of solid organs and bowel are especially limited utilizing this technique. A low dose CT was obtained of the same area without IV contrast for attenuation correction and coregistration, not for a diagnostic scan. COMPARISON:  11/25/2024. FINDINGS: VISUALIZED BRAIN: Normal metabolic activity. HEAD AND NECK: Normal physiologic uptake. Resolution of prior hypermetabolic lymph nodes in the neck. CHEST: Background blood pool activity shows average SUV of 1.8. Lungs show no hypermetabolic pulmonary nodules. Normal thymus activity. There is no hypermetabolic hilar, mediastinal, or axillary lymphadenopathy. ABDOMEN AND PELVIS: Background liver activity shows average SUV of 2.4. There is normal, uniform metabolic activity in the liver, spleen, adrenal glands, kidneys. There is no hypermetabolic mesenteric, retroperitoneal, iliac, or inguinal lymphadenopathy. Nonspecific physiologic activity in the colon and intestine is noted. VISUALIZED MUSCULOSKELETAL SYSTEM: No hypermetabolic activity to suggest osteolytic metastases or sclerosis to suggest osteoblastic metastases.     1. No evidence of abnormal FDG accumulation. 2. Resolution of prior hypermetabolic lymph nodes in the neck and mediastinum. Deauville score of 1      Pathology:    FINAL DIAGNOSIS:     A. Left cervical lymph node:          Lymph node cores demonstrating classic Hodgkin lymphoma with           bands of fibrosis noted.     PRECURSOR AND MATURE LYMPHOID MALIGNANCIES     TUMOR    Site(s) of Tumor Involvement in Sample:  Lymph node     Final Integrated Diagnosis:  Classic Hodgkin lymphoma   SPECIAL STUDIES     Immunohistochemistry:  Please refer to microscopic description below     for details.     Flow Cytometry:  No aberrancy detected at level of sensitivity of     assay     Conventional Cytogenetics:  Not performed     Fluorescence in situ Hybridization:  Not performed     Molecular Alterations Detected:  Not performed.     Comment: This case has been reviewed by a second pathologist, Dr. Ivan, who concurs with the diagnosis.                                         Diagnosis performed by:                                       BETSEY BLACK MD     Assessment & Plan:  1.  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 She presents for evaluation.      Current Diagnosis and Staging: Classical Hodgkin's lymphoma, stage IIB    Update: Patient is doing well and had a path CR on PET scan. Will de-escalate to 4 more cycles of AVD.      Treatment Plan: AVD x 4 cycles to complete therapy     Treatment Citation: NCCN     Plan of Care:     Primary Therapy: C3 AVD on Saturday  Supportive Therapy: Antiemetics per protocol  Toxicity: Patient is getting antineoplastic therapy and needs monitoring of blood counts, hepatic function, and renal function due to potential for organ dysfunction.   Labs: CBC with diff, CMP, LDH monitoring  Imaging: Repeat PET scan after C6  Treatment Planning: Patient will start therapy with ABVD x 2 cycles and then reassess with PET scan. PET scan showed CR so she will continue with 4 more cycles of AVD, to complete therapy.   Consultations: Surgical oncology (Dr. Navarro)  Code Status: Full  Miscellaneous: NA  Return for Follow Up: 4 weeks    Any questions and concerns raised by the patient were answered to the best of my ability. Thank you for allowing me to participate in the care for this patient. Please feel free to contact me for any questions or concerns.       Total time spent on chart review, clinic encounter, and documentation: 25 minutes.

## 2025-02-20 LAB
ALBUMIN SERPL BCP-MCNC: 4.7 G/DL (ref 3.2–4.9)
ALBUMIN/GLOB SERPL: 1.7 G/DL
ALP SERPL-CCNC: 75 U/L (ref 30–99)
ALT SERPL-CCNC: 42 U/L (ref 2–50)
ANION GAP SERPL CALC-SCNC: 12 MMOL/L (ref 7–16)
AST SERPL-CCNC: 25 U/L (ref 12–45)
BILIRUB SERPL-MCNC: <0.2 MG/DL (ref 0.1–1.5)
BUN SERPL-MCNC: 8 MG/DL (ref 8–22)
CALCIUM ALBUM COR SERPL-MCNC: 9.1 MG/DL (ref 8.5–10.5)
CALCIUM SERPL-MCNC: 9.7 MG/DL (ref 8.5–10.5)
CHLORIDE SERPL-SCNC: 106 MMOL/L (ref 96–112)
CO2 SERPL-SCNC: 24 MMOL/L (ref 20–33)
CREAT SERPL-MCNC: 0.65 MG/DL (ref 0.5–1.4)
GFR SERPLBLD CREATININE-BSD FMLA CKD-EPI: 125 ML/MIN/1.73 M 2
GLOBULIN SER CALC-MCNC: 2.8 G/DL (ref 1.9–3.5)
GLUCOSE SERPL-MCNC: 86 MG/DL (ref 65–99)
LDH SERPL L TO P-CCNC: 189 U/L (ref 107–266)
POTASSIUM SERPL-SCNC: 4.6 MMOL/L (ref 3.6–5.5)
PROT SERPL-MCNC: 7.5 G/DL (ref 6–8.2)
SODIUM SERPL-SCNC: 142 MMOL/L (ref 135–145)
URATE SERPL-MCNC: 4.3 MG/DL (ref 1.9–8.2)

## 2025-02-22 ENCOUNTER — OUTPATIENT INFUSION SERVICES (OUTPATIENT)
Dept: ONCOLOGY | Facility: MEDICAL CENTER | Age: 25
End: 2025-02-22
Attending: STUDENT IN AN ORGANIZED HEALTH CARE EDUCATION/TRAINING PROGRAM
Payer: COMMERCIAL

## 2025-02-22 VITALS
HEIGHT: 63 IN | RESPIRATION RATE: 16 BRPM | BODY MASS INDEX: 27.5 KG/M2 | WEIGHT: 155.2 LBS | DIASTOLIC BLOOD PRESSURE: 74 MMHG | OXYGEN SATURATION: 97 % | SYSTOLIC BLOOD PRESSURE: 115 MMHG | HEART RATE: 100 BPM | TEMPERATURE: 98 F

## 2025-02-22 DIAGNOSIS — C81.72 OTHER CLASSICAL HODGKIN LYMPHOMA OF INTRATHORACIC LYMPH NODES (HCC): ICD-10-CM

## 2025-02-22 PROCEDURE — 700111 HCHG RX REV CODE 636 W/ 250 OVERRIDE (IP): Mod: UD | Performed by: NURSE PRACTITIONER

## 2025-02-22 PROCEDURE — 96375 TX/PRO/DX INJ NEW DRUG ADDON: CPT

## 2025-02-22 PROCEDURE — 96413 CHEMO IV INFUSION 1 HR: CPT

## 2025-02-22 PROCEDURE — 700105 HCHG RX REV CODE 258: Mod: UD | Performed by: NURSE PRACTITIONER

## 2025-02-22 PROCEDURE — 96417 CHEMO IV INFUS EACH ADDL SEQ: CPT

## 2025-02-22 PROCEDURE — A4212 NON CORING NEEDLE OR STYLET: HCPCS

## 2025-02-22 PROCEDURE — A9270 NON-COVERED ITEM OR SERVICE: HCPCS | Mod: UD | Performed by: NURSE PRACTITIONER

## 2025-02-22 PROCEDURE — 96411 CHEMO IV PUSH ADDL DRUG: CPT

## 2025-02-22 PROCEDURE — 96367 TX/PROPH/DG ADDL SEQ IV INF: CPT

## 2025-02-22 PROCEDURE — 700102 HCHG RX REV CODE 250 W/ 637 OVERRIDE(OP): Mod: UD | Performed by: NURSE PRACTITIONER

## 2025-02-22 RX ORDER — ACETAMINOPHEN 325 MG/1
650 TABLET ORAL ONCE
Status: COMPLETED | OUTPATIENT
Start: 2025-02-22 | End: 2025-02-22

## 2025-02-22 RX ORDER — PALONOSETRON 0.05 MG/ML
0.25 INJECTION, SOLUTION INTRAVENOUS ONCE
Status: COMPLETED | OUTPATIENT
Start: 2025-02-22 | End: 2025-02-22

## 2025-02-22 RX ORDER — DEXAMETHASONE SODIUM PHOSPHATE 4 MG/ML
12 INJECTION, SOLUTION INTRA-ARTICULAR; INTRALESIONAL; INTRAMUSCULAR; INTRAVENOUS; SOFT TISSUE ONCE
Status: COMPLETED | OUTPATIENT
Start: 2025-02-22 | End: 2025-02-22

## 2025-02-22 RX ORDER — DIPHENHYDRAMINE HYDROCHLORIDE 50 MG/ML
25 INJECTION INTRAMUSCULAR; INTRAVENOUS ONCE
Status: COMPLETED | OUTPATIENT
Start: 2025-02-22 | End: 2025-02-22

## 2025-02-22 RX ADMIN — ACETAMINOPHEN 650 MG: 325 TABLET ORAL at 07:34

## 2025-02-22 RX ADMIN — DIPHENHYDRAMINE HYDROCHLORIDE 25 MG: 50 INJECTION INTRAMUSCULAR; INTRAVENOUS at 07:34

## 2025-02-22 RX ADMIN — FOSAPREPITANT 150 MG: 150 INJECTION, POWDER, LYOPHILIZED, FOR SOLUTION INTRAVENOUS at 07:35

## 2025-02-22 RX ADMIN — DOXORUBICIN HYDROCHLORIDE 42 MG: 2 INJECTION, SOLUTION INTRAVENOUS at 08:24

## 2025-02-22 RX ADMIN — PALONOSETRON HYDROCHLORIDE 0.25 MG: 0.25 INJECTION INTRAVENOUS at 07:34

## 2025-02-22 RX ADMIN — SODIUM CHLORIDE 656.3 MG: 9 INJECTION, SOLUTION INTRAVENOUS at 09:22

## 2025-02-22 RX ADMIN — DEXAMETHASONE SODIUM PHOSPHATE 12 MG: 4 INJECTION INTRA-ARTICULAR; INTRALESIONAL; INTRAMUSCULAR; INTRAVENOUS; SOFT TISSUE at 07:34

## 2025-02-22 RX ADMIN — VINBLASTINE SULFATE 10.5 MG: 1 INJECTION INTRAVENOUS at 09:07

## 2025-02-22 ASSESSMENT — FIBROSIS 4 INDEX: FIB4 SCORE: 0.28

## 2025-02-22 NOTE — PROGRESS NOTES
Chemotherapy Verification - SECONDARY RN       Height = 1.61m  Weight = 70.4kg  BSA = 1.77m2       Medication: doxorubicin  Dose: 25mg/m2  Calculated Dose: 44.25mg                             (In mg/m2, AUC, mg/kg)     Medication: vinblastine  Dose: 6mg/m2  Calculated Dose: 10.62 mg                             (In mg/m2, AUC, mg/kg)    Medication: dacarbazine  Dose: 375mg/m2  Calculated Dose: 663.75 mg                             (In mg/m2, AUC, mg/kg)        I confirm that this process was performed independently.

## 2025-02-22 NOTE — PROGRESS NOTES
"Pharmacy Chemotherapy calculation    DX: Hodgkin Lymphoma    Cycle 3, Day 1  Previous treatment = C2D15 on 2/8/25    Regimen:  --> de-escalate to AVD   *Dosing Reference*  Doxorubicin 25 mg/m2 IV push on Days 1 and 15   - 2/22/25: removed from treatment plan per MD  Vinblastine 6 mg/m2 IV over 5-10 minutes on Days 1 and 15  Dacarbazine 375 mg/m2 IV over 30 minutes on Days 1 and 15  Primary treatment: 28-day cycle for 2 cycles conditionally followed by   Additional therapy: Based on Deauville score: 2 cycles combined modality   - 2/22/25: therapy changed to AVD and to be continued x4 more cycles  NCCN Guidelines for Hodgkin Lymphoma V.3.2024.  Demetria A, et al. N Engl J Med. 2010;363(7):640-52.  Abhijit WASSERMAN et al. N Engl J Med. 2015;372(17):1598-607.    Allergies: Codeine and Other misc   /74   Pulse 100   Temp 36.7 °C (98 °F) (Temporal)   Resp 16   Ht 1.61 m (5' 3.39\")   Wt 70.4 kg (155 lb 3.3 oz)   SpO2 97%   BMI 27.16 kg/m²   Body surface area is 1.77 meters squared.    ECHO:  11/21/24 ECHO LVEF 65%    12/17/24 PULMONARY FUNCTION TEST INTERPRETATION  IMPRESSION:    1.  There is no obstruction.  2.  There is no significant bronchodilator response.  3.  RV is elevated to 136% of unclear significance.  4.  DLCO is normal.  5.  Flow volume loop appears normal.    All labs (2/19/25) within treatment plan parameters.      Doxorubicin 25 mg/m2 x 1.77 m2 = 44.3 mg   <10% difference, okay to treat with final dose = 42 mg IV    Vinblastine 6 mg/m2 x 1.77 m2 = 10.6 mg   <10% difference, okay to treat with final dose = 10.5 mg IV    Dacarbazine 375 mg/m2 x 1.77 m2 = 663.8 mg   <10% difference, okay to treat with final dose = 656.3 mg IV      Renetta Rodriguez, PharmD  "

## 2025-02-22 NOTE — PROGRESS NOTES
"Pharmacy Chemotherapy Calculation:    Dx: Hodgkin's Lymphoma stage IIB       Protocol: AVD (DOXOrubicin/VinBLAStine/Dacarbazine)  *Dosing Reference*  DOXOrubicin 25 mg/m² IV push on Days 1 and 15   VinBLAStine 6 mg/m² IV over 5 - 10 minutes on Days 1 and 15   Dacarbazine 375 mg/m² IV over 30 minutes on Days 1 and 15  28-day cycle for 4 cycles conditionally following 2 cycles of ABVD or 2 cycles of BEACOPP based on Deauville score    NCCN Guidelines® for Hodgkin Lymphoma V.3.2024.   Clarence P, et al. N Engl J Med. 2016;374(25):2415-29.a    Allergies:  Codeine and Other misc       LMP 11/23/2024 (Approximate)  /74   Pulse 100   Temp 36.7 °C (98 °F) (Temporal)   Resp 16   Ht 1.61 m (5' 3.39\")   Wt 70.4 kg (155 lb 3.3 oz)   SpO2 97%   BMI 27.16 kg/m²   Body surface area is 1.77 meters squared.    12/17/24 PULMONARY FUNCTION TEST INTERPRETATION    IMPRESSION:    1.  There is no obstruction.  2.  There is no significant bronchodilator response.  3.  RV is elevated to 136% of unclear significance.  4.  DLCO is normal.  5.  Flow volume loop appears normal.    Normal PFTs.    Labs 2/19/25:  ANC~ 2870 Plt = 341k   Hgb = 13.8     SCr = 0.65 mg/dL CrCl > 125 mL/min (min SCr 0.7 used)  AST/ALT/AP = 25/42/75 TBili < 0.2        11/21/24 15:15   Left Ventrical Ejection Fraction 65      Latest Reference Range & Units 12/10/24 08:40   Beta-Hcg Qualitative Serum Negative  Negative     Drug Order   (Drug name, dose, route, IV Fluid & volume, frequency, number of doses) Cycle 3 Day 1  Previous treatment: C2D15 2/8/25     Medication = DOXOrubicin  Base Dose = 25 mg/m2  Calc Dose: Base Dose x 1.77 m2 = 44.2 mg  Final Dose = 42 mg  Route = IV  Fluid & Volume = 21 mL empty bag  Admin Duration = Over 10 minutes          <10% difference, OK to treat with final dose   Medication = VinBLAStine  Base Dose = 6 mg/m2  Calc Dose:Base Dose x 1.77 m2 = 10.62 mg  Final Dose = 10.5 mg  Route = IV  Fluid & Volume = NS 25 mL  Admin Duration " = Over 10 minutes          <10% difference, OK to treat with final dose   Medication = Dacarbazine  Base Dose = 375 mg/m2  Calc Dose: Base Dose x 1.77 m2 = 663.75 mg  Final Dose = 656.3 mg  Route = IV  Fluid & Volume =  mL  Admin Duration = Over 30 minutes          <10% difference, OK to treat with final dose     By my signature below, I confirm this process was performed independently with the BSA and all final chemotherapy dosing calculations congruent. I have reviewed the above chemotherapy order and that my calculation of the final dose and BSA (when applicable) corroborate those calculations of the  pharmacist. Discrepancies of 10% or greater in the written dose have been addressed and documented within the EPIC Progress notes.    León PrinceD

## 2025-02-22 NOTE — PROGRESS NOTES
Albert presents to infusion for cycle 3/ day 1 of AVD. She reports tolerating past treatments well. Upon assessment it was noted that she had dermetitis from a new skin care product but has been improving and does not bother her. She stated that during her last treatment she became nauseated with NS flushes; alcohol swab, mints and candy were recommended while flushing to mitigate the effects.     Port accessed using sterile technique, flushed with NS with positive blood return. No adverse effects reported by patient when flushing NS after having a mint.      Labs from 02/20/25 reviewed by RN, within parameters for treatment today.      Pre-treatment meds given as ordered.      doxorubicin given over 20 minutes.  vinblastine given over 5 minutes.  dacarbazine given over 30 minutes.  Patient tolerated all well with no adverse effects.      Port flushed post infusion with positive blood return and de-accessed per policy, site covered with band aid. Patient left in stable condition, knows when to return for next appointment.

## 2025-02-22 NOTE — PROGRESS NOTES
Chemotherapy Verification - PRIMARY RN      Height = 1.61 m  Weight = 70.4 kg  BSA = 1.77 m2      Medication: doxorubicin (Adriamycin)  Dose: 25 mg/m2  Calculated Dose: 44.25 mg                             (In mg/m2, AUC, mg/kg)     Medication: vinblastine (Velban)  Dose: 6 mg/m2  Calculated Dose: 10.62 mg                             (In mg/m2, AUC, mg/kg)    Medication: dacarbazine (Dtic)  Dose: 375 mg/m2  Calculated Dose: 663.75 mg                             (In mg/m2, AUC, mg/kg)    I confirm this process was performed independently with the BSA and all final chemotherapy dosing calculations congruent.  Any discrepancies of 10% or greater have been addressed with the chemotherapy pharmacist. The resolution of the discrepancy has been documented in the EPIC progress notes.

## 2025-03-07 ENCOUNTER — HOSPITAL ENCOUNTER (OUTPATIENT)
Dept: LAB | Facility: MEDICAL CENTER | Age: 25
End: 2025-03-07
Attending: STUDENT IN AN ORGANIZED HEALTH CARE EDUCATION/TRAINING PROGRAM
Payer: COMMERCIAL

## 2025-03-07 DIAGNOSIS — C81.72 OTHER CLASSICAL HODGKIN LYMPHOMA OF INTRATHORACIC LYMPH NODES (HCC): ICD-10-CM

## 2025-03-07 DIAGNOSIS — Z79.899 ENCOUNTER FOR LONG-TERM (CURRENT) USE OF HIGH-RISK MEDICATION: ICD-10-CM

## 2025-03-07 LAB
ALBUMIN SERPL BCP-MCNC: 4.4 G/DL (ref 3.2–4.9)
ALBUMIN/GLOB SERPL: 1.7 G/DL
ALP SERPL-CCNC: 70 U/L (ref 30–99)
ALT SERPL-CCNC: 21 U/L (ref 2–50)
ANION GAP SERPL CALC-SCNC: 10 MMOL/L (ref 7–16)
AST SERPL-CCNC: 22 U/L (ref 12–45)
BASOPHILS # BLD AUTO: 0.7 % (ref 0–1.8)
BASOPHILS # BLD: 0.02 K/UL (ref 0–0.12)
BILIRUB SERPL-MCNC: 0.3 MG/DL (ref 0.1–1.5)
BUN SERPL-MCNC: 9 MG/DL (ref 8–22)
CALCIUM ALBUM COR SERPL-MCNC: 9.4 MG/DL (ref 8.5–10.5)
CALCIUM SERPL-MCNC: 9.7 MG/DL (ref 8.5–10.5)
CHLORIDE SERPL-SCNC: 106 MMOL/L (ref 96–112)
CO2 SERPL-SCNC: 25 MMOL/L (ref 20–33)
CREAT SERPL-MCNC: 0.72 MG/DL (ref 0.5–1.4)
EOSINOPHIL # BLD AUTO: 0.07 K/UL (ref 0–0.51)
EOSINOPHIL NFR BLD: 2.4 % (ref 0–6.9)
ERYTHROCYTE [DISTWIDTH] IN BLOOD BY AUTOMATED COUNT: 45.5 FL (ref 35.9–50)
GFR SERPLBLD CREATININE-BSD FMLA CKD-EPI: 119 ML/MIN/1.73 M 2
GLOBULIN SER CALC-MCNC: 2.6 G/DL (ref 1.9–3.5)
GLUCOSE SERPL-MCNC: 89 MG/DL (ref 65–99)
HCT VFR BLD AUTO: 40.4 % (ref 37–47)
HGB BLD-MCNC: 13.5 G/DL (ref 12–16)
IMM GRANULOCYTES # BLD AUTO: 0.01 K/UL (ref 0–0.11)
IMM GRANULOCYTES NFR BLD AUTO: 0.3 % (ref 0–0.9)
LYMPHOCYTES # BLD AUTO: 0.89 K/UL (ref 1–4.8)
LYMPHOCYTES NFR BLD: 30.3 % (ref 22–41)
MCH RBC QN AUTO: 27.6 PG (ref 27–33)
MCHC RBC AUTO-ENTMCNC: 33.4 G/DL (ref 32.2–35.5)
MCV RBC AUTO: 82.4 FL (ref 81.4–97.8)
MONOCYTES # BLD AUTO: 0.48 K/UL (ref 0–0.85)
MONOCYTES NFR BLD AUTO: 16.3 % (ref 0–13.4)
NEUTROPHILS # BLD AUTO: 1.47 K/UL (ref 1.82–7.42)
NEUTROPHILS NFR BLD: 50 % (ref 44–72)
NRBC # BLD AUTO: 0 K/UL
NRBC BLD-RTO: 0 /100 WBC (ref 0–0.2)
PLATELET # BLD AUTO: 305 K/UL (ref 164–446)
PMV BLD AUTO: 9.7 FL (ref 9–12.9)
POTASSIUM SERPL-SCNC: 4.3 MMOL/L (ref 3.6–5.5)
PROT SERPL-MCNC: 7 G/DL (ref 6–8.2)
RBC # BLD AUTO: 4.9 M/UL (ref 4.2–5.4)
SODIUM SERPL-SCNC: 141 MMOL/L (ref 135–145)
WBC # BLD AUTO: 2.9 K/UL (ref 4.8–10.8)

## 2025-03-07 PROCEDURE — 80053 COMPREHEN METABOLIC PANEL: CPT

## 2025-03-07 PROCEDURE — 36415 COLL VENOUS BLD VENIPUNCTURE: CPT

## 2025-03-07 PROCEDURE — 85025 COMPLETE CBC W/AUTO DIFF WBC: CPT

## 2025-03-07 NOTE — PROGRESS NOTES
"Pharmacy Chemotherapy Calculation:    Dx: Hodgkin's Lymphoma stage IIB       Protocol: AVD (DOXOrubicin/VinBLAStine/Dacarbazine)  *Dosing Reference*  DOXOrubicin 25 mg/m² IV push on Days 1 and 15   VinBLAStine 6 mg/m² IV over 5 - 10 minutes on Days 1 and 15   Dacarbazine 375 mg/m² IV over 30 minutes on Days 1 and 15  28-day cycle for 4 cycles conditionally following 2 cycles of ABVD or 2 cycles of BEACOPP based on Deauville score  NCCN Guidelines® for Hodgkin Lymphoma V.3.2024.   Clarence P, et al. N Engl J Med. 2016;374(25):2414-29.a    Allergies:  Codeine and Other misc     LMP 11/23/2024 (Approximate)  /86   Pulse (!) 122   Temp 36.3 °C (97.3 °F) (Temporal)   Resp 17   Ht 1.61 m (5' 3.39\")   Wt 69.4 kg (153 lb)   SpO2 98%   BMI 26.77 kg/m²   Body surface area is 1.76 meters squared.    12/17/24 PULMONARY FUNCTION TEST INTERPRETATION    IMPRESSION:    1.  There is no obstruction.  2.  There is no significant bronchodilator response.  3.  RV is elevated to 136% of unclear significance.  4.  DLCO is normal.  5.  Flow volume loop appears normal.    Normal PFTs.      11/21/24 15:15   Left Ventrical Ejection Fraction 65     All labs (3/7/25) within treatment plan parameters.      Drug Order   (Drug name, dose, route, IV Fluid & volume, frequency, number of doses) Cycle 3, Day 15  Previous treatment: C3D1 on 2/22/25     Medication = DOXOrubicin  Base Dose = 25 mg/m2  Calc Dose: Base Dose x 1.76 m2 = 44 mg  Final Dose = 42 mg  Route = IV  Fluid & Volume = 21 mL empty bag  Admin Duration = Over 20 minutes          <10% difference, OK to treat with final dose   Medication = VinBLAStine  Base Dose = 6 mg/m2  Calc Dose:Base Dose x 1.76 m2 = 10.56 mg  Final Dose = 10.5 mg  Route = IV  Fluid & Volume = NS 25 mL  Admin Duration = Over 5-10 minutes          <10% difference, OK to treat with final dose   Medication = Dacarbazine  Base Dose = 375 mg/m2  Calc Dose: Base Dose x 1.76 m2 = 660 mg  Final Dose = 656.3 " mg  Route = IV  Fluid & Volume =  mL  Admin Duration = Over 30 minutes          <10% difference, OK to treat with final dose     By my signature below, I confirm this process was performed independently with the BSA and all final chemotherapy dosing calculations congruent. I have reviewed the above chemotherapy order and that my calculation of the final dose and BSA (when applicable) corroborate those calculations of the  pharmacist. Discrepancies of 10% or greater in the written dose have been addressed and documented within the EPIC Progress notes.    Renetta Rodriguez, PharmD

## 2025-03-07 NOTE — PROGRESS NOTES
"Pharmacy Chemotherapy calculation    DX: Hodgkin Lymphoma    Cycle 3, Day 15  Previous treatment = C3D1 on 2/22/25    Regimen:  --> de-escalate to AVD   *Dosing Reference*  Doxorubicin 25 mg/m2 IV push on Days 1 and 15   - 2/22/25: removed from treatment plan per MD  Vinblastine 6 mg/m2 IV over 5-10 minutes on Days 1 and 15  Dacarbazine 375 mg/m2 IV over 30 minutes on Days 1 and 15  Primary treatment: 28-day cycle for 2 cycles conditionally followed by   Additional therapy: Based on Deauville score: 2 cycles combined modality   - 2/22/25: therapy changed to AVD and to be continued x4 more cycles  NCCN Guidelines for Hodgkin Lymphoma V.3.2024.  Demetria A, et al. N Engl J Med. 2010;363(7):640-52.  Abhijit WASSERMAN et al. N Engl J Med. 2015;372(17):1598-607.    Allergies: Codeine and Other misc   /86   Pulse (!) 122   Temp 36.3 °C (97.3 °F) (Temporal)   Resp 17   Ht 1.61 m (5' 3.39\")   Wt 69.4 kg (153 lb)   SpO2 98%   BMI 26.77 kg/m²   Body surface area is 1.76 meters squared.    ECHO:  11/21/24 ECHO LVEF 65%    12/17/24 PULMONARY FUNCTION TEST INTERPRETATION  IMPRESSION:    1.  There is no obstruction.  2.  There is no significant bronchodilator response.  3.  RV is elevated to 136% of unclear significance.  4.  DLCO is normal.  5.  Flow volume loop appears normal.    Labs 3/7/25:  ANC~ 1470 Plt = 305k   Hgb = 13.5     SCr = 0.72 mg/dL CrCl ~ >125 mL/min   AST/ALT/AP = WNL TBili =  0.3     Doxorubicin 25 mg/m2 x 1.76 m2 = 44 mg   <10% difference, okay to treat with final dose = 42 mg IV    Vinblastine 6 mg/m2 x 1.76 m2 = 10.56 mg   <10% difference, okay to treat with final dose = 10.5 mg IV    Dacarbazine 375 mg/m2 x 1.76 m2 = 660 mg   <10% difference, okay to treat with final dose = 656.3 mg IV      León AjD  "

## 2025-03-08 ENCOUNTER — OUTPATIENT INFUSION SERVICES (OUTPATIENT)
Dept: ONCOLOGY | Facility: MEDICAL CENTER | Age: 25
End: 2025-03-08
Attending: STUDENT IN AN ORGANIZED HEALTH CARE EDUCATION/TRAINING PROGRAM
Payer: MEDICAID

## 2025-03-08 VITALS
BODY MASS INDEX: 27.11 KG/M2 | TEMPERATURE: 97.3 F | HEART RATE: 122 BPM | DIASTOLIC BLOOD PRESSURE: 86 MMHG | SYSTOLIC BLOOD PRESSURE: 125 MMHG | WEIGHT: 153 LBS | OXYGEN SATURATION: 98 % | RESPIRATION RATE: 17 BRPM | HEIGHT: 63 IN

## 2025-03-08 DIAGNOSIS — C81.72 OTHER CLASSICAL HODGKIN LYMPHOMA OF INTRATHORACIC LYMPH NODES (HCC): ICD-10-CM

## 2025-03-08 PROCEDURE — 96417 CHEMO IV INFUS EACH ADDL SEQ: CPT

## 2025-03-08 PROCEDURE — 700111 HCHG RX REV CODE 636 W/ 250 OVERRIDE (IP): Mod: JZ,UD | Performed by: NURSE PRACTITIONER

## 2025-03-08 PROCEDURE — 96367 TX/PROPH/DG ADDL SEQ IV INF: CPT

## 2025-03-08 PROCEDURE — 96413 CHEMO IV INFUSION 1 HR: CPT

## 2025-03-08 PROCEDURE — 700105 HCHG RX REV CODE 258: Mod: UD | Performed by: NURSE PRACTITIONER

## 2025-03-08 PROCEDURE — 96375 TX/PRO/DX INJ NEW DRUG ADDON: CPT

## 2025-03-08 PROCEDURE — 700102 HCHG RX REV CODE 250 W/ 637 OVERRIDE(OP): Mod: UD | Performed by: NURSE PRACTITIONER

## 2025-03-08 PROCEDURE — A9270 NON-COVERED ITEM OR SERVICE: HCPCS | Mod: UD | Performed by: NURSE PRACTITIONER

## 2025-03-08 PROCEDURE — 96411 CHEMO IV PUSH ADDL DRUG: CPT

## 2025-03-08 PROCEDURE — A4212 NON CORING NEEDLE OR STYLET: HCPCS

## 2025-03-08 RX ORDER — DIPHENHYDRAMINE HYDROCHLORIDE 50 MG/ML
25 INJECTION, SOLUTION INTRAMUSCULAR; INTRAVENOUS ONCE
Status: COMPLETED | OUTPATIENT
Start: 2025-03-08 | End: 2025-03-08

## 2025-03-08 RX ORDER — PALONOSETRON 0.05 MG/ML
0.25 INJECTION, SOLUTION INTRAVENOUS ONCE
Status: COMPLETED | OUTPATIENT
Start: 2025-03-08 | End: 2025-03-08

## 2025-03-08 RX ORDER — DEXAMETHASONE SODIUM PHOSPHATE 4 MG/ML
12 INJECTION, SOLUTION INTRA-ARTICULAR; INTRALESIONAL; INTRAMUSCULAR; INTRAVENOUS; SOFT TISSUE ONCE
Status: COMPLETED | OUTPATIENT
Start: 2025-03-08 | End: 2025-03-08

## 2025-03-08 RX ORDER — ACETAMINOPHEN 325 MG/1
650 TABLET ORAL ONCE
Status: COMPLETED | OUTPATIENT
Start: 2025-03-08 | End: 2025-03-08

## 2025-03-08 RX ADMIN — FOSAPREPITANT 150 MG: 150 INJECTION, POWDER, LYOPHILIZED, FOR SOLUTION INTRAVENOUS at 07:27

## 2025-03-08 RX ADMIN — SODIUM CHLORIDE 656.3 MG: 9 INJECTION, SOLUTION INTRAVENOUS at 09:18

## 2025-03-08 RX ADMIN — VINBLASTINE SULFATE 10.5 MG: 1 INJECTION INTRAVENOUS at 08:59

## 2025-03-08 RX ADMIN — ACETAMINOPHEN 650 MG: 325 TABLET ORAL at 07:26

## 2025-03-08 RX ADMIN — DOXORUBICIN HYDROCHLORIDE 42 MG: 2 INJECTION, SOLUTION INTRAVENOUS at 08:23

## 2025-03-08 RX ADMIN — PALONOSETRON HYDROCHLORIDE 0.25 MG: 0.25 INJECTION INTRAVENOUS at 07:26

## 2025-03-08 RX ADMIN — DIPHENHYDRAMINE HYDROCHLORIDE 25 MG: 50 INJECTION INTRAMUSCULAR; INTRAVENOUS at 07:26

## 2025-03-08 RX ADMIN — DEXAMETHASONE SODIUM PHOSPHATE 12 MG: 4 INJECTION INTRA-ARTICULAR; INTRALESIONAL; INTRAMUSCULAR; INTRAVENOUS; SOFT TISSUE at 07:27

## 2025-03-08 ASSESSMENT — FIBROSIS 4 INDEX: FIB4 SCORE: 0.39

## 2025-03-08 NOTE — PROGRESS NOTES
Chemotherapy Verification - PRIMARY RN      Height = 1.61 m  Weight = 69.4 kg  BSA = 1.76 m2       Medication: doxorubicin (Adriamycin)  Dose: 25 mg/m2  Calculated Dose: 44 mg                             (In mg/m2, AUC, mg/kg)     Medication: vinblastine (Velban)  Dose: 6 mg/m2  Calculated Dose: 10.56 mg                             (In mg/m2, AUC, mg/kg)    Medication: dacarbazine (Dtic)  Dose: 375 mg/m2  Calculated Dose: 660 mg                             (In mg/m2, AUC, mg/kg)    I confirm this process was performed independently with the BSA and all final chemotherapy dosing calculations congruent.  Any discrepancies of 10% or greater have been addressed with the chemotherapy pharmacist. The resolution of the discrepancy has been documented in the EPIC progress notes.

## 2025-03-08 NOTE — PROGRESS NOTES
Albert presents to infusion for cycle 3/ day 15 of AVD. She reports tolerating past treatments well with no new changes.      Port accessed using sterile technique, flushed with NS with positive blood return.      Labs from 03/07/25 reviewed by RN, within parameters for treatment today.      Pre-treatment meds given as ordered.      doxorubicin given over 20 minutes.  vinblastine given over 5 minutes.  dacarbazine given over 30 minutes.  Patient tolerated all well with no adverse effects.      Port flushed post infusion with positive blood return and de-accessed per policy. She then left in stable condition, knows when to return for next appointment.

## 2025-03-08 NOTE — PROGRESS NOTES
Chemotherapy Verification - SECONDARY RN       Height = 161 cm  Weight = 69.4 kg  BSA = 1.76 m2       Medication: Doxorubicin  Dose: 25 mg/m2  Calculated Dose: 44 mg                             (In mg/m2, AUC, mg/kg)     Medication: Vinblastine  Dose: 6 mg/m2  Calculated Dose: 10.56 mg                             (In mg/m2, AUC, mg/kg)    Medication: DTIC  Dose: 375 mg/m2  Calculated Dose: 660 mg                             (In mg/m2, AUC, mg/kg)      I confirm that this process was performed independently.

## 2025-03-17 RX ORDER — PALONOSETRON 0.05 MG/ML
0.25 INJECTION, SOLUTION INTRAVENOUS ONCE
OUTPATIENT
Start: 2025-04-05 | End: 2025-04-05

## 2025-03-17 RX ORDER — EPINEPHRINE 1 MG/ML(1)
0.5 AMPUL (ML) INJECTION PRN
OUTPATIENT
Start: 2025-04-05

## 2025-03-17 RX ORDER — PALONOSETRON 0.05 MG/ML
0.25 INJECTION, SOLUTION INTRAVENOUS ONCE
Status: CANCELLED | OUTPATIENT
Start: 2025-03-22 | End: 2025-03-22

## 2025-03-17 RX ORDER — 0.9 % SODIUM CHLORIDE 0.9 %
10 VIAL (ML) INJECTION PRN
OUTPATIENT
Start: 2025-04-05

## 2025-03-17 RX ORDER — DEXAMETHASONE SODIUM PHOSPHATE 4 MG/ML
12 INJECTION, SOLUTION INTRA-ARTICULAR; INTRALESIONAL; INTRAMUSCULAR; INTRAVENOUS; SOFT TISSUE ONCE
Status: CANCELLED | OUTPATIENT
Start: 2025-03-22 | End: 2025-03-22

## 2025-03-17 RX ORDER — ACETAMINOPHEN 325 MG/1
650 TABLET ORAL ONCE
Status: CANCELLED | OUTPATIENT
Start: 2025-03-22 | End: 2025-03-22

## 2025-03-17 RX ORDER — 0.9 % SODIUM CHLORIDE 0.9 %
3 VIAL (ML) INJECTION PRN
Status: CANCELLED | OUTPATIENT
Start: 2025-03-22

## 2025-03-17 RX ORDER — 0.9 % SODIUM CHLORIDE 0.9 %
10 VIAL (ML) INJECTION PRN
Status: CANCELLED | OUTPATIENT
Start: 2025-03-22

## 2025-03-17 RX ORDER — ONDANSETRON 2 MG/ML
4 INJECTION INTRAMUSCULAR; INTRAVENOUS PRN
Status: CANCELLED | OUTPATIENT
Start: 2025-03-22

## 2025-03-17 RX ORDER — 0.9 % SODIUM CHLORIDE 0.9 %
10 VIAL (ML) INJECTION PRN
Status: CANCELLED | OUTPATIENT
Start: 2025-03-21

## 2025-03-17 RX ORDER — METHYLPREDNISOLONE SODIUM SUCCINATE 125 MG/2ML
125 INJECTION, POWDER, LYOPHILIZED, FOR SOLUTION INTRAMUSCULAR; INTRAVENOUS PRN
OUTPATIENT
Start: 2025-04-05

## 2025-03-17 RX ORDER — SODIUM CHLORIDE 9 MG/ML
INJECTION, SOLUTION INTRAVENOUS CONTINUOUS
Status: CANCELLED | OUTPATIENT
Start: 2025-03-22

## 2025-03-17 RX ORDER — ACETAMINOPHEN 325 MG/1
650 TABLET ORAL ONCE
OUTPATIENT
Start: 2025-04-05 | End: 2025-04-05

## 2025-03-17 RX ORDER — DIPHENHYDRAMINE HYDROCHLORIDE 50 MG/ML
25 INJECTION, SOLUTION INTRAMUSCULAR; INTRAVENOUS ONCE
Status: CANCELLED | OUTPATIENT
Start: 2025-03-22 | End: 2025-03-22

## 2025-03-17 RX ORDER — DIPHENHYDRAMINE HYDROCHLORIDE 50 MG/ML
25 INJECTION, SOLUTION INTRAMUSCULAR; INTRAVENOUS ONCE
OUTPATIENT
Start: 2025-04-05 | End: 2025-04-05

## 2025-03-17 RX ORDER — 0.9 % SODIUM CHLORIDE 0.9 %
3 VIAL (ML) INJECTION PRN
OUTPATIENT
Start: 2025-04-05

## 2025-03-17 RX ORDER — DEXAMETHASONE SODIUM PHOSPHATE 4 MG/ML
12 INJECTION, SOLUTION INTRA-ARTICULAR; INTRALESIONAL; INTRAMUSCULAR; INTRAVENOUS; SOFT TISSUE ONCE
OUTPATIENT
Start: 2025-04-05 | End: 2025-04-05

## 2025-03-17 RX ORDER — ONDANSETRON 8 MG/1
8 TABLET, ORALLY DISINTEGRATING ORAL PRN
Status: CANCELLED | OUTPATIENT
Start: 2025-03-22

## 2025-03-17 RX ORDER — ONDANSETRON 8 MG/1
8 TABLET, ORALLY DISINTEGRATING ORAL PRN
OUTPATIENT
Start: 2025-04-05

## 2025-03-17 RX ORDER — EPINEPHRINE 1 MG/ML(1)
0.5 AMPUL (ML) INJECTION PRN
Status: CANCELLED | OUTPATIENT
Start: 2025-03-22

## 2025-03-17 RX ORDER — 0.9 % SODIUM CHLORIDE 0.9 %
VIAL (ML) INJECTION PRN
Status: CANCELLED | OUTPATIENT
Start: 2025-03-21

## 2025-03-17 RX ORDER — SODIUM CHLORIDE 9 MG/ML
INJECTION, SOLUTION INTRAVENOUS CONTINUOUS
OUTPATIENT
Start: 2025-04-05

## 2025-03-17 RX ORDER — METHYLPREDNISOLONE SODIUM SUCCINATE 125 MG/2ML
125 INJECTION, POWDER, LYOPHILIZED, FOR SOLUTION INTRAMUSCULAR; INTRAVENOUS PRN
Status: CANCELLED | OUTPATIENT
Start: 2025-03-22

## 2025-03-17 RX ORDER — 0.9 % SODIUM CHLORIDE 0.9 %
VIAL (ML) INJECTION PRN
OUTPATIENT
Start: 2025-04-05

## 2025-03-17 RX ORDER — ONDANSETRON 2 MG/ML
4 INJECTION INTRAMUSCULAR; INTRAVENOUS PRN
OUTPATIENT
Start: 2025-04-05

## 2025-03-17 RX ORDER — PROCHLORPERAZINE MALEATE 10 MG
10 TABLET ORAL EVERY 6 HOURS PRN
OUTPATIENT
Start: 2025-04-05

## 2025-03-17 RX ORDER — PROCHLORPERAZINE MALEATE 10 MG
10 TABLET ORAL EVERY 6 HOURS PRN
Status: CANCELLED | OUTPATIENT
Start: 2025-03-22

## 2025-03-17 RX ORDER — 0.9 % SODIUM CHLORIDE 0.9 %
VIAL (ML) INJECTION PRN
Status: CANCELLED | OUTPATIENT
Start: 2025-03-22

## 2025-03-17 RX ORDER — DIPHENHYDRAMINE HYDROCHLORIDE 50 MG/ML
50 INJECTION, SOLUTION INTRAMUSCULAR; INTRAVENOUS PRN
Status: CANCELLED | OUTPATIENT
Start: 2025-03-22

## 2025-03-17 RX ORDER — DIPHENHYDRAMINE HYDROCHLORIDE 50 MG/ML
50 INJECTION, SOLUTION INTRAMUSCULAR; INTRAVENOUS PRN
OUTPATIENT
Start: 2025-04-05

## 2025-03-17 RX ORDER — 0.9 % SODIUM CHLORIDE 0.9 %
3 VIAL (ML) INJECTION PRN
Status: CANCELLED | OUTPATIENT
Start: 2025-03-21

## 2025-03-21 ENCOUNTER — HOSPITAL ENCOUNTER (OUTPATIENT)
Dept: HEMATOLOGY ONCOLOGY | Facility: MEDICAL CENTER | Age: 25
End: 2025-03-21
Attending: NURSE PRACTITIONER
Payer: COMMERCIAL

## 2025-03-21 ENCOUNTER — HOSPITAL ENCOUNTER (OUTPATIENT)
Dept: LAB | Facility: MEDICAL CENTER | Age: 25
End: 2025-03-21
Attending: STUDENT IN AN ORGANIZED HEALTH CARE EDUCATION/TRAINING PROGRAM
Payer: COMMERCIAL

## 2025-03-21 VITALS
HEART RATE: 110 BPM | SYSTOLIC BLOOD PRESSURE: 110 MMHG | RESPIRATION RATE: 14 BRPM | HEIGHT: 63 IN | DIASTOLIC BLOOD PRESSURE: 68 MMHG | OXYGEN SATURATION: 98 % | TEMPERATURE: 98.8 F | WEIGHT: 151.5 LBS | BODY MASS INDEX: 26.84 KG/M2

## 2025-03-21 DIAGNOSIS — R74.01 TRANSAMINITIS: ICD-10-CM

## 2025-03-21 DIAGNOSIS — Z79.899 ENCOUNTER FOR LONG-TERM CURRENT USE OF HIGH RISK MEDICATION: ICD-10-CM

## 2025-03-21 DIAGNOSIS — C81.72 OTHER CLASSICAL HODGKIN LYMPHOMA OF INTRATHORACIC LYMPH NODES (HCC): ICD-10-CM

## 2025-03-21 DIAGNOSIS — R74.02 ELEVATED LDH: ICD-10-CM

## 2025-03-21 LAB
ALBUMIN SERPL BCP-MCNC: 4.5 G/DL (ref 3.2–4.9)
ALBUMIN/GLOB SERPL: 1.6 G/DL
ALP SERPL-CCNC: 67 U/L (ref 30–99)
ALT SERPL-CCNC: 103 U/L (ref 2–50)
ANION GAP SERPL CALC-SCNC: 13 MMOL/L (ref 7–16)
AST SERPL-CCNC: 281 U/L (ref 12–45)
BASOPHILS # BLD AUTO: 0.5 % (ref 0–1.8)
BASOPHILS # BLD: 0.02 K/UL (ref 0–0.12)
BILIRUB SERPL-MCNC: 0.3 MG/DL (ref 0.1–1.5)
BUN SERPL-MCNC: 9 MG/DL (ref 8–22)
CALCIUM ALBUM COR SERPL-MCNC: 9.2 MG/DL (ref 8.5–10.5)
CALCIUM SERPL-MCNC: 9.6 MG/DL (ref 8.5–10.5)
CHLORIDE SERPL-SCNC: 107 MMOL/L (ref 96–112)
CO2 SERPL-SCNC: 21 MMOL/L (ref 20–33)
CREAT SERPL-MCNC: 0.71 MG/DL (ref 0.5–1.4)
EOSINOPHIL # BLD AUTO: 0.05 K/UL (ref 0–0.51)
EOSINOPHIL NFR BLD: 1.2 % (ref 0–6.9)
ERYTHROCYTE [DISTWIDTH] IN BLOOD BY AUTOMATED COUNT: 46.4 FL (ref 35.9–50)
GFR SERPLBLD CREATININE-BSD FMLA CKD-EPI: 121 ML/MIN/1.73 M 2
GLOBULIN SER CALC-MCNC: 2.9 G/DL (ref 1.9–3.5)
GLUCOSE SERPL-MCNC: 87 MG/DL (ref 65–99)
HCT VFR BLD AUTO: 41 % (ref 37–47)
HGB BLD-MCNC: 13.8 G/DL (ref 12–16)
IMM GRANULOCYTES # BLD AUTO: 0.01 K/UL (ref 0–0.11)
IMM GRANULOCYTES NFR BLD AUTO: 0.2 % (ref 0–0.9)
LDH SERPL L TO P-CCNC: 401 U/L (ref 107–266)
LYMPHOCYTES # BLD AUTO: 1.01 K/UL (ref 1–4.8)
LYMPHOCYTES NFR BLD: 24.5 % (ref 22–41)
MCH RBC QN AUTO: 28 PG (ref 27–33)
MCHC RBC AUTO-ENTMCNC: 33.7 G/DL (ref 32.2–35.5)
MCV RBC AUTO: 83.3 FL (ref 81.4–97.8)
MONOCYTES # BLD AUTO: 0.55 K/UL (ref 0–0.85)
MONOCYTES NFR BLD AUTO: 13.3 % (ref 0–13.4)
NEUTROPHILS # BLD AUTO: 2.48 K/UL (ref 1.82–7.42)
NEUTROPHILS NFR BLD: 60.3 % (ref 44–72)
NRBC # BLD AUTO: 0 K/UL
NRBC BLD-RTO: 0 /100 WBC (ref 0–0.2)
PLATELET # BLD AUTO: 321 K/UL (ref 164–446)
PMV BLD AUTO: 9.4 FL (ref 9–12.9)
POTASSIUM SERPL-SCNC: 4.3 MMOL/L (ref 3.6–5.5)
PROT SERPL-MCNC: 7.4 G/DL (ref 6–8.2)
RBC # BLD AUTO: 4.92 M/UL (ref 4.2–5.4)
SODIUM SERPL-SCNC: 141 MMOL/L (ref 135–145)
URATE SERPL-MCNC: 4.7 MG/DL (ref 1.9–8.2)
WBC # BLD AUTO: 4.1 K/UL (ref 4.8–10.8)

## 2025-03-21 PROCEDURE — 99214 OFFICE O/P EST MOD 30 MIN: CPT | Performed by: NURSE PRACTITIONER

## 2025-03-21 PROCEDURE — 36415 COLL VENOUS BLD VENIPUNCTURE: CPT

## 2025-03-21 PROCEDURE — 99212 OFFICE O/P EST SF 10 MIN: CPT | Performed by: NURSE PRACTITIONER

## 2025-03-21 PROCEDURE — 84550 ASSAY OF BLOOD/URIC ACID: CPT

## 2025-03-21 PROCEDURE — 85025 COMPLETE CBC W/AUTO DIFF WBC: CPT

## 2025-03-21 PROCEDURE — 83615 LACTATE (LD) (LDH) ENZYME: CPT

## 2025-03-21 PROCEDURE — 80053 COMPREHEN METABOLIC PANEL: CPT

## 2025-03-21 ASSESSMENT — ENCOUNTER SYMPTOMS
HEADACHES: 0
DIZZINESS: 1
NERVOUS/ANXIOUS: 1
PALPITATIONS: 0
CHILLS: 0
FEVER: 0
DIARRHEA: 0
SHORTNESS OF BREATH: 1
CONSTIPATION: 0
NAUSEA: 0
DIAPHORESIS: 0
WEIGHT LOSS: 0
BLOOD IN STOOL: 0
WHEEZING: 0
VOMITING: 0
TINGLING: 0
INSOMNIA: 0
MYALGIAS: 0
COUGH: 0

## 2025-03-21 ASSESSMENT — FIBROSIS 4 INDEX: FIB4 SCORE: 0.39

## 2025-03-21 ASSESSMENT — PAIN SCALES - GENERAL: PAINLEVEL_OUTOF10: NO PAIN

## 2025-03-21 NOTE — PROGRESS NOTES
"Geovanny Gillette is a 24 y.o. female who presents with Lymphoma (prechemo)            HPI    C4  Spouse      Review of Systems   Constitutional:  Negative for chills, diaphoresis, fever, malaise/fatigue (more activity this past week, a few days after (2-4)) and weight loss (stable).   Respiratory:  Positive for shortness of breath (with speech). Negative for cough and wheezing.    Cardiovascular:  Negative for chest pain, palpitations and leg swelling.   Gastrointestinal:  Negative for blood in stool, constipation (Last BM today), diarrhea, melena, nausea (zofran PRN; better with snacks) and vomiting.   Genitourinary:  Negative for dysuria and hematuria.   Musculoskeletal:  Negative for joint pain and myalgias. Falls: maybe a little.  Neurological:  Positive for dizziness (if up too quick, week of chemo). Negative for tingling and headaches.   Psychiatric/Behavioral:  The patient is nervous/anxious. The patient does not have insomnia.               Objective     /68 (BP Location: Right arm, Patient Position: Sitting, BP Cuff Size: Adult)   Pulse (!) 110   Temp 37.1 °C (98.8 °F) (Temporal)   Resp 14   Ht 1.61 m (5' 3.39\")   Wt 68.7 kg (151 lb 8 oz)   SpO2 98%   BMI 26.51 kg/m²      Physical Exam                Assessment & Plan     No diagnosis found.          PET after C6  Confirmed 6 cycles with dr almeida - good try though    " which is self-limiting.  She does get little dizzy if she gets up too quickly the week of chemo, which is self-limiting.  Baseline anxiety is stable.  She is otherwise at her baseline and asymptomatic, B symptoms have resolved.      Review of Systems   Constitutional:  Negative for chills, diaphoresis, fever, malaise/fatigue (more activity this past week, a few days after (2-4)) and weight loss (stable).   Respiratory:  Positive for shortness of breath (with speech). Negative for cough and wheezing.    Cardiovascular:  Negative for chest pain, palpitations and leg swelling.   Gastrointestinal:  Negative for blood in stool, constipation (Last BM today), diarrhea, melena, nausea (zofran PRN; better with snacks) and vomiting.   Genitourinary:  Negative for dysuria and hematuria.   Musculoskeletal:  Negative for joint pain and myalgias. Falls: maybe a little.  Neurological:  Positive for dizziness (if up too quick, week of chemo). Negative for tingling and headaches.   Psychiatric/Behavioral:  The patient is nervous/anxious. The patient does not have insomnia.      Allergies   Allergen Reactions    Codeine      RASH    Other Misc      Adhesives- rash         Current Outpatient Medications on File Prior to Encounter   Medication Sig Dispense Refill    lidocaine-prilocaine (EMLA) 2.5-2.5 % Cream Apply to port 1 hour prior to access of port and cover with plastic wrap. 1 g 3    ondansetron (ZOFRAN) 4 MG Tab tablet Take 1 Tablet by mouth every four hours as needed for Nausea/Vomiting (for nausea, vomiting). 30 Tablet 6    Levonorgestrel (MIRENA, 52 MG, IU) by Intrauterine route. MG unknown      prochlorperazine (COMPAZINE) 10 MG Tab Take 1 Tablet by mouth every 6 hours as needed (for nausea, vomiting). (Patient not taking: Reported on 3/21/2025) 30 Tablet 6    OLANZapine (ZYPREXA) 5 MG Tab Take 1 Tablet by mouth every evening. (Patient not taking: Reported on 3/21/2025) 30 Tablet 6     No current facility-administered  "medications on file prior to encounter.              Objective     /68 (BP Location: Right arm, Patient Position: Sitting, BP Cuff Size: Adult)   Pulse (!) 110   Temp 37.1 °C (98.8 °F) (Temporal)   Resp 14   Ht 1.61 m (5' 3.39\")   Wt 68.7 kg (151 lb 8 oz)   SpO2 98%   BMI 26.51 kg/m²      Physical Exam  Vitals reviewed.   Constitutional:       General: She is not in acute distress.     Appearance: She is well-developed. She is not diaphoretic.   HENT:      Head: Normocephalic and atraumatic.      Mouth/Throat:      Pharynx: No oropharyngeal exudate.   Eyes:      General: No scleral icterus.        Right eye: No discharge.         Left eye: No discharge.      Conjunctiva/sclera: Conjunctivae normal.      Pupils: Pupils are equal, round, and reactive to light.   Neck:      Thyroid: No thyromegaly.   Cardiovascular:      Rate and Rhythm: Normal rate and regular rhythm.      Heart sounds: Normal heart sounds. No murmur heard.     No friction rub. No gallop.   Pulmonary:      Effort: Pulmonary effort is normal. No respiratory distress.      Breath sounds: Normal breath sounds. No wheezing.   Abdominal:      General: Bowel sounds are normal. There is no distension.      Palpations: Abdomen is soft.      Tenderness: There is no abdominal tenderness.   Musculoskeletal:         General: No tenderness. Normal range of motion.      Cervical back: Normal range of motion and neck supple.   Lymphadenopathy:      Head:      Right side of head: No submental, submandibular, tonsillar, preauricular, posterior auricular or occipital adenopathy.      Left side of head: No submental, submandibular, tonsillar, preauricular, posterior auricular or occipital adenopathy.      Cervical: No cervical adenopathy.      Right cervical: No superficial or deep cervical adenopathy.     Left cervical: No superficial or deep cervical adenopathy.      Upper Body:      Right upper body: No supraclavicular, axillary or epitrochlear " adenopathy.      Left upper body: No supraclavicular, axillary or epitrochlear adenopathy.   Skin:     General: Skin is warm and dry.      Coloration: Skin is not pale.      Findings: No erythema or rash.   Neurological:      Mental Status: She is alert and oriented to person, place, and time.   Psychiatric:         Behavior: Behavior normal.       Hospital Outpatient Visit on 03/21/2025   Component Date Value Ref Range Status    WBC 03/21/2025 4.1 (L)  4.8 - 10.8 K/uL Final    RBC 03/21/2025 4.92  4.20 - 5.40 M/uL Final    Hemoglobin 03/21/2025 13.8  12.0 - 16.0 g/dL Final    Hematocrit 03/21/2025 41.0  37.0 - 47.0 % Final    MCV 03/21/2025 83.3  81.4 - 97.8 fL Final    MCH 03/21/2025 28.0  27.0 - 33.0 pg Final    MCHC 03/21/2025 33.7  32.2 - 35.5 g/dL Final    RDW 03/21/2025 46.4  35.9 - 50.0 fL Final    Platelet Count 03/21/2025 321  164 - 446 K/uL Final    MPV 03/21/2025 9.4  9.0 - 12.9 fL Final    Neutrophils-Polys 03/21/2025 60.30  44.00 - 72.00 % Final    Lymphocytes 03/21/2025 24.50  22.00 - 41.00 % Final    Monocytes 03/21/2025 13.30  0.00 - 13.40 % Final    Eosinophils 03/21/2025 1.20  0.00 - 6.90 % Final    Basophils 03/21/2025 0.50  0.00 - 1.80 % Final    Immature Granulocytes 03/21/2025 0.20  0.00 - 0.90 % Final    Nucleated RBC 03/21/2025 0.00  0.00 - 0.20 /100 WBC Final    Neutrophils (Absolute) 03/21/2025 2.48  1.82 - 7.42 K/uL Final    Includes immature neutrophils, if present.    Lymphs (Absolute) 03/21/2025 1.01  1.00 - 4.80 K/uL Final    Monos (Absolute) 03/21/2025 0.55  0.00 - 0.85 K/uL Final    Eos (Absolute) 03/21/2025 0.05  0.00 - 0.51 K/uL Final    Baso (Absolute) 03/21/2025 0.02  0.00 - 0.12 K/uL Final    Immature Granulocytes (abs) 03/21/2025 0.01  0.00 - 0.11 K/uL Final    NRBC (Absolute) 03/21/2025 0.00  K/uL Final    Sodium 03/21/2025 141  135 - 145 mmol/L Final    Potassium 03/21/2025 4.3  3.6 - 5.5 mmol/L Final    Chloride 03/21/2025 107  96 - 112 mmol/L Final    Co2 03/21/2025 21  20  - 33 mmol/L Final    Anion Gap 03/21/2025 13.0  7.0 - 16.0 Final    Glucose 03/21/2025 87  65 - 99 mg/dL Final    Bun 03/21/2025 9  8 - 22 mg/dL Final    Creatinine 03/21/2025 0.71  0.50 - 1.40 mg/dL Final    Calcium 03/21/2025 9.6  8.5 - 10.5 mg/dL Final    Correct Calcium 03/21/2025 9.2  8.5 - 10.5 mg/dL Final    AST(SGOT) 03/21/2025 281 (H)  12 - 45 U/L Final    ALT(SGPT) 03/21/2025 103 (H)  2 - 50 U/L Final    Alkaline Phosphatase 03/21/2025 67  30 - 99 U/L Final    Total Bilirubin 03/21/2025 0.3  0.1 - 1.5 mg/dL Final    Albumin 03/21/2025 4.5  3.2 - 4.9 g/dL Final    Total Protein 03/21/2025 7.4  6.0 - 8.2 g/dL Final    Globulin 03/21/2025 2.9  1.9 - 3.5 g/dL Final    A-G Ratio 03/21/2025 1.6  g/dL Final    LDH Total 03/21/2025 401 (H)  107 - 266 U/L Final    Uric Acid 03/21/2025 4.7  1.9 - 8.2 mg/dL Final    GFR (CKD-EPI) 03/21/2025 121  >60 mL/min/1.73 m 2 Final    Comment: Estimated Glomerular Filtration Rate is calculated using  race neutral CKD-EPI 2021 equation per NKF-ASN recommendations.              Assessment & Plan     1. 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 Encounter for long-term current use of high risk medication        3. Elevated LDH        4. Transaminitis             1.  Elevated liver enzymes/LDH: New onset, patient denies RUQ discomfort, no jaundice noted, no B symptoms, continue to monitor.    2.  Hodgkin's lymphoma: Diagnosed 11/12/2022; initiated ABVD 12/27/2024, Warren schneider'evonne C3    Patient continues tolerate treatment very well, PET scans show complete response to therapy.  CBC, CMP, LDH, uric acid have been evaluated and found to be within acceptable limits, except were addressed above.  She will proceed with cycle 4 of treatment and return in 4 weeks for evaluation prior to cycle 5, sooner as needed.    - Confirmed with Dr. Silver 6 cycles, given staging  - PET after C6 (ordered today)      The patient verbalized  agreement and understanding of current plan. All questions and concerns were addressed at time of visit.    Please note that this dictation was created using voice recognition software. I have made every reasonable attempt to correct obvious errors, but I expect that there are errors of grammar and possibly content that I did not discover before finalizing the note.

## 2025-03-21 NOTE — PROGRESS NOTES
"Pharmacy Chemotherapy Calculation:    Dx: Hodgkin's Lymphoma stage IIB       Protocol: AVD (DOXOrubicin/VinBLAStine/Dacarbazine)  *Dosing Reference*  DOXOrubicin 25 mg/m² IV push on Days 1 and 15   3/22/25 Dose reduced by 50% (12.5 mg/m2) for today C4D1 for elevated AST/ALT per Dr. Silver.  VinBLAStine 6 mg/m² IV over 5 - 10 minutes on Days 1 and 15   3/22/25 Dose reduced by 50% (3 mg/m2) for today C4D1 for elevated AST/ALT per Dr. Silver.  Dacarbazine 375 mg/m² IV over 30 minutes on Days 1 and 15  28-day cycle for 4 cycles conditionally following 2 cycles of ABVD or 2 cycles of BEACOPP based on Deauville score  NCCN Guidelines® for Hodgkin Lymphoma V.3.2024.   Clarence P, et al. N Engl J Med. 2016;374(25):2419-29.a   - 2/22/25: therapy changed from ABVD to AVD and to be continued x4 more cycles   Allergies:  Codeine and Other misc     /76   Pulse 75   Temp 36.2 °C (97.1 °F) (Temporal)   Resp 17   Ht 1.61 m (5' 3.39\")   Wt 71.6 kg (157 lb 14.7 oz)   SpO2 99%   BMI 27.63 kg/m²   Body surface area is 1.79 meters squared.    12/17/24 PULMONARY FUNCTION TEST INTERPRETATION    IMPRESSION:    1.  There is no obstruction.  2.  There is no significant bronchodilator response.  3.  RV is elevated to 136% of unclear significance.  4.  DLCO is normal.  5.  Flow volume loop appears normal.    Normal PFTs.      11/21/24 15:15   Left Ventrical Ejection Fraction 65     Labs 3/21/25:  ANC~ 2480 Plt = 321 k   Hgb = 13.8     SCr = 0.71 mg/dL CrCl > 125 mL/min   AST/ALT/AP = 281/103/67 TBili = 0.3   K+ = 4.3  Uric Acid = 4.7   LDH = 401  *Dose reduction of DOXOrubicin and VinBLASTine noted above and below for today per Dr. Silver    Drug Order   (Drug name, dose, route, IV Fluid & volume, frequency, number of doses) Cycle 4, Day 1  Previous treatment: C3D15 on 3/8/25     Medication = DOXOrubicin  Base Dose = 12.5 mg/m2  Calc Dose: Base Dose x 1.79 m2 = 22.3 mg  Final Dose = 20 mg  Route = IV  Fluid & Volume = 10 mL empty " bag  Admin Duration = Over 20 minutes          <10% difference, OK to treat with final dose   Medication = VinBLAStine  Base Dose = 3 mg/m2  Calc Dose:Base Dose x 1.79 m2 = 5.37 mg  Final Dose = 5.3 mg  Route = IV  Fluid & Volume = NS 25 mL  Admin Duration = Over 5-10 minutes          <10% difference, OK to treat with final dose   Medication = Dacarbazine  Base Dose = 375 mg/m2  Calc Dose: Base Dose x 1.79 m2 = 671.3 mg  Final Dose = 656.3 mg  Route = IV  Fluid & Volume =  mL  Admin Duration = Over 30 minutes          <10% difference, OK to treat with final dose     By my signature below, I confirm this process was performed independently with the BSA and all final chemotherapy dosing calculations congruent. I have reviewed the above chemotherapy order and that my calculation of the final dose and BSA (when applicable) corroborate those calculations of the  pharmacist. Discrepancies of 10% or greater in the written dose have been addressed and documented within the EPIC Progress notes.    Samreen Moser, PharmD

## 2025-03-22 ENCOUNTER — OUTPATIENT INFUSION SERVICES (OUTPATIENT)
Dept: ONCOLOGY | Facility: MEDICAL CENTER | Age: 25
End: 2025-03-22
Attending: STUDENT IN AN ORGANIZED HEALTH CARE EDUCATION/TRAINING PROGRAM
Payer: MEDICAID

## 2025-03-22 VITALS
SYSTOLIC BLOOD PRESSURE: 116 MMHG | WEIGHT: 157.92 LBS | TEMPERATURE: 97.1 F | HEART RATE: 75 BPM | OXYGEN SATURATION: 99 % | HEIGHT: 63 IN | RESPIRATION RATE: 17 BRPM | DIASTOLIC BLOOD PRESSURE: 76 MMHG | BODY MASS INDEX: 27.98 KG/M2

## 2025-03-22 DIAGNOSIS — C81.72 OTHER CLASSICAL HODGKIN LYMPHOMA OF INTRATHORACIC LYMPH NODES (HCC): ICD-10-CM

## 2025-03-22 PROCEDURE — A4212 NON CORING NEEDLE OR STYLET: HCPCS

## 2025-03-22 PROCEDURE — 96413 CHEMO IV INFUSION 1 HR: CPT

## 2025-03-22 PROCEDURE — A9270 NON-COVERED ITEM OR SERVICE: HCPCS | Mod: UD | Performed by: NURSE PRACTITIONER

## 2025-03-22 PROCEDURE — 96375 TX/PRO/DX INJ NEW DRUG ADDON: CPT

## 2025-03-22 PROCEDURE — 96417 CHEMO IV INFUS EACH ADDL SEQ: CPT

## 2025-03-22 PROCEDURE — 700111 HCHG RX REV CODE 636 W/ 250 OVERRIDE (IP): Mod: UD | Performed by: NURSE PRACTITIONER

## 2025-03-22 PROCEDURE — 700105 HCHG RX REV CODE 258: Mod: UD | Performed by: NURSE PRACTITIONER

## 2025-03-22 PROCEDURE — 700102 HCHG RX REV CODE 250 W/ 637 OVERRIDE(OP): Mod: UD | Performed by: NURSE PRACTITIONER

## 2025-03-22 PROCEDURE — 96411 CHEMO IV PUSH ADDL DRUG: CPT

## 2025-03-22 PROCEDURE — 96367 TX/PROPH/DG ADDL SEQ IV INF: CPT

## 2025-03-22 RX ORDER — DEXAMETHASONE SODIUM PHOSPHATE 4 MG/ML
12 INJECTION, SOLUTION INTRA-ARTICULAR; INTRALESIONAL; INTRAMUSCULAR; INTRAVENOUS; SOFT TISSUE ONCE
Status: COMPLETED | OUTPATIENT
Start: 2025-03-22 | End: 2025-03-22

## 2025-03-22 RX ORDER — SODIUM CHLORIDE 9 MG/ML
INJECTION, SOLUTION INTRAVENOUS CONTINUOUS
Status: DISCONTINUED | OUTPATIENT
Start: 2025-03-22 | End: 2025-03-22 | Stop reason: HOSPADM

## 2025-03-22 RX ORDER — DIPHENHYDRAMINE HYDROCHLORIDE 50 MG/ML
50 INJECTION, SOLUTION INTRAMUSCULAR; INTRAVENOUS PRN
Status: DISCONTINUED | OUTPATIENT
Start: 2025-03-22 | End: 2025-03-22 | Stop reason: HOSPADM

## 2025-03-22 RX ORDER — ACETAMINOPHEN 325 MG/1
650 TABLET ORAL ONCE
Status: COMPLETED | OUTPATIENT
Start: 2025-03-22 | End: 2025-03-22

## 2025-03-22 RX ORDER — EPINEPHRINE 1 MG/ML(1)
0.5 AMPUL (ML) INJECTION PRN
Status: DISCONTINUED | OUTPATIENT
Start: 2025-03-22 | End: 2025-03-22 | Stop reason: HOSPADM

## 2025-03-22 RX ORDER — DIPHENHYDRAMINE HYDROCHLORIDE 50 MG/ML
25 INJECTION, SOLUTION INTRAMUSCULAR; INTRAVENOUS ONCE
Status: COMPLETED | OUTPATIENT
Start: 2025-03-22 | End: 2025-03-22

## 2025-03-22 RX ORDER — PALONOSETRON 0.05 MG/ML
0.25 INJECTION, SOLUTION INTRAVENOUS ONCE
Status: COMPLETED | OUTPATIENT
Start: 2025-03-22 | End: 2025-03-22

## 2025-03-22 RX ORDER — METHYLPREDNISOLONE SODIUM SUCCINATE 125 MG/2ML
125 INJECTION, POWDER, LYOPHILIZED, FOR SOLUTION INTRAMUSCULAR; INTRAVENOUS PRN
Status: DISCONTINUED | OUTPATIENT
Start: 2025-03-22 | End: 2025-03-22 | Stop reason: HOSPADM

## 2025-03-22 RX ADMIN — DEXAMETHASONE SODIUM PHOSPHATE 12 MG: 4 INJECTION INTRA-ARTICULAR; INTRALESIONAL; INTRAMUSCULAR; INTRAVENOUS; SOFT TISSUE at 08:08

## 2025-03-22 RX ADMIN — SODIUM CHLORIDE: 9 INJECTION, SOLUTION INTRAVENOUS at 08:16

## 2025-03-22 RX ADMIN — PALONOSETRON HYDROCHLORIDE 0.25 MG: 0.25 INJECTION INTRAVENOUS at 08:08

## 2025-03-22 RX ADMIN — SODIUM CHLORIDE 656.3 MG: 9 INJECTION, SOLUTION INTRAVENOUS at 10:53

## 2025-03-22 RX ADMIN — ACETAMINOPHEN 650 MG: 325 TABLET ORAL at 08:08

## 2025-03-22 RX ADMIN — DOXORUBICIN HYDROCHLORIDE 20 MG: 2 INJECTION, SOLUTION INTRAVENOUS at 09:38

## 2025-03-22 RX ADMIN — FOSAPREPITANT 150 MG: 150 INJECTION, POWDER, LYOPHILIZED, FOR SOLUTION INTRAVENOUS at 08:40

## 2025-03-22 RX ADMIN — VINBLASTINE SULFATE 5.3 MG: 1 INJECTION INTRAVENOUS at 10:40

## 2025-03-22 RX ADMIN — DIPHENHYDRAMINE HYDROCHLORIDE 25 MG: 50 INJECTION INTRAMUSCULAR; INTRAVENOUS at 08:29

## 2025-03-22 ASSESSMENT — FIBROSIS 4 INDEX: FIB4 SCORE: 2.16

## 2025-03-22 NOTE — PROGRESS NOTES
Albert presents to infusion for cycle 4/ day 1 of AVD (bleomycin removed after cycle 2) for Hodgkin lymphoma. Patient reports feeling well today with no major complaints, tolerated last cycle well aside from some occasional nausea and palpitations, provder aware.     Port accessed using sterile technique, flushed with NS with positive blood return.    Labs from 3/21 reviewed by RN, AST: 281 and ALT: 103 as well as LDH: 401 reported to Dr. Silver, received okay to treat today, doxorubicin and vinblastine to be dose-reduced by 50%.    Pre-treatment meds given as ordered.     Doxorubicin given over 20 minutes.  Vinblastine given over 10 minutes.  Dacarbazine given over 30 minutes.   Patient tolerated all well with no adverse effects.     Port flushed post infusion with positive blood return and deaccessed per policy, site covered with band aid. Patient left in stable condition, knows when to return for next appt.

## 2025-03-22 NOTE — PROGRESS NOTES
"Pharmacy Chemotherapy calculation    DX: Hodgkin Lymphoma    Cycle 4, Day 1  Previous treatment = C3D15 on 3/8/25    Regimen:  --> de-escalate to AVD   *Dosing Reference*  Doxorubicin 25 mg/m2 IV push on Days 1 and 15   - 3/22/25 C4D1: reduce dose 50% for elevated LFTs today per Dr Silver   - 2/22/25: removed from treatment plan per MD  Vinblastine 6 mg/m2 IV over 5-10 minutes on Days 1 and 15   - 3/22/25 C4D1: reduce dose 50% for elevated LFTs today per Dr Silver  Dacarbazine 375 mg/m2 IV over 30 minutes on Days 1 and 15  Primary treatment: 28-day cycle for 2 cycles conditionally followed by   Additional therapy: Based on Deauville score: 2 cycles combined modality   - 2/22/25: therapy changed to AVD and to be continued x4 more cycles  NCCN Guidelines for Hodgkin Lymphoma V.3.2024.  Demetria AUGUSTE, et al. N Engl J Med. 2010;363(7):640-52.  Abhijit WASSERMAN et al. N Engl J Med. 2015;372(17):1598-607.    Allergies: Codeine and Other misc   /76   Pulse 75   Temp 36.2 °C (97.1 °F) (Temporal)   Resp 17   Ht 1.61 m (5' 3.39\")   Wt 71.6 kg (157 lb 14.7 oz)   SpO2 99%   BMI 27.63 kg/m²   Body surface area is 1.79 meters squared.    Labs from 3/21/25 reviewed - ok to proceed with dose adjustments for AST 6x ULN and ALT 2x ULN per Dr Silver (bilirubin WNL)  11/21/24 ECHO LVEF 65%  12/17/24 PFT: DLCO is normal.      Doxorubicin 12.5 mg/m2 x 1.79 m2 = 22.375 mg   ~10% difference, okay to treat with final dose = 20 mg IV    Vinblastine 3 mg/m2 x 1.79 m2 = 5.37 mg   <10% difference, okay to treat with final dose = 5.3 mg IV    Dacarbazine 375 mg/m2 x 1.79 m2 = 671.25 mg   <10% difference, okay to treat with final dose = 656.3 mg IV      Irma Freedman, PharmD, BCOP  "

## 2025-03-22 NOTE — PROGRESS NOTES
Chemotherapy Verification - PRIMARY RN      Height = 1.61m  Weight = 71.6 kg  BSA = 1.79m2       Medication: doxorubicin  Dose: 12.5mg/m2  Calculated Dose: 22.38 mg                             (In mg/m2, AUC, mg/kg)     Medication: vinblastine  Dose: 3mg/m2  Calculated Dose: 5.37 mg                             (In mg/m2, AUC, mg/kg)    Medication: dacarbazine  Dose: 375mg/m2  Calculated Dose: 671.25 mg                             (In mg/m2, AUC, mg/kg)        I confirm this process was performed independently with the BSA and all final chemotherapy dosing calculations congruent.  Any discrepancies of 10% or greater have been addressed with the chemotherapy pharmacist. The resolution of the discrepancy has been documented in the EPIC progress notes.

## 2025-03-22 NOTE — PROGRESS NOTES
Chemotherapy Verification - SECONDARY RN   C4 D1   ECHO 11/21/24 with LVEF ~65%    Height = 161 cm  Weight = 71.63 kg  BSA =  1.79 m^2       Medication: doxorubicin (ADRIAMYCIN)  Dose: 12.5 mg/m2  Calculated Dose: 22.375 mg                             (In mg/m2, AUC, mg/kg)     Medication: vinblastine (VELBAN)  Dose: 3 mg/m2  Calculated Dose: 5.37 mg                             (In mg/m2, AUC, mg/kg)    Medication: dacarbazine (DTIC)  Dose: 375 mg/m2  Calculated Dose: 671.25 mg                             (In mg/m2, AUC, mg/kg)    I confirm that this process was performed independently.

## 2025-04-04 ENCOUNTER — HOSPITAL ENCOUNTER (OUTPATIENT)
Dept: LAB | Facility: MEDICAL CENTER | Age: 25
End: 2025-04-04
Attending: STUDENT IN AN ORGANIZED HEALTH CARE EDUCATION/TRAINING PROGRAM
Payer: COMMERCIAL

## 2025-04-04 DIAGNOSIS — Z79.899 ENCOUNTER FOR LONG-TERM (CURRENT) USE OF HIGH-RISK MEDICATION: ICD-10-CM

## 2025-04-04 DIAGNOSIS — C81.72 OTHER CLASSICAL HODGKIN LYMPHOMA OF INTRATHORACIC LYMPH NODES (HCC): ICD-10-CM

## 2025-04-04 LAB
ALBUMIN SERPL BCP-MCNC: 4.6 G/DL (ref 3.2–4.9)
ALBUMIN/GLOB SERPL: 1.8 G/DL
ALP SERPL-CCNC: 68 U/L (ref 30–99)
ALT SERPL-CCNC: 32 U/L (ref 2–50)
ANION GAP SERPL CALC-SCNC: 10 MMOL/L (ref 7–16)
AST SERPL-CCNC: 24 U/L (ref 12–45)
BASOPHILS # BLD AUTO: 0.6 % (ref 0–1.8)
BASOPHILS # BLD: 0.03 K/UL (ref 0–0.12)
BILIRUB SERPL-MCNC: 0.3 MG/DL (ref 0.1–1.5)
BUN SERPL-MCNC: 9 MG/DL (ref 8–22)
CALCIUM ALBUM COR SERPL-MCNC: 9.1 MG/DL (ref 8.5–10.5)
CALCIUM SERPL-MCNC: 9.6 MG/DL (ref 8.5–10.5)
CHLORIDE SERPL-SCNC: 105 MMOL/L (ref 96–112)
CO2 SERPL-SCNC: 22 MMOL/L (ref 20–33)
CREAT SERPL-MCNC: 0.69 MG/DL (ref 0.5–1.4)
EOSINOPHIL # BLD AUTO: 0.03 K/UL (ref 0–0.51)
EOSINOPHIL NFR BLD: 0.6 % (ref 0–6.9)
ERYTHROCYTE [DISTWIDTH] IN BLOOD BY AUTOMATED COUNT: 47.8 FL (ref 35.9–50)
GFR SERPLBLD CREATININE-BSD FMLA CKD-EPI: 123 ML/MIN/1.73 M 2
GLOBULIN SER CALC-MCNC: 2.5 G/DL (ref 1.9–3.5)
GLUCOSE SERPL-MCNC: 85 MG/DL (ref 65–99)
HCT VFR BLD AUTO: 42 % (ref 37–47)
HGB BLD-MCNC: 13.8 G/DL (ref 12–16)
IMM GRANULOCYTES # BLD AUTO: 0.01 K/UL (ref 0–0.11)
IMM GRANULOCYTES NFR BLD AUTO: 0.2 % (ref 0–0.9)
LYMPHOCYTES # BLD AUTO: 1.01 K/UL (ref 1–4.8)
LYMPHOCYTES NFR BLD: 19.2 % (ref 22–41)
MCH RBC QN AUTO: 28 PG (ref 27–33)
MCHC RBC AUTO-ENTMCNC: 32.9 G/DL (ref 32.2–35.5)
MCV RBC AUTO: 85.4 FL (ref 81.4–97.8)
MONOCYTES # BLD AUTO: 0.52 K/UL (ref 0–0.85)
MONOCYTES NFR BLD AUTO: 9.9 % (ref 0–13.4)
NEUTROPHILS # BLD AUTO: 3.66 K/UL (ref 1.82–7.42)
NEUTROPHILS NFR BLD: 69.5 % (ref 44–72)
NRBC # BLD AUTO: 0 K/UL
NRBC BLD-RTO: 0 /100 WBC (ref 0–0.2)
PLATELET # BLD AUTO: 322 K/UL (ref 164–446)
PMV BLD AUTO: 10 FL (ref 9–12.9)
POTASSIUM SERPL-SCNC: 4.5 MMOL/L (ref 3.6–5.5)
PROT SERPL-MCNC: 7.1 G/DL (ref 6–8.2)
RBC # BLD AUTO: 4.92 M/UL (ref 4.2–5.4)
SODIUM SERPL-SCNC: 137 MMOL/L (ref 135–145)
WBC # BLD AUTO: 5.3 K/UL (ref 4.8–10.8)

## 2025-04-04 PROCEDURE — 80053 COMPREHEN METABOLIC PANEL: CPT

## 2025-04-04 PROCEDURE — 36415 COLL VENOUS BLD VENIPUNCTURE: CPT

## 2025-04-04 PROCEDURE — 85025 COMPLETE CBC W/AUTO DIFF WBC: CPT

## 2025-04-04 NOTE — PROGRESS NOTES
"Pharmacy Chemotherapy Calculation:    Dx: Hodgkin's Lymphoma stage IIB       Protocol: AVD (DOXOrubicin/VinBLAStine/Dacarbazine)  *Dosing Reference*  DOXOrubicin 25 mg/m² IV push on Days 1 and 15   3/22/25 Dose reduced by 50% (12.5 mg/m2) for today C4D1 for elevated AST/ALT per Dr. Silver.  VinBLAStine 6 mg/m² IV over 5 - 10 minutes on Days 1 and 15   3/22/25 Dose reduced by 50% (3 mg/m2) for today C4D1 for elevated AST/ALT per Dr. Silver.  Dacarbazine 375 mg/m² IV over 30 minutes on Days 1 and 15  28-day cycle for 4 cycles conditionally following 2 cycles of ABVD or 2 cycles of BEACOPP based on Deauville score  NCCN Guidelines® for Hodgkin Lymphoma V.3.2024.   Clarence P, et al. N Engl J Med. 2016;374(25):2415-29.a   - 2/22/25: therapy changed from ABVD to AVD and to be continued x4 more cycles   Allergies:  Codeine and Other misc     /74   Pulse 91   Temp 36.5 °C (97.7 °F) (Temporal)   Resp 17   Ht 1.61 m (5' 3.39\")   Wt 71.2 kg (156 lb 15.5 oz)   SpO2 97%   BMI 27.47 kg/m²   Body surface area is 1.78 meters squared.    12/17/24 PULMONARY FUNCTION TEST INTERPRETATION    IMPRESSION:    1.  There is no obstruction.  2.  There is no significant bronchodilator response.  3.  RV is elevated to 136% of unclear significance.  4.  DLCO is normal.  5.  Flow volume loop appears normal.    Normal PFTs.      11/21/24 15:15   Left Ventrical Ejection Fraction 65     Labs 4/4/25:  ANC~ 3660 Plt = 322k   Hgb = 13.8     SCr = 0.99mg/dL CrCl ~ 97mL/min   AST/ALT/AP = WNL TBili = 0.3        Drug Order   (Drug name, dose, route, IV Fluid & volume, frequency, number of doses) Cycle 4, Day 15  Previous treatment: C4D1 on 3/22/25     Medication = DOXOrubicin  Base Dose = 25 mg/m2  Calc Dose: Base Dose x 1.78 m2 = 44.5 mg  Final Dose = 42 mg  Route = IV  Fluid & Volume = 10 mL empty bag  Admin Duration = Over 20 minutes          <10% difference, OK to treat with final dose   Medication = VinBLAStine  Base Dose = 6 mg/m2  Calc " Dose:Base Dose x 1.78 m2 = 10.68 mg  Final Dose = 10.5 mg  Route = IV  Fluid & Volume = NS 25 mL  Admin Duration = Over 5-10 minutes          <10% difference, OK to treat with final dose   Medication = Dacarbazine  Base Dose = 375 mg/m2  Calc Dose: Base Dose x 1.78 m2 = 667.5 mg  Final Dose = 656.3 mg  Route = IV  Fluid & Volume =  mL  Admin Duration = Over 30 minutes          <10% difference, OK to treat with final dose     By my signature below, I confirm this process was performed independently with the BSA and all final chemotherapy dosing calculations congruent. I have reviewed the above chemotherapy order and that my calculation of the final dose and BSA (when applicable) corroborate those calculations of the  pharmacist. Discrepancies of 10% or greater in the written dose have been addressed and documented within the EPIC Progress notes.    Lisa Martin, PharmD

## 2025-04-05 ENCOUNTER — OUTPATIENT INFUSION SERVICES (OUTPATIENT)
Dept: ONCOLOGY | Facility: MEDICAL CENTER | Age: 25
End: 2025-04-05
Attending: STUDENT IN AN ORGANIZED HEALTH CARE EDUCATION/TRAINING PROGRAM
Payer: MEDICAID

## 2025-04-05 VITALS
DIASTOLIC BLOOD PRESSURE: 74 MMHG | WEIGHT: 156.97 LBS | BODY MASS INDEX: 27.81 KG/M2 | SYSTOLIC BLOOD PRESSURE: 119 MMHG | HEART RATE: 91 BPM | TEMPERATURE: 97.7 F | RESPIRATION RATE: 17 BRPM | HEIGHT: 63 IN | OXYGEN SATURATION: 97 %

## 2025-04-05 DIAGNOSIS — C81.72 OTHER CLASSICAL HODGKIN LYMPHOMA OF INTRATHORACIC LYMPH NODES (HCC): ICD-10-CM

## 2025-04-05 PROCEDURE — 96411 CHEMO IV PUSH ADDL DRUG: CPT

## 2025-04-05 PROCEDURE — 700105 HCHG RX REV CODE 258: Mod: UD | Performed by: NURSE PRACTITIONER

## 2025-04-05 PROCEDURE — A4212 NON CORING NEEDLE OR STYLET: HCPCS

## 2025-04-05 PROCEDURE — 96367 TX/PROPH/DG ADDL SEQ IV INF: CPT

## 2025-04-05 PROCEDURE — 700102 HCHG RX REV CODE 250 W/ 637 OVERRIDE(OP): Mod: UD | Performed by: NURSE PRACTITIONER

## 2025-04-05 PROCEDURE — A9270 NON-COVERED ITEM OR SERVICE: HCPCS | Mod: UD | Performed by: NURSE PRACTITIONER

## 2025-04-05 PROCEDURE — 96413 CHEMO IV INFUSION 1 HR: CPT

## 2025-04-05 PROCEDURE — 700111 HCHG RX REV CODE 636 W/ 250 OVERRIDE (IP): Mod: UD | Performed by: NURSE PRACTITIONER

## 2025-04-05 PROCEDURE — 96417 CHEMO IV INFUS EACH ADDL SEQ: CPT

## 2025-04-05 PROCEDURE — 96375 TX/PRO/DX INJ NEW DRUG ADDON: CPT

## 2025-04-05 RX ORDER — EPINEPHRINE 1 MG/ML(1)
0.5 AMPUL (ML) INJECTION PRN
Status: DISCONTINUED | OUTPATIENT
Start: 2025-04-05 | End: 2025-04-05 | Stop reason: HOSPADM

## 2025-04-05 RX ORDER — DIPHENHYDRAMINE HYDROCHLORIDE 50 MG/ML
50 INJECTION, SOLUTION INTRAMUSCULAR; INTRAVENOUS PRN
Status: DISCONTINUED | OUTPATIENT
Start: 2025-04-05 | End: 2025-04-05 | Stop reason: HOSPADM

## 2025-04-05 RX ORDER — ONDANSETRON 8 MG/1
8 TABLET, ORALLY DISINTEGRATING ORAL PRN
Status: DISCONTINUED | OUTPATIENT
Start: 2025-04-05 | End: 2025-04-05 | Stop reason: HOSPADM

## 2025-04-05 RX ORDER — DIPHENHYDRAMINE HYDROCHLORIDE 50 MG/ML
25 INJECTION, SOLUTION INTRAMUSCULAR; INTRAVENOUS ONCE
Status: COMPLETED | OUTPATIENT
Start: 2025-04-05 | End: 2025-04-05

## 2025-04-05 RX ORDER — DEXAMETHASONE SODIUM PHOSPHATE 4 MG/ML
12 INJECTION, SOLUTION INTRA-ARTICULAR; INTRALESIONAL; INTRAMUSCULAR; INTRAVENOUS; SOFT TISSUE ONCE
Status: COMPLETED | OUTPATIENT
Start: 2025-04-05 | End: 2025-04-05

## 2025-04-05 RX ORDER — ACETAMINOPHEN 325 MG/1
650 TABLET ORAL ONCE
Status: COMPLETED | OUTPATIENT
Start: 2025-04-05 | End: 2025-04-05

## 2025-04-05 RX ORDER — METHYLPREDNISOLONE SODIUM SUCCINATE 125 MG/2ML
125 INJECTION, POWDER, LYOPHILIZED, FOR SOLUTION INTRAMUSCULAR; INTRAVENOUS PRN
Status: DISCONTINUED | OUTPATIENT
Start: 2025-04-05 | End: 2025-04-05 | Stop reason: HOSPADM

## 2025-04-05 RX ORDER — PROCHLORPERAZINE MALEATE 10 MG
10 TABLET ORAL EVERY 6 HOURS PRN
Status: DISCONTINUED | OUTPATIENT
Start: 2025-04-05 | End: 2025-04-05 | Stop reason: HOSPADM

## 2025-04-05 RX ORDER — PALONOSETRON 0.05 MG/ML
0.25 INJECTION, SOLUTION INTRAVENOUS ONCE
Status: COMPLETED | OUTPATIENT
Start: 2025-04-05 | End: 2025-04-05

## 2025-04-05 RX ORDER — ONDANSETRON 2 MG/ML
4 INJECTION INTRAMUSCULAR; INTRAVENOUS PRN
Status: DISCONTINUED | OUTPATIENT
Start: 2025-04-05 | End: 2025-04-05 | Stop reason: HOSPADM

## 2025-04-05 RX ADMIN — PALONOSETRON HYDROCHLORIDE 0.25 MG: 0.25 INJECTION INTRAVENOUS at 08:07

## 2025-04-05 RX ADMIN — FOSAPREPITANT 150 MG: 150 INJECTION, POWDER, LYOPHILIZED, FOR SOLUTION INTRAVENOUS at 08:14

## 2025-04-05 RX ADMIN — DEXAMETHASONE SODIUM PHOSPHATE 12 MG: 4 INJECTION INTRA-ARTICULAR; INTRALESIONAL; INTRAMUSCULAR; INTRAVENOUS; SOFT TISSUE at 08:00

## 2025-04-05 RX ADMIN — DOXORUBICIN HYDROCHLORIDE 42 MG: 2 INJECTION, SOLUTION INTRAVENOUS at 08:59

## 2025-04-05 RX ADMIN — DIPHENHYDRAMINE HYDROCHLORIDE 25 MG: 50 INJECTION INTRAMUSCULAR; INTRAVENOUS at 07:59

## 2025-04-05 RX ADMIN — ACETAMINOPHEN 650 MG: 325 TABLET ORAL at 07:58

## 2025-04-05 RX ADMIN — SODIUM CHLORIDE 656.3 MG: 9 INJECTION, SOLUTION INTRAVENOUS at 10:05

## 2025-04-05 RX ADMIN — VINBLASTINE SULFATE 10.5 MG: 1 INJECTION INTRAVENOUS at 09:46

## 2025-04-05 ASSESSMENT — FIBROSIS 4 INDEX: FIB4 SCORE: 0.33

## 2025-04-05 NOTE — PROGRESS NOTES
"Pharmacy Chemotherapy calculation    DX: Hodgkin Lymphoma    Cycle 4, Day 15  Previous treatment = C4D1 on 3/22/25    Regimen:  --> de-escalate to AVD   *Dosing Reference*  Doxorubicin 25 mg/m2 IV push on Days 1 and 15   - 3/22/25 C4D1: reduce dose 50% for elevated LFTs today per Dr Silver   - 2/22/25: removed from treatment plan per MD  Vinblastine 6 mg/m2 IV over 5-10 minutes on Days 1 and 15   - 3/22/25 C4D1: reduce dose 50% for elevated LFTs today per Dr Silver  Dacarbazine 375 mg/m2 IV over 30 minutes on Days 1 and 15  Primary treatment: 28-day cycle for 2 cycles conditionally followed by   Additional therapy: Based on Deauville score: 2 cycles combined modality   - 2/22/25: therapy changed to AVD and to be continued x4 more cycles  NCCN Guidelines for Hodgkin Lymphoma V.3.2024.  Demetria AUGUSTE, et al. N Engl J Med. 2010;363(7):640-52.  Abhijit WASSERMAN et al. N Engl J Med. 2015;372(17):1598-607.    Allergies: Codeine and Other misc   /74   Pulse 91   Temp 36.5 °C (97.7 °F) (Temporal)   Resp 17   Ht 1.61 m (5' 3.39\")   Wt 71.2 kg (156 lb 15.5 oz)   SpO2 97%   BMI 27.47 kg/m²   Body surface area is 1.78 meters squared.    Labs 4/4/25  ANC~ 3660 Plt = 322k   Hgb = 13.8     SCr = 0.69 mg/dL CrCl ~ >125 mL/min (minimum SCr of 0.7)   LFT's = WNLs  TBili = 0.3     11/21/24 ECHO LVEF 65%  12/17/24 PFT: DLCO is normal.    Doxorubicin 25 mg/m2 x 1.78 m2 = 44.5 mg   <10% difference, okay to treat with final dose = 42 mg IV    Vinblastine 6 mg/m2 x 1.78 m2 = 10.68 mg   <10% difference, okay to treat with final dose = 10.5 mg IV    Dacarbazine 375 mg/m2 x 1.78 m2 = 667.5 mg   <10% difference, okay to treat with final dose = 656.3 mg IV    Chirag Bazan, PharmD  "

## 2025-04-05 NOTE — PROGRESS NOTES
Albert presented to [facility] for Day 15/ Cycle 4 of Doxorubicin, Vinblastine and Dacarbazine for Hodgkin lymphoma. Pt denied having any new or acute complaints today, reported tolerating past treatments well. EMLA was applied over port site prior to arrival. Port accessed in a sterile manner, had positive blood return and flushed briskly. Labs were drawn 04/04/2025 and reviewed. Pt meets parameters for treatment today. Premeds given as ordered. Pt given Doxorubicin, Vinblastine and Dacarbazine as prescribed, tolerated well, denied having any complaints during or after infusion. Port had positive blood return after, saline flushed per [facility] policy, de-accessed, needle intact, insertion site covered with sterile gauze and paper tape. Pt has future appointments. Pt discharged to home in good condition.

## 2025-04-05 NOTE — PROGRESS NOTES
Chemotherapy Verification - PRIMARY RN      Height = 161 cm  Weight = 71.2 kg  BSA = 1.78 m^2       Medication: doxorubicin (Adriamycin)  Dose: 25 mg/m^2  Calculated Dose: 44.5 mg                             (In mg/m2, AUC, mg/kg)     Medication: vinblastine (Velban)  Dose: 6 mg/m^2  Calculated Dose: 10.68 m^2                             (In mg/m2, AUC, mg/kg)    Medication: dacarbazine (Dtic)  Dose: 375 mg/m^2  Calculated Dose: 667.5 mg                             (In mg/m2, AUC, mg/kg)      I confirm this process was performed independently with the BSA and all final chemotherapy dosing calculations congruent.  Any discrepancies of 10% or greater have been addressed with the chemotherapy pharmacist. The resolution of the discrepancy has been documented in the EPIC progress notes.

## 2025-04-05 NOTE — PROGRESS NOTES
Chemotherapy Verification - SECONDARY RN       Height = 1.61m  Weight = 71.2kg  BSA = 1.78m2       Medication: doxorubicin  Dose: 25mg/m2  Calculated Dose: 44.5mg                             (In mg/m2, AUC, mg/kg)     Medication: vinblastine   Dose: 6mg/m2  Calculated Dose: 10.68mg                             (In mg/m2, AUC, mg/kg)    Medication: dacarbazine  Dose: 375mg/m2  Calculated Dose: 667.5mg                             (In mg/m2, AUC, mg/kg)        I confirm that this process was performed independently.

## 2025-04-06 PROBLEM — F33.9 MAJOR DEPRESSIVE DISORDER, RECURRENT EPISODE (HCC): Chronic | Status: ACTIVE | Noted: 2020-01-16

## 2025-04-11 RX ORDER — DIPHENHYDRAMINE HYDROCHLORIDE 50 MG/ML
50 INJECTION, SOLUTION INTRAMUSCULAR; INTRAVENOUS PRN
OUTPATIENT
Start: 2025-05-03

## 2025-04-11 RX ORDER — ONDANSETRON 2 MG/ML
4 INJECTION INTRAMUSCULAR; INTRAVENOUS PRN
Status: CANCELLED | OUTPATIENT
Start: 2025-04-19

## 2025-04-11 RX ORDER — ACETAMINOPHEN 325 MG/1
650 TABLET ORAL ONCE
OUTPATIENT
Start: 2025-05-03 | End: 2025-05-03

## 2025-04-11 RX ORDER — SODIUM CHLORIDE 9 MG/ML
INJECTION, SOLUTION INTRAVENOUS CONTINUOUS
Status: CANCELLED | OUTPATIENT
Start: 2025-04-19

## 2025-04-11 RX ORDER — PALONOSETRON 0.05 MG/ML
0.25 INJECTION, SOLUTION INTRAVENOUS ONCE
Status: CANCELLED | OUTPATIENT
Start: 2025-04-19 | End: 2025-04-19

## 2025-04-11 RX ORDER — ACETAMINOPHEN 325 MG/1
650 TABLET ORAL ONCE
Status: CANCELLED | OUTPATIENT
Start: 2025-04-19 | End: 2025-04-19

## 2025-04-11 RX ORDER — PALONOSETRON 0.05 MG/ML
0.25 INJECTION, SOLUTION INTRAVENOUS ONCE
OUTPATIENT
Start: 2025-05-03 | End: 2025-05-03

## 2025-04-11 RX ORDER — 0.9 % SODIUM CHLORIDE 0.9 %
VIAL (ML) INJECTION PRN
Status: CANCELLED | OUTPATIENT
Start: 2025-04-18

## 2025-04-11 RX ORDER — PROCHLORPERAZINE MALEATE 10 MG
10 TABLET ORAL EVERY 6 HOURS PRN
Status: CANCELLED | OUTPATIENT
Start: 2025-04-19

## 2025-04-11 RX ORDER — ONDANSETRON 2 MG/ML
4 INJECTION INTRAMUSCULAR; INTRAVENOUS PRN
OUTPATIENT
Start: 2025-05-03

## 2025-04-11 RX ORDER — 0.9 % SODIUM CHLORIDE 0.9 %
3 VIAL (ML) INJECTION PRN
OUTPATIENT
Start: 2025-05-03

## 2025-04-11 RX ORDER — 0.9 % SODIUM CHLORIDE 0.9 %
10 VIAL (ML) INJECTION PRN
Status: CANCELLED | OUTPATIENT
Start: 2025-04-18

## 2025-04-11 RX ORDER — EPINEPHRINE 1 MG/ML(1)
0.5 AMPUL (ML) INJECTION PRN
Status: CANCELLED | OUTPATIENT
Start: 2025-04-19

## 2025-04-11 RX ORDER — 0.9 % SODIUM CHLORIDE 0.9 %
VIAL (ML) INJECTION PRN
Status: CANCELLED | OUTPATIENT
Start: 2025-04-19

## 2025-04-11 RX ORDER — 0.9 % SODIUM CHLORIDE 0.9 %
10 VIAL (ML) INJECTION PRN
Status: CANCELLED | OUTPATIENT
Start: 2025-04-19

## 2025-04-11 RX ORDER — 0.9 % SODIUM CHLORIDE 0.9 %
10 VIAL (ML) INJECTION PRN
OUTPATIENT
Start: 2025-05-03

## 2025-04-11 RX ORDER — METHYLPREDNISOLONE SODIUM SUCCINATE 125 MG/2ML
125 INJECTION, POWDER, LYOPHILIZED, FOR SOLUTION INTRAMUSCULAR; INTRAVENOUS PRN
Status: CANCELLED | OUTPATIENT
Start: 2025-04-19

## 2025-04-11 RX ORDER — SODIUM CHLORIDE 9 MG/ML
INJECTION, SOLUTION INTRAVENOUS CONTINUOUS
OUTPATIENT
Start: 2025-05-03

## 2025-04-11 RX ORDER — ONDANSETRON 8 MG/1
8 TABLET, ORALLY DISINTEGRATING ORAL PRN
Status: CANCELLED | OUTPATIENT
Start: 2025-04-19

## 2025-04-11 RX ORDER — DIPHENHYDRAMINE HYDROCHLORIDE 50 MG/ML
50 INJECTION, SOLUTION INTRAMUSCULAR; INTRAVENOUS PRN
Status: CANCELLED | OUTPATIENT
Start: 2025-04-19

## 2025-04-11 RX ORDER — PROCHLORPERAZINE MALEATE 10 MG
10 TABLET ORAL EVERY 6 HOURS PRN
OUTPATIENT
Start: 2025-05-03

## 2025-04-11 RX ORDER — DIPHENHYDRAMINE HYDROCHLORIDE 50 MG/ML
25 INJECTION, SOLUTION INTRAMUSCULAR; INTRAVENOUS ONCE
OUTPATIENT
Start: 2025-05-03 | End: 2025-05-03

## 2025-04-11 RX ORDER — 0.9 % SODIUM CHLORIDE 0.9 %
3 VIAL (ML) INJECTION PRN
Status: CANCELLED | OUTPATIENT
Start: 2025-04-18

## 2025-04-11 RX ORDER — 0.9 % SODIUM CHLORIDE 0.9 %
3 VIAL (ML) INJECTION PRN
Status: CANCELLED | OUTPATIENT
Start: 2025-04-19

## 2025-04-11 RX ORDER — ONDANSETRON 8 MG/1
8 TABLET, ORALLY DISINTEGRATING ORAL PRN
OUTPATIENT
Start: 2025-05-03

## 2025-04-11 RX ORDER — EPINEPHRINE 1 MG/ML(1)
0.5 AMPUL (ML) INJECTION PRN
OUTPATIENT
Start: 2025-05-03

## 2025-04-11 RX ORDER — DEXAMETHASONE SODIUM PHOSPHATE 4 MG/ML
12 INJECTION, SOLUTION INTRA-ARTICULAR; INTRALESIONAL; INTRAMUSCULAR; INTRAVENOUS; SOFT TISSUE ONCE
OUTPATIENT
Start: 2025-05-03 | End: 2025-05-03

## 2025-04-11 RX ORDER — DIPHENHYDRAMINE HYDROCHLORIDE 50 MG/ML
25 INJECTION, SOLUTION INTRAMUSCULAR; INTRAVENOUS ONCE
Status: CANCELLED | OUTPATIENT
Start: 2025-04-19 | End: 2025-04-19

## 2025-04-11 RX ORDER — 0.9 % SODIUM CHLORIDE 0.9 %
VIAL (ML) INJECTION PRN
OUTPATIENT
Start: 2025-05-03

## 2025-04-11 RX ORDER — METHYLPREDNISOLONE SODIUM SUCCINATE 125 MG/2ML
125 INJECTION, POWDER, LYOPHILIZED, FOR SOLUTION INTRAMUSCULAR; INTRAVENOUS PRN
OUTPATIENT
Start: 2025-05-03

## 2025-04-11 RX ORDER — DEXAMETHASONE SODIUM PHOSPHATE 4 MG/ML
12 INJECTION, SOLUTION INTRA-ARTICULAR; INTRALESIONAL; INTRAMUSCULAR; INTRAVENOUS; SOFT TISSUE ONCE
Status: CANCELLED | OUTPATIENT
Start: 2025-04-19 | End: 2025-04-19

## 2025-04-11 NOTE — PROGRESS NOTES
"Pharmacy Chemotherapy calculation    DX: Hodgkin Lymphoma    Cycle 5, Day 1  Previous treatment = C4D15 on 4/5/25    Regimen:  --> de-escalate to AVD   *Dosing Reference*  Doxorubicin 25 mg/m2 IV push on Days 1 and 15   - 3/22/25 C4D1: reduce dose 50% for elevated LFTs today per Dr Silver   - 2/22/25: removed from treatment plan per MD  Vinblastine 6 mg/m2 IV over 5-10 minutes on Days 1 and 15   - 3/22/25 C4D1: reduce dose 50% for elevated LFTs today per Dr Silver  Dacarbazine 375 mg/m2 IV over 30 minutes on Days 1 and 15  Primary treatment: 28-day cycle for 2 cycles conditionally followed by   Additional therapy: Based on Deauville score: 2 cycles combined modality   - 2/22/25: therapy changed to AVD and to be continued x4 more cycles  NCCN Guidelines for Hodgkin Lymphoma V.3.2024.  Demetria AUGUSTE, et al. N Engl J Med. 2010;363(7):640-52.  Abhijit WASSERMAN et al. N Engl J Med. 2015;372(17):1598-607.    Allergies: Codeine and Other misc   /70   Pulse 92   Temp 36.2 °C (97.1 °F) (Temporal)   Resp 16   Ht 1.61 m (5' 3.39\")   Wt 71 kg (156 lb 8.4 oz)   SpO2 100%   BMI 27.39 kg/m²   Body surface area is 1.78 meters squared.    Labs 4/18/25:  ANC~ 2400 Plt = 308k   Hgb = 14.2     SCr = 0.74 mg/dL CrCl ~ >125mL/min   AST/ALT/AP = WNL TBili = 0.4       11/21/24 ECHO LVEF 65%  12/17/24 PFT: DLCO is normal.    Doxorubicin 25 mg/m2 x 1.78 m2 = 44.5 mg   <10% difference, okay to treat with final dose = 42 mg IV    Vinblastine 6 mg/m2 x 1.78 m2 = 10.68 mg   <10% difference, okay to treat with final dose = 10.5 mg IV    Dacarbazine 375 mg/m2 x 1.78 m2 = 667.5 mg   <10% difference, okay to treat with final dose = 656.3 mg IV    Lisa Martin, PharmD  "

## 2025-04-18 ENCOUNTER — HOSPITAL ENCOUNTER (OUTPATIENT)
Dept: HEMATOLOGY ONCOLOGY | Facility: MEDICAL CENTER | Age: 25
End: 2025-04-18
Attending: STUDENT IN AN ORGANIZED HEALTH CARE EDUCATION/TRAINING PROGRAM
Payer: COMMERCIAL

## 2025-04-18 ENCOUNTER — HOSPITAL ENCOUNTER (OUTPATIENT)
Dept: LAB | Facility: MEDICAL CENTER | Age: 25
End: 2025-04-18
Attending: STUDENT IN AN ORGANIZED HEALTH CARE EDUCATION/TRAINING PROGRAM
Payer: COMMERCIAL

## 2025-04-18 VITALS
BODY MASS INDEX: 28.1 KG/M2 | OXYGEN SATURATION: 100 % | WEIGHT: 158.6 LBS | TEMPERATURE: 97.9 F | HEIGHT: 63 IN | SYSTOLIC BLOOD PRESSURE: 116 MMHG | HEART RATE: 72 BPM | DIASTOLIC BLOOD PRESSURE: 62 MMHG

## 2025-04-18 DIAGNOSIS — F41.8 PERFORMANCE ANXIETY: ICD-10-CM

## 2025-04-18 DIAGNOSIS — C81.72 OTHER CLASSICAL HODGKIN LYMPHOMA OF INTRATHORACIC LYMPH NODES (HCC): ICD-10-CM

## 2025-04-18 LAB
ALBUMIN SERPL BCP-MCNC: 4.5 G/DL (ref 3.2–4.9)
ALBUMIN/GLOB SERPL: 1.7 G/DL
ALP SERPL-CCNC: 68 U/L (ref 30–99)
ALT SERPL-CCNC: 34 U/L (ref 2–50)
ANION GAP SERPL CALC-SCNC: 11 MMOL/L (ref 7–16)
AST SERPL-CCNC: 23 U/L (ref 12–45)
BASOPHILS # BLD AUTO: 0.7 % (ref 0–1.8)
BASOPHILS # BLD: 0.03 K/UL (ref 0–0.12)
BILIRUB SERPL-MCNC: 0.4 MG/DL (ref 0.1–1.5)
BUN SERPL-MCNC: 11 MG/DL (ref 8–22)
CALCIUM ALBUM COR SERPL-MCNC: 9.3 MG/DL (ref 8.5–10.5)
CALCIUM SERPL-MCNC: 9.7 MG/DL (ref 8.5–10.5)
CHLORIDE SERPL-SCNC: 106 MMOL/L (ref 96–112)
CO2 SERPL-SCNC: 23 MMOL/L (ref 20–33)
CREAT SERPL-MCNC: 0.74 MG/DL (ref 0.5–1.4)
EOSINOPHIL # BLD AUTO: 0.09 K/UL (ref 0–0.51)
EOSINOPHIL NFR BLD: 2.2 % (ref 0–6.9)
ERYTHROCYTE [DISTWIDTH] IN BLOOD BY AUTOMATED COUNT: 44.2 FL (ref 35.9–50)
GFR SERPLBLD CREATININE-BSD FMLA CKD-EPI: 115 ML/MIN/1.73 M 2
GLOBULIN SER CALC-MCNC: 2.6 G/DL (ref 1.9–3.5)
GLUCOSE SERPL-MCNC: 93 MG/DL (ref 65–99)
HCT VFR BLD AUTO: 41.6 % (ref 37–47)
HGB BLD-MCNC: 14.2 G/DL (ref 12–16)
IMM GRANULOCYTES # BLD AUTO: 0.01 K/UL (ref 0–0.11)
IMM GRANULOCYTES NFR BLD AUTO: 0.2 % (ref 0–0.9)
LDH SERPL L TO P-CCNC: 195 U/L (ref 107–266)
LYMPHOCYTES # BLD AUTO: 1.04 K/UL (ref 1–4.8)
LYMPHOCYTES NFR BLD: 25.5 % (ref 22–41)
MCH RBC QN AUTO: 29 PG (ref 27–33)
MCHC RBC AUTO-ENTMCNC: 34.1 G/DL (ref 32.2–35.5)
MCV RBC AUTO: 85.1 FL (ref 81.4–97.8)
MONOCYTES # BLD AUTO: 0.51 K/UL (ref 0–0.85)
MONOCYTES NFR BLD AUTO: 12.5 % (ref 0–13.4)
NEUTROPHILS # BLD AUTO: 2.4 K/UL (ref 1.82–7.42)
NEUTROPHILS NFR BLD: 58.9 % (ref 44–72)
NRBC # BLD AUTO: 0 K/UL
NRBC BLD-RTO: 0 /100 WBC (ref 0–0.2)
PLATELET # BLD AUTO: 308 K/UL (ref 164–446)
PMV BLD AUTO: 9.7 FL (ref 9–12.9)
POTASSIUM SERPL-SCNC: 4.7 MMOL/L (ref 3.6–5.5)
PROT SERPL-MCNC: 7.1 G/DL (ref 6–8.2)
RBC # BLD AUTO: 4.89 M/UL (ref 4.2–5.4)
SODIUM SERPL-SCNC: 140 MMOL/L (ref 135–145)
URATE SERPL-MCNC: 4.4 MG/DL (ref 1.9–8.2)
WBC # BLD AUTO: 4.1 K/UL (ref 4.8–10.8)

## 2025-04-18 PROCEDURE — 99212 OFFICE O/P EST SF 10 MIN: CPT | Performed by: STUDENT IN AN ORGANIZED HEALTH CARE EDUCATION/TRAINING PROGRAM

## 2025-04-18 PROCEDURE — 80053 COMPREHEN METABOLIC PANEL: CPT

## 2025-04-18 PROCEDURE — 83615 LACTATE (LD) (LDH) ENZYME: CPT

## 2025-04-18 PROCEDURE — 99215 OFFICE O/P EST HI 40 MIN: CPT | Performed by: STUDENT IN AN ORGANIZED HEALTH CARE EDUCATION/TRAINING PROGRAM

## 2025-04-18 PROCEDURE — 85025 COMPLETE CBC W/AUTO DIFF WBC: CPT

## 2025-04-18 PROCEDURE — 36415 COLL VENOUS BLD VENIPUNCTURE: CPT

## 2025-04-18 PROCEDURE — 84550 ASSAY OF BLOOD/URIC ACID: CPT

## 2025-04-18 RX ORDER — PROPRANOLOL HYDROCHLORIDE 10 MG/1
10 TABLET ORAL 2 TIMES DAILY PRN
Qty: 60 TABLET | Refills: 1 | Status: SHIPPED | OUTPATIENT
Start: 2025-04-18

## 2025-04-18 ASSESSMENT — PAIN SCALES - GENERAL: PAINLEVEL_OUTOF10: NO PAIN

## 2025-04-18 ASSESSMENT — FIBROSIS 4 INDEX: FIB4 SCORE: 0.33

## 2025-04-19 ENCOUNTER — OUTPATIENT INFUSION SERVICES (OUTPATIENT)
Dept: ONCOLOGY | Facility: MEDICAL CENTER | Age: 25
End: 2025-04-19
Attending: STUDENT IN AN ORGANIZED HEALTH CARE EDUCATION/TRAINING PROGRAM
Payer: MEDICAID

## 2025-04-19 VITALS
WEIGHT: 156.53 LBS | HEIGHT: 63 IN | SYSTOLIC BLOOD PRESSURE: 105 MMHG | BODY MASS INDEX: 27.73 KG/M2 | RESPIRATION RATE: 16 BRPM | TEMPERATURE: 97.1 F | OXYGEN SATURATION: 100 % | HEART RATE: 92 BPM | DIASTOLIC BLOOD PRESSURE: 70 MMHG

## 2025-04-19 DIAGNOSIS — C81.72 OTHER CLASSICAL HODGKIN LYMPHOMA OF INTRATHORACIC LYMPH NODES (HCC): ICD-10-CM

## 2025-04-19 PROBLEM — F41.8 PERFORMANCE ANXIETY: Status: ACTIVE | Noted: 2025-04-19

## 2025-04-19 PROCEDURE — 96417 CHEMO IV INFUS EACH ADDL SEQ: CPT

## 2025-04-19 PROCEDURE — 96413 CHEMO IV INFUSION 1 HR: CPT

## 2025-04-19 PROCEDURE — 96411 CHEMO IV PUSH ADDL DRUG: CPT

## 2025-04-19 PROCEDURE — 700102 HCHG RX REV CODE 250 W/ 637 OVERRIDE(OP): Mod: UD | Performed by: NURSE PRACTITIONER

## 2025-04-19 PROCEDURE — 700111 HCHG RX REV CODE 636 W/ 250 OVERRIDE (IP): Mod: JZ,UD | Performed by: NURSE PRACTITIONER

## 2025-04-19 PROCEDURE — 96375 TX/PRO/DX INJ NEW DRUG ADDON: CPT

## 2025-04-19 PROCEDURE — A4212 NON CORING NEEDLE OR STYLET: HCPCS

## 2025-04-19 PROCEDURE — A9270 NON-COVERED ITEM OR SERVICE: HCPCS | Mod: UD | Performed by: NURSE PRACTITIONER

## 2025-04-19 PROCEDURE — 700105 HCHG RX REV CODE 258: Mod: UD | Performed by: NURSE PRACTITIONER

## 2025-04-19 PROCEDURE — 96367 TX/PROPH/DG ADDL SEQ IV INF: CPT

## 2025-04-19 RX ORDER — PALONOSETRON 0.05 MG/ML
0.25 INJECTION, SOLUTION INTRAVENOUS ONCE
Status: COMPLETED | OUTPATIENT
Start: 2025-04-19 | End: 2025-04-19

## 2025-04-19 RX ORDER — SODIUM CHLORIDE 9 MG/ML
INJECTION, SOLUTION INTRAVENOUS CONTINUOUS
Status: DISCONTINUED | OUTPATIENT
Start: 2025-04-19 | End: 2025-04-19 | Stop reason: HOSPADM

## 2025-04-19 RX ORDER — DEXAMETHASONE SODIUM PHOSPHATE 4 MG/ML
12 INJECTION, SOLUTION INTRA-ARTICULAR; INTRALESIONAL; INTRAMUSCULAR; INTRAVENOUS; SOFT TISSUE ONCE
Status: COMPLETED | OUTPATIENT
Start: 2025-04-19 | End: 2025-04-19

## 2025-04-19 RX ORDER — DIPHENHYDRAMINE HYDROCHLORIDE 50 MG/ML
25 INJECTION, SOLUTION INTRAMUSCULAR; INTRAVENOUS ONCE
Status: COMPLETED | OUTPATIENT
Start: 2025-04-19 | End: 2025-04-19

## 2025-04-19 RX ORDER — ACETAMINOPHEN 325 MG/1
650 TABLET ORAL ONCE
Status: COMPLETED | OUTPATIENT
Start: 2025-04-19 | End: 2025-04-19

## 2025-04-19 RX ADMIN — DEXAMETHASONE SODIUM PHOSPHATE 12 MG: 4 INJECTION INTRA-ARTICULAR; INTRALESIONAL; INTRAMUSCULAR; INTRAVENOUS; SOFT TISSUE at 07:49

## 2025-04-19 RX ADMIN — FOSAPREPITANT 150 MG: 150 INJECTION, POWDER, LYOPHILIZED, FOR SOLUTION INTRAVENOUS at 07:57

## 2025-04-19 RX ADMIN — SODIUM CHLORIDE 656.3 MG: 9 INJECTION, SOLUTION INTRAVENOUS at 09:56

## 2025-04-19 RX ADMIN — DIPHENHYDRAMINE HYDROCHLORIDE 25 MG: 50 INJECTION INTRAMUSCULAR; INTRAVENOUS at 07:43

## 2025-04-19 RX ADMIN — DOXORUBICIN HYDROCHLORIDE 42 MG: 2 INJECTION, SOLUTION INTRAVENOUS at 08:37

## 2025-04-19 RX ADMIN — ACETAMINOPHEN 650 MG: 325 TABLET, FILM COATED ORAL at 07:40

## 2025-04-19 RX ADMIN — SODIUM CHLORIDE: 9 INJECTION, SOLUTION INTRAVENOUS at 07:40

## 2025-04-19 RX ADMIN — PALONOSETRON HYDROCHLORIDE 0.25 MG: 0.25 INJECTION INTRAVENOUS at 07:41

## 2025-04-19 RX ADMIN — VINBLASTINE SULFATE 10.5 MG: 1 INJECTION INTRAVENOUS at 09:40

## 2025-04-19 ASSESSMENT — ENCOUNTER SYMPTOMS
CONSTIPATION: 0
INSOMNIA: 0
DIZZINESS: 0
CHILLS: 0
BACK PAIN: 0
FEVER: 0
WHEEZING: 0
BLURRED VISION: 0
MYALGIAS: 0
SHORTNESS OF BREATH: 0
HEARTBURN: 0
DIAPHORESIS: 0
DOUBLE VISION: 0
VOMITING: 0
ORTHOPNEA: 0
BLOOD IN STOOL: 0
SINUS PAIN: 0
PALPITATIONS: 0
BRUISES/BLEEDS EASILY: 0
DIARRHEA: 0
SORE THROAT: 0
WEIGHT LOSS: 0
NAUSEA: 0
WEAKNESS: 0
SPUTUM PRODUCTION: 0
TINGLING: 0
COUGH: 0
NERVOUS/ANXIOUS: 1
ABDOMINAL PAIN: 0
HEADACHES: 0

## 2025-04-19 ASSESSMENT — FIBROSIS 4 INDEX: FIB4 SCORE: 0.32

## 2025-04-19 NOTE — PROGRESS NOTES
Albert arrived ambulatory to the infusion center for Day 1, Cycle 5 of DOXOrubicin + vinBLAStine+ dacarbazine (Bleomycin previously removed from plan). Plan of care reviewed, assessment completed, patient denies any new or acute changes, denies shortness of breath.  Port accessed with sterile technique, flushed briskly, brisk blood return noted, dressed with hypoallergenic dressing.   Labs reviewed from 4/18, patient is within set parameters for treatment today.   Pre-medications administered per the MAR.  Doxorubicin administered per the MAR.   Vinblastine administered over 10 minutes.   Dacarbazine administered over 30 minutes.   Patient tolerated todays chemotherapy well with no signs of adverse reaction observed or reported. Port flushed before and after chemotherapy administrations blood return noted each time. Port flushed per the protocol and de-accessed, site dressed with gauze and tape. Next appointment was confirmed with the patient and she was discharged home in stable condition with a family member.

## 2025-04-19 NOTE — PROGRESS NOTES
"Pharmacy Chemotherapy Calculation:    Dx: Hodgkin's Lymphoma stage IIB       Protocol: AVD (DOXOrubicin/VinBLAStine/Dacarbazine)  *Dosing Reference*  DOXOrubicin 25 mg/m² IV push on Days 1 and 15   3/22/25 Dose reduced by 50% (12.5 mg/m2) for today C4D1 for elevated AST/ALT per Dr. Silver.  VinBLAStine 6 mg/m² IV over 5 - 10 minutes on Days 1 and 15   3/22/25 Dose reduced by 50% (3 mg/m2) for today C4D1 for elevated AST/ALT per Dr. Silver.  Dacarbazine 375 mg/m² IV over 30 minutes on Days 1 and 15  28-day cycle for 4 cycles conditionally following 2 cycles of ABVD or 2 cycles of BEACOPP based on Deauville score  NCCN Guidelines® for Hodgkin Lymphoma V.3.2024.   Clarence P, et al. N Engl J Med. 2016;374(25):2417-29.a   - 2/22/25: therapy changed from ABVD to AVD and to be continued x4 more cycles   Allergies:  Codeine and Other misc     /70   Pulse 92   Temp 36.2 °C (97.1 °F) (Temporal)   Resp 16   Ht 1.61 m (5' 3.39\")   Wt 71 kg (156 lb 8.4 oz)   SpO2 100%   BMI 27.39 kg/m²   Body surface area is 1.78 meters squared.      11/21/24 15:15   Left Ventrical Ejection Fraction 65     Labs 4/18/25  ANC~ 2400 Plt = 308k   Hgb = 14.2     SCr = 0.74 mg/dL CrCl ~ >125 mL/min   LFT's = WNLs  TBili = 0.4     Drug Order   (Drug name, dose, route, IV Fluid & volume, frequency, number of doses) Cycle 5, Day 1  Previous treatment: C4D15 on 4/5/25     Medication = DOXOrubicin  Base Dose = 25 mg/m2  Calc Dose: Base Dose x 1.78 m2 = 44.5 mg  Final Dose = 42 mg  Route = IV  Fluid & Volume = 21 mL empty bag  Admin Duration = Over 20 minutes          <10% difference, OK to treat with final dose   Medication = VinBLAStine  Base Dose = 6 mg/m2  Calc Dose:Base Dose x 1.78 m2 = 10.68 mg  Final Dose = 10.5 mg  Route = IV  Fluid & Volume = NS 25 mL  Admin Duration = Over 5-10 minutes          <10% difference, OK to treat with final dose   Medication = Dacarbazine  Base Dose = 375 mg/m2  Calc Dose: Base Dose x 1.78 m2 = 667.5 mg  Final " Dose = 656.3 mg  Route = IV  Fluid & Volume =  mL  Admin Duration = Over 30 minutes          <10% difference, OK to treat with final dose   By my signature below, I confirm this process was performed independently with the BSA and all final chemotherapy dosing calculations congruent. I have reviewed the above chemotherapy order and that my calculation of the final dose and BSA (when applicable) corroborate those calculations of the  pharmacist. Discrepancies of 10% or greater in the written dose have been addressed and documented within the EPIC Progress notes.    Chirag Bazan, PharmD

## 2025-04-19 NOTE — PROGRESS NOTES
Chemotherapy Verification - PRIMARY RN      Height = 161cm  Weight = 71kg  BSA = 1.78m2       Medication: DOXOrubicin (Adriamycin)  Dose: 25mg/m2  Calculated Dose: 44.5mg                             (In mg/m2, AUC, mg/kg)     Medication: vinBLAStine (Velban)  Dose: 6mg/m2  Calculated Dose: 10.68mg                             (In mg/m2, AUC, mg/kg)    Medication: dacarbazine (Dtic)  Dose: 375mg/m2  Calculated Dose: 667.5mg                             (In mg/m2, AUC, mg/kg)        I confirm this process was performed independently with the BSA and all final chemotherapy dosing calculations congruent.  Any discrepancies of 10% or greater have been addressed with the chemotherapy pharmacist. The resolution of the discrepancy has been documented in the EPIC progress notes.

## 2025-04-19 NOTE — PROGRESS NOTES
Chemotherapy Verification - SECONDARY RN   C5 D1     Height = 161 cm  Weight = 71 kg  BSA = 1.78 m^2       Medication: doxorubicin (ADRIAMYCIN)  Dose: 25 mg/m2  Calculated Dose: 44.5 mg                             (In mg/m2, AUC, mg/kg)     Medication: vinblastine (VELBAN)  Dose: 6 mg/m2  Calculated Dose: 10.68 mg                             (In mg/m2, AUC, mg/kg)    Medication: dacarbazine (DTIC)  Dose: 375 mg/m2  Calculated Dose: 667.5 mg                             (In mg/m2, AUC, mg/kg)        I confirm that this process was performed independently.

## 2025-04-20 NOTE — PROGRESS NOTES
Follow Up Note:  Hematology/Oncology      Primary Care:  Margie Rhodes P.A.-C.    Diagnosis: Classical Hodgkin's lymphoma, stage IIB    Chief Complaint: On-treatment visit    Current Treatment: AVD x 4 more cycles    Prior Treatment: ABVD x 2 cycles    Oncology History of Presenting Illness:  Albert Gillette is a 24 y.o. [place] woman who presents to the clinic for evaluation for systemic therapy for a new diagnosis of classical Hodgkin's lymphoma. She noticed a lump in her neck at the beginning of August and ultimately was referred to our intake oncology coordinator after US showed lymphadenopathy in the left neck. She had it monitored and they did not change in size at all, but did protrude with certain movements. She also reports having significant night sweats and fatigue over the past several months. She was sent for a CT scan of the soft tissue/neck which revealed more nodes in the anterior mediastinum. She ultimately went through a core biopsy which revealed classical Hodgkin's lymphoma. She underwent a PET scan which revealed stage IIA disease. She presents for evaluation accordingly.     Treatment History:   12/27/24: C1D1 ABVD  01/25/25: C2D1 ABVD *delayed 1 day due to test at school (planned)  02/22/25: C3 AVD   03/22/25: C4 AVD  04/19/25: C5 AVD scheduled    Interval History:  Patient is here for follow up visit. She is doing well with therapy and has no complaints there. However, she has noticed that she gets situational anxiety which can be predicted when she needs to speak to people on in specific work settings. She is wondering if there is something to help her with this. She otherwise feels fine.     Allergies as of 04/18/2025 - Reviewed 04/18/2025   Allergen Reaction Noted    Codeine  07/24/2024    Other misc  12/06/2024         Current Outpatient Medications:     propranolol (INDERAL) 10 MG Tab, Take 1 Tablet by mouth 2 times a day as needed (Situational anxiety)., Disp: 60 Tablet,  "Rfl: 1    lidocaine-prilocaine (EMLA) 2.5-2.5 % Cream, Apply to port 1 hour prior to access of port and cover with plastic wrap., Disp: 1 g, Rfl: 3    ondansetron (ZOFRAN) 4 MG Tab tablet, Take 1 Tablet by mouth every four hours as needed for Nausea/Vomiting (for nausea, vomiting)., Disp: 30 Tablet, Rfl: 6    prochlorperazine (COMPAZINE) 10 MG Tab, Take 1 Tablet by mouth every 6 hours as needed (for nausea, vomiting)., Disp: 30 Tablet, Rfl: 6    OLANZapine (ZYPREXA) 5 MG Tab, Take 1 Tablet by mouth every evening., Disp: 30 Tablet, Rfl: 6    Levonorgestrel (MIRENA, 52 MG, IU), by Intrauterine route. MG unknown, Disp: , Rfl:       Review of Systems:  Review of Systems   Constitutional:  Negative for chills, diaphoresis, fever, malaise/fatigue and weight loss.   HENT:  Negative for hearing loss, nosebleeds, sinus pain and sore throat.    Eyes:  Negative for blurred vision and double vision.   Respiratory:  Negative for cough, sputum production, shortness of breath and wheezing.    Cardiovascular:  Negative for chest pain, palpitations, orthopnea and leg swelling.   Gastrointestinal:  Negative for abdominal pain, blood in stool, constipation, diarrhea, heartburn, melena, nausea and vomiting.   Genitourinary:  Negative for dysuria, frequency, hematuria and urgency.   Musculoskeletal:  Negative for back pain, joint pain and myalgias.   Skin:  Negative for rash.   Neurological:  Negative for dizziness, tingling, weakness and headaches.   Endo/Heme/Allergies:  Does not bruise/bleed easily.   Psychiatric/Behavioral:  The patient is nervous/anxious (Only in certain situations). The patient does not have insomnia.          Physical Exam:  Vitals:    04/18/25 1434   BP: 116/62   Pulse: 72   Temp: 36.6 °C (97.9 °F)   TempSrc: Temporal   SpO2: 100%   Weight: 71.9 kg (158 lb 9.6 oz)   Height: 1.61 m (5' 3.39\")       DESC; KARNOFSKY SCALE WITH ECOG EQUIVALENT: 100, Fully active, able to carry on all pre-disease performed without " restriction (ECOG equivalent 0)    DISTRESS LEVEL: no apparent distress    Physical Exam  Vitals and nursing note reviewed.   Constitutional:       General: She is awake. She is not in acute distress.     Appearance: Normal appearance. She is normal weight. She is not ill-appearing, toxic-appearing or diaphoretic.   HENT:      Head: Normocephalic and atraumatic.      Nose: Nose normal. No congestion.      Mouth/Throat:      Pharynx: Oropharynx is clear. No oropharyngeal exudate or posterior oropharyngeal erythema.   Eyes:      General: No scleral icterus.     Extraocular Movements: Extraocular movements intact.      Conjunctiva/sclera: Conjunctivae normal.      Pupils: Pupils are equal, round, and reactive to light.   Cardiovascular:      Rate and Rhythm: Normal rate and regular rhythm.      Pulses: Normal pulses.      Heart sounds: Normal heart sounds. No murmur heard.     No friction rub. No gallop.   Pulmonary:      Effort: Pulmonary effort is normal.      Breath sounds: Normal breath sounds. No decreased air movement. No wheezing, rhonchi or rales.   Abdominal:      General: Bowel sounds are normal. There is no distension.      Tenderness: There is no abdominal tenderness.   Musculoskeletal:         General: No deformity. Normal range of motion.      Cervical back: Normal range of motion and neck supple. No tenderness.      Right lower leg: No edema.      Left lower leg: No edema.   Lymphadenopathy:      Cervical: No cervical adenopathy.      Upper Body:      Right upper body: No axillary adenopathy.      Left upper body: No axillary adenopathy.      Lower Body: No right inguinal adenopathy. No left inguinal adenopathy.   Skin:     General: Skin is warm and dry.      Coloration: Skin is not jaundiced.      Findings: No erythema or rash.   Neurological:      General: No focal deficit present.      Mental Status: She is alert and oriented to person, place, and time.      Sensory: Sensation is intact.      Motor:  Motor function is intact. No weakness.      Gait: Gait is intact.   Psychiatric:         Attention and Perception: Attention normal.         Mood and Affect: Mood normal.         Behavior: Behavior normal. Behavior is cooperative.         Thought Content: Thought content normal.         Judgment: Judgment normal.           Labs:  Hospital Outpatient Visit on 04/18/2025   Component Date Value Ref Range Status    WBC 04/18/2025 4.1 (L)  4.8 - 10.8 K/uL Final    RBC 04/18/2025 4.89  4.20 - 5.40 M/uL Final    Hemoglobin 04/18/2025 14.2  12.0 - 16.0 g/dL Final    Hematocrit 04/18/2025 41.6  37.0 - 47.0 % Final    MCV 04/18/2025 85.1  81.4 - 97.8 fL Final    MCH 04/18/2025 29.0  27.0 - 33.0 pg Final    MCHC 04/18/2025 34.1  32.2 - 35.5 g/dL Final    RDW 04/18/2025 44.2  35.9 - 50.0 fL Final    Platelet Count 04/18/2025 308  164 - 446 K/uL Final    MPV 04/18/2025 9.7  9.0 - 12.9 fL Final    Neutrophils-Polys 04/18/2025 58.90  44.00 - 72.00 % Final    Lymphocytes 04/18/2025 25.50  22.00 - 41.00 % Final    Monocytes 04/18/2025 12.50  0.00 - 13.40 % Final    Eosinophils 04/18/2025 2.20  0.00 - 6.90 % Final    Basophils 04/18/2025 0.70  0.00 - 1.80 % Final    Immature Granulocytes 04/18/2025 0.20  0.00 - 0.90 % Final    Nucleated RBC 04/18/2025 0.00  0.00 - 0.20 /100 WBC Final    Neutrophils (Absolute) 04/18/2025 2.40  1.82 - 7.42 K/uL Final    Includes immature neutrophils, if present.    Lymphs (Absolute) 04/18/2025 1.04  1.00 - 4.80 K/uL Final    Monos (Absolute) 04/18/2025 0.51  0.00 - 0.85 K/uL Final    Eos (Absolute) 04/18/2025 0.09  0.00 - 0.51 K/uL Final    Baso (Absolute) 04/18/2025 0.03  0.00 - 0.12 K/uL Final    Immature Granulocytes (abs) 04/18/2025 0.01  0.00 - 0.11 K/uL Final    NRBC (Absolute) 04/18/2025 0.00  K/uL Final    Sodium 04/18/2025 140  135 - 145 mmol/L Final    Potassium 04/18/2025 4.7  3.6 - 5.5 mmol/L Final    Chloride 04/18/2025 106  96 - 112 mmol/L Final    Co2 04/18/2025 23  20 - 33 mmol/L Final     Anion Gap 04/18/2025 11.0  7.0 - 16.0 Final    Glucose 04/18/2025 93  65 - 99 mg/dL Final    Bun 04/18/2025 11  8 - 22 mg/dL Final    Creatinine 04/18/2025 0.74  0.50 - 1.40 mg/dL Final    Calcium 04/18/2025 9.7  8.5 - 10.5 mg/dL Final    Correct Calcium 04/18/2025 9.3  8.5 - 10.5 mg/dL Final    AST(SGOT) 04/18/2025 23  12 - 45 U/L Final    ALT(SGPT) 04/18/2025 34  2 - 50 U/L Final    Alkaline Phosphatase 04/18/2025 68  30 - 99 U/L Final    Total Bilirubin 04/18/2025 0.4  0.1 - 1.5 mg/dL Final    Albumin 04/18/2025 4.5  3.2 - 4.9 g/dL Final    Total Protein 04/18/2025 7.1  6.0 - 8.2 g/dL Final    Globulin 04/18/2025 2.6  1.9 - 3.5 g/dL Final    A-G Ratio 04/18/2025 1.7  g/dL Final    LDH Total 04/18/2025 195  107 - 266 U/L Final    Uric Acid 04/18/2025 4.4  1.9 - 8.2 mg/dL Final    GFR (CKD-EPI) 04/18/2025 115  >60 mL/min/1.73 m 2 Final    Comment: Estimated Glomerular Filtration Rate is calculated using  race neutral CKD-EPI 2021 equation per NKF-ASN recommendations.         Imaging:     All listed images below have been independently reviewed by me. I agree with the findings as summarized below:    No results found.      Pathology:    FINAL DIAGNOSIS:     A. Left cervical lymph node:          Lymph node cores demonstrating classic Hodgkin lymphoma with           bands of fibrosis noted.     PRECURSOR AND MATURE LYMPHOID MALIGNANCIES     TUMOR    Site(s) of Tumor Involvement in Sample:  Lymph node     Final Integrated Diagnosis:  Classic Hodgkin lymphoma   SPECIAL STUDIES     Immunohistochemistry:  Please refer to microscopic description below     for details.     Flow Cytometry:  No aberrancy detected at level of sensitivity of     assay     Conventional Cytogenetics:  Not performed     Fluorescence in situ Hybridization:  Not performed     Molecular Alterations Detected:  Not performed.     Comment: This case has been reviewed by a second pathologist, Dr. Ivan, who concurs with the diagnosis.                                          Diagnosis performed by:                                       BETSEY BLACK MD     Assessment & Plan:  1. 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 Performance anxiety  propranolol (INDERAL) 10 MG Tab        This is a 24 year old [place] woman with stage IIB classical Hodgkin's lymphoma. She presents for evaluation.      Current Diagnosis and Staging: Classical Hodgkin's lymphoma, stage IIB    Update: Patient is doing well. Continue with C5 of AVD tomorrow. Propranolol prescribed for performance anxiety. Patient will let us know how that works for her.      Treatment Plan: ABVD x 2 cycles, now AVD x 4 cycles to complete therapy     Treatment Citation: NCCN     Plan of Care:     Primary Therapy: C5 AVD on Saturday  Supportive Therapy: Antiemetics per protocol  Toxicity: Patient is getting antineoplastic therapy for a life-threatening malignancy and needs monitoring of blood counts, hepatic function, and renal function due to potential for organ dysfunction. This treatment poses a risk of toxicity that could lead to long term disruption of bodily function or death.  Labs: CBC with diff, CMP, LDH monitoring  Imaging: Repeat PET scan after C6  Treatment Planning: Patient will start therapy with ABVD x 2 cycles and then reassess with PET scan. PET scan showed CR so she will continue with 4 more cycles of AVD, to complete therapy.   Consultations: Surgical oncology (Dr. Navarro)  Code Status: Full  Miscellaneous: NA  Return for Follow Up: 4 weeks    Any questions and concerns raised by the patient were answered to the best of my ability. Thank you for allowing me to participate in the care for this patient. Please feel free to contact me for any questions or concerns.

## 2025-05-03 ENCOUNTER — OUTPATIENT INFUSION SERVICES (OUTPATIENT)
Dept: ONCOLOGY | Facility: MEDICAL CENTER | Age: 25
End: 2025-05-03
Attending: STUDENT IN AN ORGANIZED HEALTH CARE EDUCATION/TRAINING PROGRAM
Payer: MEDICAID

## 2025-05-03 VITALS
BODY MASS INDEX: 27.5 KG/M2 | DIASTOLIC BLOOD PRESSURE: 74 MMHG | HEIGHT: 63 IN | HEART RATE: 70 BPM | RESPIRATION RATE: 17 BRPM | OXYGEN SATURATION: 100 % | SYSTOLIC BLOOD PRESSURE: 112 MMHG | TEMPERATURE: 97.3 F | WEIGHT: 155.2 LBS

## 2025-05-03 DIAGNOSIS — C81.72 OTHER CLASSICAL HODGKIN LYMPHOMA OF INTRATHORACIC LYMPH NODES (HCC): ICD-10-CM

## 2025-05-03 LAB
ALBUMIN SERPL BCP-MCNC: 4.1 G/DL (ref 3.2–4.9)
ALBUMIN/GLOB SERPL: 1.9 G/DL
ALP SERPL-CCNC: 61 U/L (ref 30–99)
ALT SERPL-CCNC: 19 U/L (ref 2–50)
ANION GAP SERPL CALC-SCNC: 8 MMOL/L (ref 7–16)
AST SERPL-CCNC: 20 U/L (ref 12–45)
BASOPHILS # BLD AUTO: 1 % (ref 0–1.8)
BASOPHILS # BLD: 0.02 K/UL (ref 0–0.12)
BILIRUB SERPL-MCNC: 0.2 MG/DL (ref 0.1–1.5)
BUN SERPL-MCNC: 8 MG/DL (ref 8–22)
CALCIUM ALBUM COR SERPL-MCNC: 8.8 MG/DL (ref 8.5–10.5)
CALCIUM SERPL-MCNC: 8.9 MG/DL (ref 8.5–10.5)
CHLORIDE SERPL-SCNC: 108 MMOL/L (ref 96–112)
CO2 SERPL-SCNC: 22 MMOL/L (ref 20–33)
CREAT SERPL-MCNC: 0.62 MG/DL (ref 0.5–1.4)
EOSINOPHIL # BLD AUTO: 0.06 K/UL (ref 0–0.51)
EOSINOPHIL NFR BLD: 3.1 % (ref 0–6.9)
ERYTHROCYTE [DISTWIDTH] IN BLOOD BY AUTOMATED COUNT: 41.1 FL (ref 35.9–50)
GFR SERPLBLD CREATININE-BSD FMLA CKD-EPI: 126 ML/MIN/1.73 M 2
GLOBULIN SER CALC-MCNC: 2.2 G/DL (ref 1.9–3.5)
GLUCOSE SERPL-MCNC: 99 MG/DL (ref 65–99)
HCT VFR BLD AUTO: 36.6 % (ref 37–47)
HGB BLD-MCNC: 12.4 G/DL (ref 12–16)
IMM GRANULOCYTES # BLD AUTO: 0 K/UL (ref 0–0.11)
IMM GRANULOCYTES NFR BLD AUTO: 0 % (ref 0–0.9)
LYMPHOCYTES # BLD AUTO: 0.79 K/UL (ref 1–4.8)
LYMPHOCYTES NFR BLD: 40.3 % (ref 22–41)
MCH RBC QN AUTO: 29.2 PG (ref 27–33)
MCHC RBC AUTO-ENTMCNC: 33.9 G/DL (ref 32.2–35.5)
MCV RBC AUTO: 86.1 FL (ref 81.4–97.8)
MONOCYTES # BLD AUTO: 0.35 K/UL (ref 0–0.85)
MONOCYTES NFR BLD AUTO: 17.9 % (ref 0–13.4)
NEUTROPHILS # BLD AUTO: 0.74 K/UL (ref 1.82–7.42)
NEUTROPHILS NFR BLD: 37.7 % (ref 44–72)
NRBC # BLD AUTO: 0 K/UL
NRBC BLD-RTO: 0 /100 WBC (ref 0–0.2)
OUTPT INFUS CBC COMMENT OICOM: ABNORMAL
PLATELET # BLD AUTO: 257 K/UL (ref 164–446)
PMV BLD AUTO: 9.7 FL (ref 9–12.9)
POTASSIUM SERPL-SCNC: 3.9 MMOL/L (ref 3.6–5.5)
PROT SERPL-MCNC: 6.3 G/DL (ref 6–8.2)
RBC # BLD AUTO: 4.25 M/UL (ref 4.2–5.4)
SODIUM SERPL-SCNC: 138 MMOL/L (ref 135–145)
WBC # BLD AUTO: 2 K/UL (ref 4.8–10.8)

## 2025-05-03 PROCEDURE — 96411 CHEMO IV PUSH ADDL DRUG: CPT

## 2025-05-03 PROCEDURE — 700111 HCHG RX REV CODE 636 W/ 250 OVERRIDE (IP): Mod: UD | Performed by: NURSE PRACTITIONER

## 2025-05-03 PROCEDURE — A4212 NON CORING NEEDLE OR STYLET: HCPCS

## 2025-05-03 PROCEDURE — 96413 CHEMO IV INFUSION 1 HR: CPT

## 2025-05-03 PROCEDURE — 96375 TX/PRO/DX INJ NEW DRUG ADDON: CPT

## 2025-05-03 PROCEDURE — 96417 CHEMO IV INFUS EACH ADDL SEQ: CPT

## 2025-05-03 PROCEDURE — 96367 TX/PROPH/DG ADDL SEQ IV INF: CPT

## 2025-05-03 PROCEDURE — 700105 HCHG RX REV CODE 258: Mod: UD | Performed by: NURSE PRACTITIONER

## 2025-05-03 PROCEDURE — 85025 COMPLETE CBC W/AUTO DIFF WBC: CPT

## 2025-05-03 PROCEDURE — 700102 HCHG RX REV CODE 250 W/ 637 OVERRIDE(OP): Mod: UD | Performed by: NURSE PRACTITIONER

## 2025-05-03 PROCEDURE — A9270 NON-COVERED ITEM OR SERVICE: HCPCS | Mod: UD | Performed by: NURSE PRACTITIONER

## 2025-05-03 PROCEDURE — 80053 COMPREHEN METABOLIC PANEL: CPT

## 2025-05-03 RX ORDER — ACETAMINOPHEN 325 MG/1
650 TABLET ORAL ONCE
Status: COMPLETED | OUTPATIENT
Start: 2025-05-03 | End: 2025-05-03

## 2025-05-03 RX ORDER — DEXAMETHASONE SODIUM PHOSPHATE 4 MG/ML
12 INJECTION, SOLUTION INTRA-ARTICULAR; INTRALESIONAL; INTRAMUSCULAR; INTRAVENOUS; SOFT TISSUE ONCE
Status: COMPLETED | OUTPATIENT
Start: 2025-05-03 | End: 2025-05-03

## 2025-05-03 RX ORDER — DIPHENHYDRAMINE HYDROCHLORIDE 50 MG/ML
25 INJECTION, SOLUTION INTRAMUSCULAR; INTRAVENOUS ONCE
Status: COMPLETED | OUTPATIENT
Start: 2025-05-03 | End: 2025-05-03

## 2025-05-03 RX ORDER — PALONOSETRON 0.05 MG/ML
0.25 INJECTION, SOLUTION INTRAVENOUS ONCE
Status: COMPLETED | OUTPATIENT
Start: 2025-05-03 | End: 2025-05-03

## 2025-05-03 RX ADMIN — ACETAMINOPHEN 650 MG: 325 TABLET ORAL at 08:09

## 2025-05-03 RX ADMIN — FOSAPREPITANT 150 MG: 150 INJECTION, POWDER, LYOPHILIZED, FOR SOLUTION INTRAVENOUS at 08:20

## 2025-05-03 RX ADMIN — SODIUM CHLORIDE 656.3 MG: 9 INJECTION, SOLUTION INTRAVENOUS at 10:10

## 2025-05-03 RX ADMIN — VINBLASTINE SULFATE 10.5 MG: 1 INJECTION INTRAVENOUS at 09:48

## 2025-05-03 RX ADMIN — PALONOSETRON HYDROCHLORIDE 0.25 MG: 0.25 INJECTION INTRAVENOUS at 08:12

## 2025-05-03 RX ADMIN — DEXAMETHASONE SODIUM PHOSPHATE 12 MG: 4 INJECTION INTRA-ARTICULAR; INTRALESIONAL; INTRAMUSCULAR; INTRAVENOUS; SOFT TISSUE at 08:15

## 2025-05-03 RX ADMIN — DOXORUBICIN HYDROCHLORIDE 42 MG: 2 INJECTION, SOLUTION INTRAVENOUS at 09:03

## 2025-05-03 RX ADMIN — DIPHENHYDRAMINE HYDROCHLORIDE 25 MG: 50 INJECTION INTRAMUSCULAR; INTRAVENOUS at 08:10

## 2025-05-03 ASSESSMENT — FIBROSIS 4 INDEX: FIB4 SCORE: 0.32

## 2025-05-03 NOTE — PROGRESS NOTES
Chemotherapy Verification - SECONDARY RN       Height = 1.61m  Weight = 70.4kg  BSA = 1.77m2       Medication: doxorubicin  Dose: 25mg/m2  Calculated Dose: 44.25 mg                             (In mg/m2, AUC, mg/kg)     Medication: vinblastine  Dose: 6mg/m2  Calculated Dose: 10.62 mg                             (In mg/m2, AUC, mg/kg)    Medication: dacarbazine  Dose: 375mg/m2  Calculated Dose: 663.75 mg                             (In mg/m2, AUC, mg/kg)      I confirm that this process was performed independently.

## 2025-05-03 NOTE — PROGRESS NOTES
Pt arrived to [facility], ambulatory, for C5D15 AVD. Pt voices no new complaints. Port accessed in sterile manner, positive blood return noted. Labs drawn and reviewed, pt meets parameters for treatment today. Neutropenic precautions reinforced with patient. Pre-medications administered with no complications. Doxorubicin, vinblastine, and dacarbazine infused with no s/sx of adverse reaction. Port flushed per policy, port de-accessed. Pt left IS with no s/sx of distress. Follow up appointment confirmed.

## 2025-05-03 NOTE — PROGRESS NOTES
"Pharmacy Chemotherapy calculation    DX: Hodgkin Lymphoma    Cycle 5, Day 15  Previous treatment = C5D1 on 4/19/25    Regimen:  --> de-escalate to AVD   *Dosing Reference*  Doxorubicin 25 mg/m2 IV push on Days 1 and 15   - 3/22/25 C4D1: reduce dose 50% for elevated LFTs today per Dr Silver   - 2/22/25: removed from treatment plan per MD  Vinblastine 6 mg/m2 IV over 5-10 minutes on Days 1 and 15   - 3/22/25 C4D1: reduce dose 50% for elevated LFTs today per Dr Silver  Dacarbazine 375 mg/m2 IV over 30 minutes on Days 1 and 15  Primary treatment: 28-day cycle for 2 cycles conditionally followed by   Additional therapy: Based on Deauville score: 2 cycles combined modality   - 2/22/25: therapy changed to AVD and to be continued x4 more cycles  NCCN Guidelines for Hodgkin Lymphoma V.3.2024.  Demetria AUGUSTE, et al. N Engl J Med. 2010;363(7):640-52.  Abhijit WASSERMAN et al. N Engl J Med. 2015;372(17):1598-607.    Allergies: Codeine and Other misc   /74   Pulse 70   Temp 36.3 °C (97.3 °F) (Temporal)   Resp 17   Ht 1.61 m (5' 3.39\")   Wt 70.4 kg (155 lb 3.3 oz)   SpO2 100%   BMI 27.16 kg/m²   Body surface area is 1.77 meters squared.    Labs 5/3/25  ANC~ 740 Plt = 257k   Hgb = 12.4     SCr = 0.62 mg/dL CrCl ~ >125 mL/min (minimum SCr of 0.7)   LFT's = WNLs  TBili = 0.2     11/21/24 ECHO LVEF 65%    Doxorubicin 25 mg/m2 x 1.77 m2 = 44.25 mg   <10% difference, okay to treat with final dose = 42 mg IV    Vinblastine 6 mg/m2 x 1.77 m2 = 10.62 mg   <10% difference, okay to treat with final dose = 10.5 mg IV    Dacarbazine 375 mg/m2 x 1.77 m2 = 663.75 mg   <10% difference, okay to treat with final dose = 656.3 mg IV    Chirag Bazan PharmD  "

## 2025-05-03 NOTE — PROGRESS NOTES
Chemotherapy Verification - PRIMARY RN      Height = 161 cm  Weight = 70.4 kg  BSA = 1.77 m2       Medication: Doxorubicin  Dose: 25 mg/m2  Calculated Dose: 44.25 mg                             (In mg/m2, AUC, mg/kg)     Medication: Vinblastine  Dose: 6 mg/m2  Calculated Dose: 10.62 mg                             (In mg/m2, AUC, mg/kg)    Medication: DTIC  Dose: 375 mg/m2  Calculated Dose: 663.75 mg                             (In mg/m2, AUC, mg/kg)      I confirm this process was performed independently with the BSA and all final chemotherapy dosing calculations congruent.  Any discrepancies of 10% or greater have been addressed with the chemotherapy pharmacist. The resolution of the discrepancy has been documented in the EPIC progress notes.

## 2025-05-03 NOTE — PROGRESS NOTES
"Pharmacy Chemotherapy Calculation:    Dx: Hodgkin's Lymphoma stage IIB       Protocol: AVD (DOXOrubicin/VinBLAStine/Dacarbazine)  *Dosing Reference*  DOXOrubicin 25 mg/m² IV push on Days 1 and 15   3/22/25 Dose reduced by 50% (12.5 mg/m2) for today C4D1 for elevated AST/ALT per Dr. Silver.  VinBLAStine 6 mg/m² IV over 5 - 10 minutes on Days 1 and 15   3/22/25 Dose reduced by 50% (3 mg/m2) for today C4D1 for elevated AST/ALT per Dr. Silver.  Dacarbazine 375 mg/m² IV over 30 minutes on Days 1 and 15  28-day cycle for 4 cycles conditionally following 2 cycles of ABVD or 2 cycles of BEACOPP based on Deauville score  NCCN Guidelines® for Hodgkin Lymphoma V.3.2024.   Clarence P, et al. N Engl J Med. 2016;374(25):2413-29.a    - 2/22/25: therapy changed from ABVD to AVD and to be continued x4 more cycles   Allergies:  Codeine and Other misc     /74   Pulse 70   Temp 36.3 °C (97.3 °F) (Temporal)   Resp 17   Ht 1.61 m (5' 3.39\")   Wt 70.4 kg (155 lb 3.3 oz)   SpO2 100%   BMI 27.16 kg/m²   Body surface area is 1.77 meters squared.      11/21/24 15:15   Left Ventrical Ejection Fraction 65     All labs (5/3/25) within treatment plan parameters.      Drug Order   (Drug name, dose, route, IV Fluid & volume, frequency, number of doses) Cycle 5, Day 15  Previous treatment: C5D1 on 4/19/25     Medication = DOXOrubicin  Base Dose = 25 mg/m2  Calc Dose: Base Dose x 1.77 m2 = 44.3 mg  Final Dose = 42 mg  Route = IV  Fluid & Volume = 21 mL empty bag  Admin Duration = Over 20 minutes          <10% difference, OK to treat with final dose   Medication = VinBLAStine  Base Dose = 6 mg/m2  Calc Dose:Base Dose x 1.77 m2 = 10.62 mg  Final Dose = 10.5 mg  Route = IV  Fluid & Volume = NS 25 mL  Admin Duration = Over 5-10 minutes          <10% difference, OK to treat with final dose   Medication = Dacarbazine  Base Dose = 375 mg/m2  Calc Dose: Base Dose x 1.77 m2 = 663.8 mg  Final Dose = 656.3 mg  Route = IV  Fluid & Volume =  " mL  Admin Duration = Over 30 minutes          <10% difference, OK to treat with final dose   By my signature below, I confirm this process was performed independently with the BSA and all final chemotherapy dosing calculations congruent. I have reviewed the above chemotherapy order and that my calculation of the final dose and BSA (when applicable) corroborate those calculations of the  pharmacist. Discrepancies of 10% or greater in the written dose have been addressed and documented within the EPIC Progress notes.    Renetta Rodriguez, PharmD

## 2025-05-13 RX ORDER — DIPHENHYDRAMINE HYDROCHLORIDE 50 MG/ML
50 INJECTION, SOLUTION INTRAMUSCULAR; INTRAVENOUS PRN
Status: CANCELLED | OUTPATIENT
Start: 2025-05-31

## 2025-05-13 RX ORDER — SODIUM CHLORIDE 9 MG/ML
INJECTION, SOLUTION INTRAVENOUS CONTINUOUS
Status: CANCELLED | OUTPATIENT
Start: 2025-05-31

## 2025-05-13 RX ORDER — 0.9 % SODIUM CHLORIDE 0.9 %
VIAL (ML) INJECTION PRN
Status: CANCELLED | OUTPATIENT
Start: 2025-05-31

## 2025-05-13 RX ORDER — ONDANSETRON 2 MG/ML
4 INJECTION INTRAMUSCULAR; INTRAVENOUS PRN
Status: CANCELLED | OUTPATIENT
Start: 2025-05-31

## 2025-05-13 RX ORDER — ACETAMINOPHEN 325 MG/1
650 TABLET ORAL ONCE
Status: CANCELLED | OUTPATIENT
Start: 2025-05-17 | End: 2025-05-17

## 2025-05-13 RX ORDER — 0.9 % SODIUM CHLORIDE 0.9 %
10 VIAL (ML) INJECTION PRN
Status: CANCELLED | OUTPATIENT
Start: 2025-05-16

## 2025-05-13 RX ORDER — 0.9 % SODIUM CHLORIDE 0.9 %
3 VIAL (ML) INJECTION PRN
Status: CANCELLED | OUTPATIENT
Start: 2025-05-31

## 2025-05-13 RX ORDER — ONDANSETRON 8 MG/1
8 TABLET, ORALLY DISINTEGRATING ORAL PRN
Status: CANCELLED | OUTPATIENT
Start: 2025-05-31

## 2025-05-13 RX ORDER — DEXAMETHASONE SODIUM PHOSPHATE 4 MG/ML
12 INJECTION, SOLUTION INTRA-ARTICULAR; INTRALESIONAL; INTRAMUSCULAR; INTRAVENOUS; SOFT TISSUE ONCE
Status: CANCELLED | OUTPATIENT
Start: 2025-05-17 | End: 2025-05-17

## 2025-05-13 RX ORDER — 0.9 % SODIUM CHLORIDE 0.9 %
10 VIAL (ML) INJECTION PRN
Status: CANCELLED | OUTPATIENT
Start: 2025-05-17

## 2025-05-13 RX ORDER — 0.9 % SODIUM CHLORIDE 0.9 %
3 VIAL (ML) INJECTION PRN
Status: CANCELLED | OUTPATIENT
Start: 2025-05-16

## 2025-05-13 RX ORDER — DIPHENHYDRAMINE HYDROCHLORIDE 50 MG/ML
50 INJECTION, SOLUTION INTRAMUSCULAR; INTRAVENOUS PRN
Status: CANCELLED | OUTPATIENT
Start: 2025-05-17

## 2025-05-13 RX ORDER — DEXAMETHASONE SODIUM PHOSPHATE 4 MG/ML
12 INJECTION, SOLUTION INTRA-ARTICULAR; INTRALESIONAL; INTRAMUSCULAR; INTRAVENOUS; SOFT TISSUE ONCE
Status: CANCELLED | OUTPATIENT
Start: 2025-05-31 | End: 2025-05-31

## 2025-05-13 RX ORDER — PALONOSETRON 0.05 MG/ML
0.25 INJECTION, SOLUTION INTRAVENOUS ONCE
Status: CANCELLED | OUTPATIENT
Start: 2025-05-17 | End: 2025-05-17

## 2025-05-13 RX ORDER — ONDANSETRON 2 MG/ML
4 INJECTION INTRAMUSCULAR; INTRAVENOUS PRN
Status: CANCELLED | OUTPATIENT
Start: 2025-05-17

## 2025-05-13 RX ORDER — 0.9 % SODIUM CHLORIDE 0.9 %
10 VIAL (ML) INJECTION PRN
Status: CANCELLED | OUTPATIENT
Start: 2025-05-31

## 2025-05-13 RX ORDER — METHYLPREDNISOLONE SODIUM SUCCINATE 125 MG/2ML
125 INJECTION, POWDER, LYOPHILIZED, FOR SOLUTION INTRAMUSCULAR; INTRAVENOUS PRN
Status: CANCELLED | OUTPATIENT
Start: 2025-05-17

## 2025-05-13 RX ORDER — 0.9 % SODIUM CHLORIDE 0.9 %
VIAL (ML) INJECTION PRN
Status: CANCELLED | OUTPATIENT
Start: 2025-05-16

## 2025-05-13 RX ORDER — ONDANSETRON 8 MG/1
8 TABLET, ORALLY DISINTEGRATING ORAL PRN
Status: CANCELLED | OUTPATIENT
Start: 2025-05-17

## 2025-05-13 RX ORDER — DIPHENHYDRAMINE HYDROCHLORIDE 50 MG/ML
25 INJECTION, SOLUTION INTRAMUSCULAR; INTRAVENOUS ONCE
Status: CANCELLED | OUTPATIENT
Start: 2025-05-31 | End: 2025-05-31

## 2025-05-13 RX ORDER — EPINEPHRINE 1 MG/ML(1)
0.5 AMPUL (ML) INJECTION PRN
Status: CANCELLED | OUTPATIENT
Start: 2025-05-17

## 2025-05-13 RX ORDER — METHYLPREDNISOLONE SODIUM SUCCINATE 125 MG/2ML
125 INJECTION, POWDER, LYOPHILIZED, FOR SOLUTION INTRAMUSCULAR; INTRAVENOUS PRN
Status: CANCELLED | OUTPATIENT
Start: 2025-05-31

## 2025-05-13 RX ORDER — EPINEPHRINE 1 MG/ML(1)
0.5 AMPUL (ML) INJECTION PRN
Status: CANCELLED | OUTPATIENT
Start: 2025-05-31

## 2025-05-13 RX ORDER — PROCHLORPERAZINE MALEATE 10 MG
10 TABLET ORAL EVERY 6 HOURS PRN
Status: CANCELLED | OUTPATIENT
Start: 2025-05-17

## 2025-05-13 RX ORDER — SODIUM CHLORIDE 9 MG/ML
INJECTION, SOLUTION INTRAVENOUS CONTINUOUS
Status: CANCELLED | OUTPATIENT
Start: 2025-05-17

## 2025-05-13 RX ORDER — PROCHLORPERAZINE MALEATE 10 MG
10 TABLET ORAL EVERY 6 HOURS PRN
Status: CANCELLED | OUTPATIENT
Start: 2025-05-31

## 2025-05-13 RX ORDER — 0.9 % SODIUM CHLORIDE 0.9 %
VIAL (ML) INJECTION PRN
Status: CANCELLED | OUTPATIENT
Start: 2025-05-17

## 2025-05-13 RX ORDER — 0.9 % SODIUM CHLORIDE 0.9 %
3 VIAL (ML) INJECTION PRN
Status: CANCELLED | OUTPATIENT
Start: 2025-05-17

## 2025-05-13 RX ORDER — ACETAMINOPHEN 325 MG/1
650 TABLET ORAL ONCE
Status: CANCELLED | OUTPATIENT
Start: 2025-05-31 | End: 2025-05-31

## 2025-05-13 RX ORDER — PALONOSETRON 0.05 MG/ML
0.25 INJECTION, SOLUTION INTRAVENOUS ONCE
Status: CANCELLED | OUTPATIENT
Start: 2025-05-31 | End: 2025-05-31

## 2025-05-13 RX ORDER — DIPHENHYDRAMINE HYDROCHLORIDE 50 MG/ML
25 INJECTION, SOLUTION INTRAMUSCULAR; INTRAVENOUS ONCE
Status: CANCELLED | OUTPATIENT
Start: 2025-05-17 | End: 2025-05-17

## 2025-05-16 ENCOUNTER — HOSPITAL ENCOUNTER (OUTPATIENT)
Dept: LAB | Facility: MEDICAL CENTER | Age: 25
End: 2025-05-16
Attending: STUDENT IN AN ORGANIZED HEALTH CARE EDUCATION/TRAINING PROGRAM
Payer: COMMERCIAL

## 2025-05-16 ENCOUNTER — TELEPHONE (OUTPATIENT)
Dept: HEMATOLOGY ONCOLOGY | Facility: MEDICAL CENTER | Age: 25
End: 2025-05-16
Payer: COMMERCIAL

## 2025-05-16 ENCOUNTER — HOSPITAL ENCOUNTER (OUTPATIENT)
Dept: HEMATOLOGY ONCOLOGY | Facility: MEDICAL CENTER | Age: 25
End: 2025-05-16
Attending: NURSE PRACTITIONER
Payer: COMMERCIAL

## 2025-05-16 VITALS
SYSTOLIC BLOOD PRESSURE: 102 MMHG | HEART RATE: 104 BPM | WEIGHT: 153.4 LBS | OXYGEN SATURATION: 99 % | TEMPERATURE: 98.7 F | DIASTOLIC BLOOD PRESSURE: 68 MMHG | RESPIRATION RATE: 14 BRPM | HEIGHT: 63 IN | BODY MASS INDEX: 27.18 KG/M2

## 2025-05-16 DIAGNOSIS — C81.72 OTHER CLASSICAL HODGKIN LYMPHOMA OF INTRATHORACIC LYMPH NODES (HCC): ICD-10-CM

## 2025-05-16 DIAGNOSIS — Z79.899 ENCOUNTER FOR LONG-TERM CURRENT USE OF HIGH RISK MEDICATION: ICD-10-CM

## 2025-05-16 LAB
ALBUMIN SERPL BCP-MCNC: 4.5 G/DL (ref 3.2–4.9)
ALBUMIN/GLOB SERPL: 1.8 G/DL
ALP SERPL-CCNC: 65 U/L (ref 30–99)
ALT SERPL-CCNC: 17 U/L (ref 2–50)
ANION GAP SERPL CALC-SCNC: 9 MMOL/L (ref 7–16)
AST SERPL-CCNC: 19 U/L (ref 12–45)
BASOPHILS # BLD AUTO: 0.8 % (ref 0–1.8)
BASOPHILS # BLD: 0.02 K/UL (ref 0–0.12)
BILIRUB SERPL-MCNC: 0.3 MG/DL (ref 0.1–1.5)
BUN SERPL-MCNC: 10 MG/DL (ref 8–22)
CALCIUM ALBUM COR SERPL-MCNC: 9 MG/DL (ref 8.5–10.5)
CALCIUM SERPL-MCNC: 9.4 MG/DL (ref 8.5–10.5)
CHLORIDE SERPL-SCNC: 106 MMOL/L (ref 96–112)
CO2 SERPL-SCNC: 24 MMOL/L (ref 20–33)
CREAT SERPL-MCNC: 0.83 MG/DL (ref 0.5–1.4)
EOSINOPHIL # BLD AUTO: 0.05 K/UL (ref 0–0.51)
EOSINOPHIL NFR BLD: 2.1 % (ref 0–6.9)
ERYTHROCYTE [DISTWIDTH] IN BLOOD BY AUTOMATED COUNT: 42.7 FL (ref 35.9–50)
GFR SERPLBLD CREATININE-BSD FMLA CKD-EPI: 100 ML/MIN/1.73 M 2
GLOBULIN SER CALC-MCNC: 2.5 G/DL (ref 1.9–3.5)
GLUCOSE SERPL-MCNC: 96 MG/DL (ref 65–99)
HCT VFR BLD AUTO: 41.5 % (ref 37–47)
HGB BLD-MCNC: 14 G/DL (ref 12–16)
IMM GRANULOCYTES # BLD AUTO: 0 K/UL (ref 0–0.11)
IMM GRANULOCYTES NFR BLD AUTO: 0 % (ref 0–0.9)
LDH SERPL L TO P-CCNC: 182 U/L (ref 107–266)
LYMPHOCYTES # BLD AUTO: 0.76 K/UL (ref 1–4.8)
LYMPHOCYTES NFR BLD: 31.9 % (ref 22–41)
MCH RBC QN AUTO: 29.8 PG (ref 27–33)
MCHC RBC AUTO-ENTMCNC: 33.7 G/DL (ref 32.2–35.5)
MCV RBC AUTO: 88.3 FL (ref 81.4–97.8)
MONOCYTES # BLD AUTO: 0.38 K/UL (ref 0–0.85)
MONOCYTES NFR BLD AUTO: 16 % (ref 0–13.4)
NEUTROPHILS # BLD AUTO: 1.17 K/UL (ref 1.82–7.42)
NEUTROPHILS NFR BLD: 49.2 % (ref 44–72)
NRBC # BLD AUTO: 0 K/UL
NRBC BLD-RTO: 0 /100 WBC (ref 0–0.2)
PLATELET # BLD AUTO: 295 K/UL (ref 164–446)
PMV BLD AUTO: 9.7 FL (ref 9–12.9)
POTASSIUM SERPL-SCNC: 4.8 MMOL/L (ref 3.6–5.5)
PROT SERPL-MCNC: 7 G/DL (ref 6–8.2)
RBC # BLD AUTO: 4.7 M/UL (ref 4.2–5.4)
SODIUM SERPL-SCNC: 139 MMOL/L (ref 135–145)
URATE SERPL-MCNC: 4.4 MG/DL (ref 1.9–8.2)
WBC # BLD AUTO: 2.4 K/UL (ref 4.8–10.8)

## 2025-05-16 PROCEDURE — 99212 OFFICE O/P EST SF 10 MIN: CPT | Performed by: NURSE PRACTITIONER

## 2025-05-16 PROCEDURE — 99214 OFFICE O/P EST MOD 30 MIN: CPT | Performed by: NURSE PRACTITIONER

## 2025-05-16 PROCEDURE — 84550 ASSAY OF BLOOD/URIC ACID: CPT

## 2025-05-16 PROCEDURE — 36415 COLL VENOUS BLD VENIPUNCTURE: CPT

## 2025-05-16 PROCEDURE — 80053 COMPREHEN METABOLIC PANEL: CPT

## 2025-05-16 PROCEDURE — 85025 COMPLETE CBC W/AUTO DIFF WBC: CPT

## 2025-05-16 PROCEDURE — 83615 LACTATE (LD) (LDH) ENZYME: CPT

## 2025-05-16 ASSESSMENT — ENCOUNTER SYMPTOMS
BLOOD IN STOOL: 0
SHORTNESS OF BREATH: 1
CONSTIPATION: 0
PALPITATIONS: 0
DIZZINESS: 0
DIAPHORESIS: 1
WHEEZING: 0
COUGH: 0
DIARRHEA: 0
VOMITING: 0
MYALGIAS: 0
FEVER: 0
WEIGHT LOSS: 1
NAUSEA: 0
HEADACHES: 0
TINGLING: 0
CHILLS: 0

## 2025-05-16 ASSESSMENT — FIBROSIS 4 INDEX: FIB4 SCORE: 0.45

## 2025-05-16 NOTE — TELEPHONE ENCOUNTER
Patient saw her lab results, and is wondering if she will be having her chemo tx tomorrow.    Please call her before end of today.

## 2025-05-16 NOTE — TELEPHONE ENCOUNTER
Returned call to pt following review by MALISSA Cabrera, of labs.  Informed patient she is within parameters to proceed with treatment.  Pt verbalized understanding and states she will be at her infusion appt.

## 2025-05-17 ENCOUNTER — OUTPATIENT INFUSION SERVICES (OUTPATIENT)
Dept: ONCOLOGY | Facility: MEDICAL CENTER | Age: 25
End: 2025-05-17
Attending: STUDENT IN AN ORGANIZED HEALTH CARE EDUCATION/TRAINING PROGRAM
Payer: MEDICAID

## 2025-05-17 VITALS
RESPIRATION RATE: 16 BRPM | OXYGEN SATURATION: 100 % | DIASTOLIC BLOOD PRESSURE: 78 MMHG | HEART RATE: 68 BPM | SYSTOLIC BLOOD PRESSURE: 132 MMHG | HEIGHT: 63 IN | WEIGHT: 152.78 LBS | TEMPERATURE: 97.4 F | BODY MASS INDEX: 27.07 KG/M2

## 2025-05-17 DIAGNOSIS — C81.72 OTHER CLASSICAL HODGKIN LYMPHOMA OF INTRATHORACIC LYMPH NODES (HCC): Primary | ICD-10-CM

## 2025-05-17 PROCEDURE — 700102 HCHG RX REV CODE 250 W/ 637 OVERRIDE(OP): Mod: UD | Performed by: NURSE PRACTITIONER

## 2025-05-17 PROCEDURE — 96413 CHEMO IV INFUSION 1 HR: CPT

## 2025-05-17 PROCEDURE — 96411 CHEMO IV PUSH ADDL DRUG: CPT

## 2025-05-17 PROCEDURE — 96417 CHEMO IV INFUS EACH ADDL SEQ: CPT

## 2025-05-17 PROCEDURE — A9270 NON-COVERED ITEM OR SERVICE: HCPCS | Mod: UD | Performed by: NURSE PRACTITIONER

## 2025-05-17 PROCEDURE — 700111 HCHG RX REV CODE 636 W/ 250 OVERRIDE (IP): Mod: UD | Performed by: NURSE PRACTITIONER

## 2025-05-17 PROCEDURE — 96375 TX/PRO/DX INJ NEW DRUG ADDON: CPT

## 2025-05-17 PROCEDURE — 96367 TX/PROPH/DG ADDL SEQ IV INF: CPT

## 2025-05-17 PROCEDURE — 700105 HCHG RX REV CODE 258: Mod: UD | Performed by: NURSE PRACTITIONER

## 2025-05-17 PROCEDURE — A4212 NON CORING NEEDLE OR STYLET: HCPCS

## 2025-05-17 RX ORDER — DIPHENHYDRAMINE HYDROCHLORIDE 50 MG/ML
25 INJECTION, SOLUTION INTRAMUSCULAR; INTRAVENOUS ONCE
Status: COMPLETED | OUTPATIENT
Start: 2025-05-17 | End: 2025-05-17

## 2025-05-17 RX ORDER — PALONOSETRON 0.05 MG/ML
0.25 INJECTION, SOLUTION INTRAVENOUS ONCE
Status: COMPLETED | OUTPATIENT
Start: 2025-05-17 | End: 2025-05-17

## 2025-05-17 RX ORDER — ACETAMINOPHEN 325 MG/1
650 TABLET ORAL ONCE
Status: COMPLETED | OUTPATIENT
Start: 2025-05-17 | End: 2025-05-17

## 2025-05-17 RX ORDER — DEXAMETHASONE SODIUM PHOSPHATE 4 MG/ML
12 INJECTION, SOLUTION INTRA-ARTICULAR; INTRALESIONAL; INTRAMUSCULAR; INTRAVENOUS; SOFT TISSUE ONCE
Status: COMPLETED | OUTPATIENT
Start: 2025-05-17 | End: 2025-05-17

## 2025-05-17 RX ADMIN — VINBLASTINE SULFATE 10.5 MG: 1 INJECTION INTRAVENOUS at 09:58

## 2025-05-17 RX ADMIN — SODIUM CHLORIDE 656.3 MG: 9 INJECTION, SOLUTION INTRAVENOUS at 10:20

## 2025-05-17 RX ADMIN — ACETAMINOPHEN 650 MG: 325 TABLET ORAL at 08:22

## 2025-05-17 RX ADMIN — DEXAMETHASONE SODIUM PHOSPHATE 12 MG: 4 INJECTION INTRA-ARTICULAR; INTRALESIONAL; INTRAMUSCULAR; INTRAVENOUS; SOFT TISSUE at 08:22

## 2025-05-17 RX ADMIN — PALONOSETRON HYDROCHLORIDE 0.25 MG: 0.25 INJECTION INTRAVENOUS at 08:27

## 2025-05-17 RX ADMIN — FOSAPREPITANT 150 MG: 150 INJECTION, POWDER, LYOPHILIZED, FOR SOLUTION INTRAVENOUS at 08:33

## 2025-05-17 RX ADMIN — DOXORUBICIN HYDROCHLORIDE 42 MG: 2 INJECTION, SOLUTION INTRAVENOUS at 09:20

## 2025-05-17 RX ADMIN — DIPHENHYDRAMINE HYDROCHLORIDE 25 MG: 50 INJECTION INTRAMUSCULAR; INTRAVENOUS at 08:31

## 2025-05-17 ASSESSMENT — FIBROSIS 4 INDEX: FIB4 SCORE: 0.39

## 2025-05-17 NOTE — PROGRESS NOTES
"Pharmacy Chemotherapy Calculation:    Dx: Hodgkin's Lymphoma stage IIB       Protocol: AVD (DOXOrubicin/VinBLAStine/Dacarbazine)  *Dosing Reference*  DOXOrubicin 25 mg/m² IV push on Days 1 and 15   3/22/25 Dose reduced by 50% (12.5 mg/m2) for today C4D1 for elevated AST/ALT per Dr. Silver.  VinBLAStine 6 mg/m² IV over 5 - 10 minutes on Days 1 and 15   3/22/25 Dose reduced by 50% (3 mg/m2) for today C4D1 for elevated AST/ALT per Dr. Silver.  Dacarbazine 375 mg/m² IV over 30 minutes on Days 1 and 15  28-day cycle for 4 cycles conditionally following 2 cycles of ABVD or 2 cycles of BEACOPP based on Deauville score  NCCN Guidelines® for Hodgkin Lymphoma V.3.2024.   Clarence P, et al. N Engl J Med. 2016;374(25):241-29.a    - 2/22/25: therapy changed from ABVD to AVD and to be continued x4 more cycles     Allergies:  Codeine and Other misc     /78   Pulse 68   Temp 36.3 °C (97.4 °F) (Temporal)   Resp 16   Ht 1.61 m (5' 3.39\")   Wt 69.3 kg (152 lb 12.5 oz)   SpO2 100%   BMI 26.73 kg/m²   Body surface area is 1.76 meters squared.      11/21/24 15:15   Left Ventrical Ejection Fraction 65     Labs 5/16/25  ANC~ 1170 Plt = 295k   Hgb = 14     SCr = 0.83 mg/dL CrCl ~ 112.7 mL/min   LFT's = WNLs  TBili = 0.3     Drug Order   (Drug name, dose, route, IV Fluid & volume, frequency, number of doses) Cycle 6, Day 1  Previous treatment: C5D15 on 5/3/25     Medication = DOXOrubicin  Base Dose = 25 mg/m2  Calc Dose: Base Dose x 1.76 m2 = 44 mg  Final Dose = 42 mg  Route = IV  Fluid & Volume = 21 mL empty bag  Admin Duration = Over 20 minutes          <10% difference, OK to treat with final dose   Medication = VinBLAStine  Base Dose = 6 mg/m2  Calc Dose:Base Dose x 1.76 m2 = 10.56 mg  Final Dose = 10.5 mg  Route = IV  Fluid & Volume = NS 25 mL  Admin Duration = Over 5-10 minutes          <10% difference, OK to treat with final dose   Medication = Dacarbazine  Base Dose = 375 mg/m2  Calc Dose: Base Dose x 1.76 m2 = 660 " mg  Final Dose = 656.3 mg  Route = IV  Fluid & Volume =  mL  Admin Duration = Over 30 minutes          <10% difference, OK to treat with final dose   By my signature below, I confirm this process was performed independently with the BSA and all final chemotherapy dosing calculations congruent. I have reviewed the above chemotherapy order and that my calculation of the final dose and BSA (when applicable) corroborate those calculations of the  pharmacist. Discrepancies of 10% or greater in the written dose have been addressed and documented within the EPIC Progress notes.    Chirag Bazan, PharmD   stated

## 2025-05-17 NOTE — PROGRESS NOTES
Chemotherapy Verification - SECONDARY RN       Height = 161 cm  Weight = 69.3 kg  BSA = 1.76 m2       Medication: Doxorubicin  Dose: 25 mg/m2  Calculated Dose: 44 mg                             (In mg/m2, AUC, mg/kg)     Medication: Vinblastine  Dose: 6 mg/m2  Calculated Dose: 10.56 mg                             (In mg/m2, AUC, mg/kg)    Medication: DTIC  Dose: 375 mg/m2  Calculated Dose: 660 mg                             (In mg/m2, AUC, mg/kg)      I confirm that this process was performed independently.

## 2025-05-17 NOTE — PROGRESS NOTES
Pt arrived ambulatory to [facility] for D1C5 AVD treatment for Hodgkin lymphoma. POC discussed with pt and she agrees with plan.     Lab results reviewed from 5/16/2025, ok to proceed with treatment. Port accessed in sterile fashion, brisk blood return noted. Pt medicated per MAR.    Pre-meds given.   Port with brisk blood return, flushed with NS.  DOXOrubicin infused.   vinBLAStine infused.  Dacarbazine infused.    Pt tolerated treatment without s/s adverse reaction. Port with brisk blood return, flushed with NS using pulsatile method then saline locked per policy. Port de-accessed, gauze dressing applied.     Pt discharged to self care, [facility]. Pt's next appointment confirmed 5/31/2025.

## 2025-05-17 NOTE — PROGRESS NOTES
Chemotherapy Verification - PRIMARY RN    D1C6    Height = 161cm  Weight = 69.3kg  BSA = 1.76m^2       Medication: DOXOrubicin  Dose: 25mg/m^2  Calculated Dose: 44 mg                                 Medication: vinBLAStine  Dose: 6mg/m^2  Calculated Dose: 10.56 mg                                 Medication: dacarbazine  Dose: 375mg/m^2  Calculated Dose: 660 mg                                 I confirm this process was performed independently with the BSA and all final chemotherapy dosing calculations congruent.  Any discrepancies of 10% or greater have been addressed with the chemotherapy pharmacist. The resolution of the discrepancy has been documented in the EPIC progress notes.

## 2025-05-17 NOTE — PROGRESS NOTES
"Pharmacy Chemotherapy calculation    DX: Hodgkin Lymphoma    Cycle 6, Day 1  Previous treatment = C5D15 on 5/3/25    Regimen:  --> de-escalate to AVD   *Dosing Reference*  Doxorubicin 25 mg/m2 IV push on Days 1 and 15    - 3/22/25 C4D1: reduce dose 50% for elevated LFTs today per Dr Silver   - 2/22/25: removed from treatment plan per MD  Vinblastine 6 mg/m2 IV over 5-10 minutes on Days 1 and 15    - 3/22/25 C4D1: reduce dose 50% for elevated LFTs today per Dr Silver  Dacarbazine 375 mg/m2 IV over 30 minutes on Days 1 and 15  Primary treatment: 28-day cycle for 2 cycles conditionally followed by   Additional therapy: Based on Deauville score: 2 cycles combined modality   - 2/22/25: therapy changed to AVD and to be continued x4 more cycles  NCCN Guidelines for Hodgkin Lymphoma V.3.2024.  Demetria AUGUSTE, et al. N Engl J Med. 2010;363(7):640-52.  Abhijit WASSERMAN et al. N Engl J Med. 2015;372(17):1598-607.    Allergies: Codeine and Other misc   /78   Pulse 68   Temp 36.3 °C (97.4 °F) (Temporal)   Resp 16   Ht 1.61 m (5' 3.39\")   Wt 69.3 kg (152 lb 12.5 oz)   SpO2 100%   BMI 26.73 kg/m²   Body surface area is 1.76 meters squared.    ECHO:  11/21/24 LVEF 65%    All labs (5/16/25) within treatment plan parameters.      Doxorubicin 25 mg/m2 x 1.76 m2 = 44 mg   <10% difference, okay to treat with final dose = 42 mg IV    Vinblastine 6 mg/m2 x 1.76 m2 = 10.6 mg   <10% difference, okay to treat with final dose = 10.5 mg IV    Dacarbazine 375 mg/m2 x 1.76 m2 = 660 mg   <10% difference, okay to treat with final dose = 656.3 mg IV      Renetta Rodriguez, PharmD  "

## 2025-05-19 ASSESSMENT — ENCOUNTER SYMPTOMS: INSOMNIA: 0

## 2025-05-29 NOTE — PROGRESS NOTES
"Pharmacy Chemotherapy Calculation:    Dx: Hodgkin's Lymphoma stage IIB       Protocol: AVD (DOXOrubicin/VinBLAStine/Dacarbazine)  *Dosing Reference*  DOXOrubicin 25 mg/m² IV push on Days 1 and 15   3/22/25 Dose reduced by 50% (12.5 mg/m2) for today C4D1 for elevated AST/ALT per Dr. Silver.  VinBLAStine 6 mg/m² IV over 5 - 10 minutes on Days 1 and 15   3/22/25 Dose reduced by 50% (3 mg/m2) for today C4D1 for elevated AST/ALT per Dr. Silver.  Dacarbazine 375 mg/m² IV over 30 minutes on Days 1 and 15  28-day cycle for 4 cycles conditionally following 2 cycles of ABVD or 2 cycles of BEACOPP based on Deauville score  NCCN Guidelines® for Hodgkin Lymphoma V.3.2024.   Clarence P, et al. N Engl J Med. 2016;374(25):2412-29.a    - 2/22/25: therapy changed from ABVD to AVD and to be continued x4 more cycles     Allergies:  Codeine and Other misc     /77   Pulse 82   Temp 36.4 °C (97.6 °F) (Temporal)   Resp 17   Ht 1.61 m (5' 3.39\")   Wt 68.9 kg (151 lb 14.4 oz)   SpO2 99%   BMI 26.58 kg/m²   Body surface area is 1.76 meters squared.      11/21/24 15:15   Left Ventrical Ejection Fraction 65     All labs (5/30/25) within treatment plan parameters.      Drug Order   (Drug name, dose, route, IV Fluid & volume, frequency, number of doses) Cycle 6, Day 15  Previous treatment: C6D1 on 5/17/25     Medication = DOXOrubicin  Base Dose = 25 mg/m2  Calc Dose: Base Dose x 1.76 m2 = 44 mg  Final Dose = 42 mg  Route = IV  Fluid & Volume = 21 mL empty bag  Admin Duration = Over 20 minutes          <10% difference, OK to treat with final dose   Medication = VinBLAStine  Base Dose = 6 mg/m2  Calc Dose:Base Dose x 1.76 m2 = 10.6 mg  Final Dose = 10.5 mg  Route = IV  Fluid & Volume = NS 25 mL  Admin Duration = Over 5-10 minutes          <10% difference, OK to treat with final dose   Medication = Dacarbazine  Base Dose = 375 mg/m2  Calc Dose: Base Dose x 1.76 m2 = 660 mg  Final Dose = 656.3 mg  Route = IV  Fluid & Volume =  " mL  Admin Duration = Over 30 minutes          <10% difference, OK to treat with final dose   By my signature below, I confirm this process was performed independently with the BSA and all final chemotherapy dosing calculations congruent. I have reviewed the above chemotherapy order and that my calculation of the final dose and BSA (when applicable) corroborate those calculations of the  pharmacist. Discrepancies of 10% or greater in the written dose have been addressed and documented within the EPIC Progress notes.    Renetta Rodriguez, PharmD

## 2025-05-30 ENCOUNTER — HOSPITAL ENCOUNTER (OUTPATIENT)
Dept: LAB | Facility: MEDICAL CENTER | Age: 25
End: 2025-05-30
Attending: STUDENT IN AN ORGANIZED HEALTH CARE EDUCATION/TRAINING PROGRAM
Payer: COMMERCIAL

## 2025-05-30 DIAGNOSIS — Z79.899 ENCOUNTER FOR LONG-TERM (CURRENT) USE OF HIGH-RISK MEDICATION: ICD-10-CM

## 2025-05-30 DIAGNOSIS — C81.72 OTHER CLASSICAL HODGKIN LYMPHOMA OF INTRATHORACIC LYMPH NODES (HCC): ICD-10-CM

## 2025-05-30 LAB
ALBUMIN SERPL BCP-MCNC: 4.6 G/DL (ref 3.2–4.9)
ALBUMIN/GLOB SERPL: 1.9 G/DL
ALP SERPL-CCNC: 67 U/L (ref 30–99)
ALT SERPL-CCNC: 16 U/L (ref 2–50)
ANION GAP SERPL CALC-SCNC: 10 MMOL/L (ref 7–16)
AST SERPL-CCNC: 20 U/L (ref 12–45)
BASOPHILS # BLD AUTO: 1 % (ref 0–1.8)
BASOPHILS # BLD: 0.03 K/UL (ref 0–0.12)
BILIRUB SERPL-MCNC: 0.3 MG/DL (ref 0.1–1.5)
BUN SERPL-MCNC: 9 MG/DL (ref 8–22)
CALCIUM ALBUM COR SERPL-MCNC: 9 MG/DL (ref 8.5–10.5)
CALCIUM SERPL-MCNC: 9.5 MG/DL (ref 8.5–10.5)
CHLORIDE SERPL-SCNC: 108 MMOL/L (ref 96–112)
CO2 SERPL-SCNC: 22 MMOL/L (ref 20–33)
CREAT SERPL-MCNC: 0.76 MG/DL (ref 0.5–1.4)
EOSINOPHIL # BLD AUTO: 0.09 K/UL (ref 0–0.51)
EOSINOPHIL NFR BLD: 3 % (ref 0–6.9)
ERYTHROCYTE [DISTWIDTH] IN BLOOD BY AUTOMATED COUNT: 40.9 FL (ref 35.9–50)
GFR SERPLBLD CREATININE-BSD FMLA CKD-EPI: 111 ML/MIN/1.73 M 2
GLOBULIN SER CALC-MCNC: 2.4 G/DL (ref 1.9–3.5)
GLUCOSE SERPL-MCNC: 101 MG/DL (ref 65–99)
HCT VFR BLD AUTO: 41.4 % (ref 37–47)
HGB BLD-MCNC: 13.9 G/DL (ref 12–16)
IMM GRANULOCYTES # BLD AUTO: 0.01 K/UL (ref 0–0.11)
IMM GRANULOCYTES NFR BLD AUTO: 0.3 % (ref 0–0.9)
LYMPHOCYTES # BLD AUTO: 0.9 K/UL (ref 1–4.8)
LYMPHOCYTES NFR BLD: 30 % (ref 22–41)
MCH RBC QN AUTO: 29.4 PG (ref 27–33)
MCHC RBC AUTO-ENTMCNC: 33.6 G/DL (ref 32.2–35.5)
MCV RBC AUTO: 87.7 FL (ref 81.4–97.8)
MONOCYTES # BLD AUTO: 0.4 K/UL (ref 0–0.85)
MONOCYTES NFR BLD AUTO: 13.3 % (ref 0–13.4)
NEUTROPHILS # BLD AUTO: 1.57 K/UL (ref 1.82–7.42)
NEUTROPHILS NFR BLD: 52.4 % (ref 44–72)
NRBC # BLD AUTO: 0 K/UL
NRBC BLD-RTO: 0 /100 WBC (ref 0–0.2)
PLATELET # BLD AUTO: 315 K/UL (ref 164–446)
PMV BLD AUTO: 9.4 FL (ref 9–12.9)
POTASSIUM SERPL-SCNC: 4.9 MMOL/L (ref 3.6–5.5)
PROT SERPL-MCNC: 7 G/DL (ref 6–8.2)
RBC # BLD AUTO: 4.72 M/UL (ref 4.2–5.4)
SODIUM SERPL-SCNC: 140 MMOL/L (ref 135–145)
WBC # BLD AUTO: 3 K/UL (ref 4.8–10.8)

## 2025-05-30 PROCEDURE — 36415 COLL VENOUS BLD VENIPUNCTURE: CPT

## 2025-05-30 PROCEDURE — 80053 COMPREHEN METABOLIC PANEL: CPT

## 2025-05-30 PROCEDURE — 85025 COMPLETE CBC W/AUTO DIFF WBC: CPT

## 2025-05-31 ENCOUNTER — OUTPATIENT INFUSION SERVICES (OUTPATIENT)
Dept: ONCOLOGY | Facility: MEDICAL CENTER | Age: 25
End: 2025-05-31
Attending: STUDENT IN AN ORGANIZED HEALTH CARE EDUCATION/TRAINING PROGRAM
Payer: MEDICAID

## 2025-05-31 VITALS
BODY MASS INDEX: 26.91 KG/M2 | DIASTOLIC BLOOD PRESSURE: 77 MMHG | WEIGHT: 151.9 LBS | TEMPERATURE: 97.6 F | SYSTOLIC BLOOD PRESSURE: 124 MMHG | OXYGEN SATURATION: 99 % | HEART RATE: 82 BPM | HEIGHT: 63 IN | RESPIRATION RATE: 17 BRPM

## 2025-05-31 DIAGNOSIS — C81.72 OTHER CLASSICAL HODGKIN LYMPHOMA OF INTRATHORACIC LYMPH NODES (HCC): Primary | ICD-10-CM

## 2025-05-31 PROCEDURE — A4212 NON CORING NEEDLE OR STYLET: HCPCS

## 2025-05-31 PROCEDURE — 700111 HCHG RX REV CODE 636 W/ 250 OVERRIDE (IP): Mod: UD | Performed by: NURSE PRACTITIONER

## 2025-05-31 PROCEDURE — 96413 CHEMO IV INFUSION 1 HR: CPT

## 2025-05-31 PROCEDURE — A9270 NON-COVERED ITEM OR SERVICE: HCPCS | Mod: UD | Performed by: NURSE PRACTITIONER

## 2025-05-31 PROCEDURE — 96411 CHEMO IV PUSH ADDL DRUG: CPT

## 2025-05-31 PROCEDURE — 96417 CHEMO IV INFUS EACH ADDL SEQ: CPT

## 2025-05-31 PROCEDURE — 700102 HCHG RX REV CODE 250 W/ 637 OVERRIDE(OP): Mod: UD | Performed by: NURSE PRACTITIONER

## 2025-05-31 PROCEDURE — 700105 HCHG RX REV CODE 258: Mod: UD | Performed by: NURSE PRACTITIONER

## 2025-05-31 PROCEDURE — 96375 TX/PRO/DX INJ NEW DRUG ADDON: CPT

## 2025-05-31 PROCEDURE — 96367 TX/PROPH/DG ADDL SEQ IV INF: CPT

## 2025-05-31 RX ORDER — DIPHENHYDRAMINE HYDROCHLORIDE 50 MG/ML
25 INJECTION, SOLUTION INTRAMUSCULAR; INTRAVENOUS ONCE
Status: COMPLETED | OUTPATIENT
Start: 2025-05-31 | End: 2025-05-31

## 2025-05-31 RX ORDER — ACETAMINOPHEN 325 MG/1
650 TABLET ORAL ONCE
Status: COMPLETED | OUTPATIENT
Start: 2025-05-31 | End: 2025-05-31

## 2025-05-31 RX ORDER — PALONOSETRON 0.05 MG/ML
0.25 INJECTION, SOLUTION INTRAVENOUS ONCE
Status: COMPLETED | OUTPATIENT
Start: 2025-05-31 | End: 2025-05-31

## 2025-05-31 RX ADMIN — FOSAPREPITANT 150 MG: 150 INJECTION, POWDER, LYOPHILIZED, FOR SOLUTION INTRAVENOUS at 08:05

## 2025-05-31 RX ADMIN — DEXAMETHASONE SODIUM PHOSPHATE 12 MG: 4 INJECTION, SOLUTION INTRA-ARTICULAR; INTRALESIONAL; INTRAMUSCULAR; INTRAVENOUS; SOFT TISSUE at 07:56

## 2025-05-31 RX ADMIN — PALONOSETRON HYDROCHLORIDE 0.25 MG: 0.25 INJECTION INTRAVENOUS at 07:53

## 2025-05-31 RX ADMIN — VINBLASTINE SULFATE 10.5 MG: 1 INJECTION INTRAVENOUS at 09:38

## 2025-05-31 RX ADMIN — SODIUM CHLORIDE 656.3 MG: 9 INJECTION, SOLUTION INTRAVENOUS at 09:54

## 2025-05-31 RX ADMIN — DOXORUBICIN HYDROCHLORIDE 42 MG: 2 INJECTION, SOLUTION INTRAVENOUS at 08:50

## 2025-05-31 RX ADMIN — DIPHENHYDRAMINE HYDROCHLORIDE 25 MG: 50 INJECTION INTRAMUSCULAR; INTRAVENOUS at 07:53

## 2025-05-31 RX ADMIN — ACETAMINOPHEN 650 MG: 325 TABLET ORAL at 07:52

## 2025-05-31 ASSESSMENT — FIBROSIS 4 INDEX: FIB4 SCORE: 0.4

## 2025-05-31 NOTE — PROGRESS NOTES
"Pharmacy Chemotherapy calculation    DX: Hodgkin Lymphoma    Cycle 6, Day 15  Previous treatment = C6D1 on 5/17/25    Regimen:  --> de-escalate to AVD   *Dosing Reference*  Doxorubicin 25 mg/m2 IV push on Days 1 and 15    - 3/22/25 C4D1: reduce dose 50% for elevated LFTs today per Dr Silver   - 2/22/25: removed from treatment plan per MD  Vinblastine 6 mg/m2 IV over 5-10 minutes on Days 1 and 15    - 3/22/25 C4D1: reduce dose 50% for elevated LFTs today per Dr Silver  Dacarbazine 375 mg/m2 IV over 30 minutes on Days 1 and 15  Primary treatment: 28-day cycle for 2 cycles conditionally followed by   Additional therapy: Based on Deauville score: 2 cycles combined modality   - 2/22/25: therapy changed to AVD and to be continued x4 more cycles  NCCN Guidelines for Hodgkin Lymphoma V.3.2024.  Demetria AUGUSTE, et al. N Engl J Med. 2010;363(7):640-52.  Abhijit WASSERMAN et al. N Engl J Med. 2015;372(17):1598-607.    Allergies: Codeine and Other misc   /77   Pulse 82   Temp 36.4 °C (97.6 °F) (Temporal)   Resp 17   Ht 1.61 m (5' 3.39\")   Wt 68.9 kg (151 lb 14.4 oz)   SpO2 99%   BMI 26.58 kg/m²   Body surface area is 1.76 meters squared.    ECHO:  11/21/24 LVEF 65%    Labs 5/30/25  ANC~ 1570 Plt = 315k   Hgb = 13.9     SCr = 0.76 mg/dL CrCl ~ 122.4 mL/min   LFT's = WNLs  TBili = 0.3     Doxorubicin 25 mg/m2 x 1.76 m2 = 44 mg   <10% difference, okay to treat with final dose = 42 mg IV    Vinblastine 6 mg/m2 x 1.76 m2 = 10.56 mg   <10% difference, okay to treat with final dose = 10.5 mg IV    Dacarbazine 375 mg/m2 x 1.76 m2 = 660 mg   <10% difference, okay to treat with final dose = 656.3 mg IV    Chirag Bazan, PharmD  "

## 2025-05-31 NOTE — PROGRESS NOTES
Chemotherapy Verification - SECONDARY RN       Height = 1.61m  Weight = 68.9kg  BSA = 1.76m2       Medication: DOXOrubin (Adriamycin)  Dose: 25mg/m2  Calculated Dose: 44mg                             (In mg/m2, AUC, mg/kg)     Medication: vinBLAStine (Velban)  Dose: 6mg/m2  Calculated Dose: 10.56mg                             (In mg/m2, AUC, mg/kg)    Medication: dacarbazine (Dtic)  Dose: 375mg/m2  Calculated Dose: 660mg                             (In mg/m2, AUC, mg/kg)        I confirm that this process was performed independently.

## 2025-05-31 NOTE — PROGRESS NOTES
Chemotherapy Verification - PRIMARY RN  C6 D15    Height = 161 cm  Weight = 68.9 kg  BSA = 1.76 m^2       Medication: doxorubicin (ADRIAMYCIN)  Dose: 25 mg/m2  Calculated Dose: 44 mg                             (In mg/m2, AUC, mg/kg)     Medication: vinblastine (VELBAN)  Dose: 6 mg/m2  Calculated Dose: 10.56 mg                             (In mg/m2, AUC, mg/kg)    Medication: dacarbazine (DTIC)  Dose: 375 mg/m2  Calculated Dose: 660 mg                             (In mg/m2, AUC, mg/kg)      I confirm this process was performed independently with the BSA and all final chemotherapy dosing calculations congruent.  Any discrepancies of 10% or greater have been addressed with the chemotherapy pharmacist. The resolution of the discrepancy has been documented in the EPIC progress notes.

## 2025-05-31 NOTE — PROGRESS NOTES
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 Patient denies acute health concerns. Denies s/s active infections, open wounds, and mouth sores. Right chest port accessed in sterile fashion with a 20g 3/4\" singh needle. Port flushes easily and has brisk blood return. Labs drawn on 5/30 reviewed and meets parameters to proceed with treatment. Premedications administered. Doxorubicin, Vinblastine, then DTIC all infused without adverse s/s. Patient tolerated chemotherapies without issues. Port with brisk blood return post-chemo, flushed with 20 ml NS, and de-accessed. Gauze and paper tape applied over site. Patient to follow up with Dr. Silver end of June. Emailed scheduling for port flush appointments until port can be removed. Discharged home to self care in no apparent distress.   "

## 2025-06-01 RX ORDER — 0.9 % SODIUM CHLORIDE 0.9 %
VIAL (ML) INJECTION PRN
OUTPATIENT
Start: 2025-06-02

## 2025-06-13 ENCOUNTER — HOSPITAL ENCOUNTER (OUTPATIENT)
Dept: RADIOLOGY | Facility: MEDICAL CENTER | Age: 25
End: 2025-06-13
Attending: NURSE PRACTITIONER
Payer: COMMERCIAL

## 2025-06-13 DIAGNOSIS — C81.72 OTHER CLASSICAL HODGKIN LYMPHOMA OF INTRATHORACIC LYMPH NODES (HCC): ICD-10-CM

## 2025-06-13 LAB — GLUCOSE BLD-MCNC: 85 MG/DL (ref 65–99)

## 2025-06-13 PROCEDURE — A9552 F18 FDG: HCPCS

## 2025-06-26 ENCOUNTER — HOSPITAL ENCOUNTER (OUTPATIENT)
Dept: HEMATOLOGY ONCOLOGY | Facility: MEDICAL CENTER | Age: 25
End: 2025-06-26
Attending: STUDENT IN AN ORGANIZED HEALTH CARE EDUCATION/TRAINING PROGRAM
Payer: COMMERCIAL

## 2025-06-26 VITALS
BODY MASS INDEX: 26.05 KG/M2 | HEIGHT: 63 IN | OXYGEN SATURATION: 97 % | DIASTOLIC BLOOD PRESSURE: 64 MMHG | HEART RATE: 60 BPM | WEIGHT: 147 LBS | SYSTOLIC BLOOD PRESSURE: 126 MMHG | TEMPERATURE: 97.3 F

## 2025-06-26 DIAGNOSIS — C81.72 OTHER CLASSICAL HODGKIN LYMPHOMA OF INTRATHORACIC LYMPH NODES (HCC): Primary | ICD-10-CM

## 2025-06-26 PROCEDURE — 99213 OFFICE O/P EST LOW 20 MIN: CPT | Performed by: STUDENT IN AN ORGANIZED HEALTH CARE EDUCATION/TRAINING PROGRAM

## 2025-06-26 PROCEDURE — 99212 OFFICE O/P EST SF 10 MIN: CPT | Performed by: STUDENT IN AN ORGANIZED HEALTH CARE EDUCATION/TRAINING PROGRAM

## 2025-06-26 ASSESSMENT — ENCOUNTER SYMPTOMS
DIZZINESS: 0
TINGLING: 0
NERVOUS/ANXIOUS: 0
WEIGHT LOSS: 0
DIAPHORESIS: 0
BLURRED VISION: 0
ORTHOPNEA: 0
INSOMNIA: 0
MYALGIAS: 0
DOUBLE VISION: 0
SINUS PAIN: 0
HEARTBURN: 0
VOMITING: 0
HEADACHES: 0
WEAKNESS: 0
ABDOMINAL PAIN: 0
CHILLS: 0
SHORTNESS OF BREATH: 0
SORE THROAT: 0
NAUSEA: 0
BRUISES/BLEEDS EASILY: 0
SPUTUM PRODUCTION: 0
COUGH: 0
PALPITATIONS: 0
WHEEZING: 0
FEVER: 0
BACK PAIN: 0
CONSTIPATION: 0
DIARRHEA: 0
BLOOD IN STOOL: 0

## 2025-06-26 ASSESSMENT — FIBROSIS 4 INDEX: FIB4 SCORE: 0.4

## 2025-06-26 NOTE — PROGRESS NOTES
Follow Up Note:  Hematology/Oncology      Primary Care:  Margie Rhodes P.A.-C.    Diagnosis: Classical Hodgkin's lymphoma, stage IIB    Chief Complaint: On-treatment visit    Current Treatment: AVD x 4 more cycles    Prior Treatment: ABVD x 2 cycles    Oncology History of Presenting Illness:  Albert Gillette is a 24 y.o. [place] woman who presents to the clinic for evaluation for systemic therapy for a new diagnosis of classical Hodgkin's lymphoma. She noticed a lump in her neck at the beginning of August and ultimately was referred to our intake oncology coordinator after US showed lymphadenopathy in the left neck. She had it monitored and they did not change in size at all, but did protrude with certain movements. She also reports having significant night sweats and fatigue over the past several months. She was sent for a CT scan of the soft tissue/neck which revealed more nodes in the anterior mediastinum. She ultimately went through a core biopsy which revealed classical Hodgkin's lymphoma. She underwent a PET scan which revealed stage IIA disease. She presents for evaluation accordingly.     Treatment History:   12/27/24: C1D1 ABVD  01/25/25: C2D1 ABVD *delayed 1 day due to test at school (planned)  02/22/25: C3 AVD   03/22/25: C4 AVD  04/19/25: C5 AVD   05/17/25: C6 AVD    Interval History:  Patient is here for follow up visit. She is doing well but still feels a bit fatigued. She otherwise is glad to be done with chemotherapy and wants her port out.     Allergies as of 06/26/2025 - Reviewed 06/26/2025   Allergen Reaction Noted    Codeine  07/24/2024    Other misc  12/06/2024         Current Outpatient Medications:     propranolol (INDERAL) 10 MG Tab, Take 1 Tablet by mouth 2 times a day as needed (Situational anxiety)., Disp: 60 Tablet, Rfl: 1    lidocaine-prilocaine (EMLA) 2.5-2.5 % Cream, Apply to port 1 hour prior to access of port and cover with plastic wrap., Disp: 1 g, Rfl: 3     "ondansetron (ZOFRAN) 4 MG Tab tablet, Take 1 Tablet by mouth every four hours as needed for Nausea/Vomiting (for nausea, vomiting)., Disp: 30 Tablet, Rfl: 6    prochlorperazine (COMPAZINE) 10 MG Tab, Take 1 Tablet by mouth every 6 hours as needed (for nausea, vomiting)., Disp: 30 Tablet, Rfl: 6    Levonorgestrel (MIRENA, 52 MG, IU), by Intrauterine route. MG unknown, Disp: , Rfl:     OLANZapine (ZYPREXA) 5 MG Tab, Take 1 Tablet by mouth every evening. (Patient not taking: Reported on 5/16/2025), Disp: 30 Tablet, Rfl: 6      Review of Systems:  Review of Systems   Constitutional:  Positive for malaise/fatigue (Occasional). Negative for chills, diaphoresis, fever and weight loss.   HENT:  Negative for hearing loss, nosebleeds, sinus pain and sore throat.    Eyes:  Negative for blurred vision and double vision.   Respiratory:  Negative for cough, sputum production, shortness of breath and wheezing.    Cardiovascular:  Negative for chest pain, palpitations, orthopnea and leg swelling.   Gastrointestinal:  Negative for abdominal pain, blood in stool, constipation, diarrhea, heartburn, melena, nausea and vomiting.   Genitourinary:  Negative for dysuria, frequency, hematuria and urgency.   Musculoskeletal:  Negative for back pain, joint pain and myalgias.   Skin:  Negative for rash.   Neurological:  Negative for dizziness, tingling, weakness and headaches.   Endo/Heme/Allergies:  Does not bruise/bleed easily.   Psychiatric/Behavioral:  The patient is not nervous/anxious and does not have insomnia.          Physical Exam:  Vitals:    06/26/25 0858   BP: 126/64   Pulse: 60   Temp: 36.3 °C (97.3 °F)   TempSrc: Temporal   SpO2: 97%   Weight: 66.7 kg (147 lb)   Height: 1.61 m (5' 3.39\")       DESC; KARNOFSKY SCALE WITH ECOG EQUIVALENT: 100, Fully active, able to carry on all pre-disease performed without restriction (ECOG equivalent 0)    DISTRESS LEVEL: no apparent distress    Physical Exam  Vitals and nursing note reviewed. "   Constitutional:       General: She is awake. She is not in acute distress.     Appearance: Normal appearance. She is normal weight. She is not ill-appearing, toxic-appearing or diaphoretic.   HENT:      Head: Normocephalic and atraumatic.      Nose: Nose normal. No congestion.      Mouth/Throat:      Pharynx: Oropharynx is clear. No oropharyngeal exudate or posterior oropharyngeal erythema.   Eyes:      General: No scleral icterus.     Extraocular Movements: Extraocular movements intact.      Conjunctiva/sclera: Conjunctivae normal.      Pupils: Pupils are equal, round, and reactive to light.   Cardiovascular:      Rate and Rhythm: Normal rate and regular rhythm.      Pulses: Normal pulses.      Heart sounds: Normal heart sounds. No murmur heard.     No friction rub. No gallop.   Pulmonary:      Effort: Pulmonary effort is normal.      Breath sounds: Normal breath sounds. No decreased air movement. No wheezing, rhonchi or rales.   Abdominal:      General: Bowel sounds are normal. There is no distension.      Tenderness: There is no abdominal tenderness.   Musculoskeletal:         General: No deformity. Normal range of motion.      Cervical back: Normal range of motion and neck supple. No tenderness.      Right lower leg: No edema.      Left lower leg: No edema.   Lymphadenopathy:      Cervical: No cervical adenopathy.      Upper Body:      Right upper body: No axillary adenopathy.      Left upper body: No axillary adenopathy.      Lower Body: No right inguinal adenopathy. No left inguinal adenopathy.   Skin:     General: Skin is warm and dry.      Coloration: Skin is not jaundiced.      Findings: No erythema or rash.   Neurological:      General: No focal deficit present.      Mental Status: She is alert and oriented to person, place, and time.      Sensory: Sensation is intact.      Motor: Motor function is intact. No weakness.      Gait: Gait is intact.   Psychiatric:         Attention and Perception: Attention  normal.         Mood and Affect: Mood normal.         Behavior: Behavior normal. Behavior is cooperative.         Thought Content: Thought content normal.         Judgment: Judgment normal.           Labs:  No visits with results within 7 Day(s) from this visit.   Latest known visit with results is:   Hospital Outpatient Visit on 06/13/2025   Component Date Value Ref Range Status    Glucose - Accu-Ck 06/13/2025 85  65 - 99 mg/dL Final       Imaging:     All listed images below have been independently reviewed by me. I agree with the findings as summarized below:    [id number] BASE TO MID-THIGH  Result Date: 6/13/2025 6/13/2025 2:03 PM HISTORY/REASON FOR EXAM:  Hodgkin's lymphoma, post treatment eval TECHNIQUE/EXAM DESCRIPTION AND NUMBER OF VIEWS: PET body imaging. Initially, 10.23 mCi F-18 FDG was administered intravenously under standardized conditions. Approximately 45 minutes after FDG administration, the patient was placed in the supine position on the PET CT table. Blood glucose level was 85 mg/dL. Low dose spiral CT imaging was performed from the skull apex to the mid thighs. PET imaging was then performed from the skull apex to the mid thighs. CT images, PET images, and PET/CT fused images were reviewed on a PACS 3D workstation. The limited non-contrast CT data are used primarily for attenuation correction and anatomic correlation.  Evaluation of solid organs and bowel are especially limited utilizing this technique. COMPARISON: 1/31/2025 FINDINGS: Head and neck: No abnormal focal FDG activity. Chest: No abnormal focal FDG activity.  Physiologic uptake in both breasts. Abdomen and pelvis: No abnormal focal FDG activity. Musculoskeletal: No abnormal focal FDG activity. Incidental findings on CT: RIGHT chest infusion port.  IUD in place.     No abnormal metabolic activity in the neck, chest, abdomen or pelvis.  Deauville score 1.        Pathology:    FINAL DIAGNOSIS:     A. Left cervical lymph node:           Lymph node cores demonstrating classic Hodgkin lymphoma with           bands of fibrosis noted.     PRECURSOR AND MATURE LYMPHOID MALIGNANCIES     TUMOR    Site(s) of Tumor Involvement in Sample:  Lymph node     Final Integrated Diagnosis:  Classic Hodgkin lymphoma   SPECIAL STUDIES     Immunohistochemistry:  Please refer to microscopic description below     for details.     Flow Cytometry:  No aberrancy detected at level of sensitivity of     assay     Conventional Cytogenetics:  Not performed     Fluorescence in situ Hybridization:  Not performed     Molecular Alterations Detected:  Not performed.     Comment: This case has been reviewed by a second pathologist, Dr. Ivan, who concurs with the diagnosis.                                         Diagnosis performed by:                                       BETSEY BLACK MD     Assessment & Plan:  1. 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 She presents for evaluation.      Current Diagnosis and Staging: Classical Hodgkin's lymphoma, stage IIB    Update: Patient has had a CR. Will move to surveillance with labs and exam q 3 months and scans q 6 months for 2 years, and then labs and exam alone. Port to be removed.      Treatment Plan: Surveillance     Treatment Citation: NCCN     Plan of Care:     Primary Therapy: NA  Supportive Therapy: NA  Toxicity: NA  Labs: CBC with diff, CMP, LDH monitoring  Imaging: Repeat CT c/a/p 12/2025  Treatment Planning: Patient will go on surveillance with labs and exam q 3 months and scans q 6 months.   Consultations: Surgical oncology (Dr. Navarro)  Code Status: Full  Miscellaneous: NA  Return for Follow Up: 3 months    Any questions and concerns raised by the patient were answered to the best of my ability. Thank you for allowing me to participate in the care for this patient.  Please feel free to contact me for any questions or concerns.

## 2025-07-25 ENCOUNTER — HOSPITAL ENCOUNTER (OUTPATIENT)
Dept: RADIOLOGY | Facility: MEDICAL CENTER | Age: 25
End: 2025-07-25
Attending: STUDENT IN AN ORGANIZED HEALTH CARE EDUCATION/TRAINING PROGRAM
Payer: COMMERCIAL

## 2025-07-25 VITALS
SYSTOLIC BLOOD PRESSURE: 115 MMHG | HEART RATE: 83 BPM | OXYGEN SATURATION: 97 % | DIASTOLIC BLOOD PRESSURE: 73 MMHG | TEMPERATURE: 98.1 F | RESPIRATION RATE: 18 BRPM

## 2025-07-25 DIAGNOSIS — C81.72 OTHER CLASSICAL HODGKIN LYMPHOMA OF INTRATHORACIC LYMPH NODES (HCC): ICD-10-CM

## 2025-07-25 PROCEDURE — 32555 ASPIRATE PLEURA W/ IMAGING: CPT

## 2025-07-25 RX ORDER — LIDOCAINE HYDROCHLORIDE AND EPINEPHRINE 10; 10 MG/ML; UG/ML
INJECTION, SOLUTION INFILTRATION; PERINEURAL
Status: DISCONTINUED
Start: 2025-07-25 | End: 2025-07-26 | Stop reason: HOSPADM

## 2025-07-25 RX ORDER — 0.9 % SODIUM CHLORIDE 0.9 %
VIAL (ML) INJECTION PRN
OUTPATIENT
Start: 2025-09-19

## 2025-07-25 RX ORDER — 0.9 % SODIUM CHLORIDE 0.9 %
VIAL (ML) INJECTION PRN
OUTPATIENT
Start: 2025-07-25

## 2025-07-25 RX ORDER — 0.9 % SODIUM CHLORIDE 0.9 %
VIAL (ML) INJECTION PRN
OUTPATIENT
Start: 2025-08-22

## 2025-07-25 ASSESSMENT — PAIN DESCRIPTION - PAIN TYPE: TYPE: ACUTE PAIN

## 2025-07-25 NOTE — PROGRESS NOTES
Pt arrived from procedure. Right chest incision site CDI, covered with guaze and tegaderm. RN went over d/c instructions. All questions/concerns answered. VSS. Pt d/c.

## 2025-07-25 NOTE — PROGRESS NOTES
Pt presents to IR. Pt  was consented by MD at bedside, confirmed by this RN and consent at bedside. Pt transferred to IR table in supine position. Patient underwent a port removal  by Dr. Mcdaniel . Procedure site was marked by MD and verified using imaging guidance. Pt placed on monitor, prepped and draped in a sterile fashion. Vitals were taken every 5 minutes and remained stable during procedure (see doc flow sheet for results). CO2 waveform capnography was monitored and remained WNL throughout procedure. Report called to paula NORIEGA. Pt transported by stretcher with RN to recovery .

## 2025-07-26 ENCOUNTER — TELEPHONE (OUTPATIENT)
Dept: ONCOLOGY | Facility: MEDICAL CENTER | Age: 25
End: 2025-07-26
Payer: COMMERCIAL

## 2025-07-30 ENCOUNTER — OFFICE VISIT (OUTPATIENT)
Dept: MEDICAL GROUP | Facility: IMAGING CENTER | Age: 25
End: 2025-07-30
Payer: COMMERCIAL

## 2025-07-30 VITALS
WEIGHT: 136.6 LBS | RESPIRATION RATE: 14 BRPM | SYSTOLIC BLOOD PRESSURE: 102 MMHG | BODY MASS INDEX: 24.2 KG/M2 | TEMPERATURE: 98.2 F | OXYGEN SATURATION: 96 % | HEIGHT: 63 IN | HEART RATE: 78 BPM | DIASTOLIC BLOOD PRESSURE: 62 MMHG

## 2025-07-30 DIAGNOSIS — R03.1 LOW BLOOD PRESSURE READING: ICD-10-CM

## 2025-07-30 DIAGNOSIS — R19.8 ALTERNATING CONSTIPATION AND DIARRHEA: Primary | ICD-10-CM

## 2025-07-30 DIAGNOSIS — C81.72 OTHER CLASSICAL HODGKIN LYMPHOMA OF INTRATHORACIC LYMPH NODES (HCC): ICD-10-CM

## 2025-07-30 DIAGNOSIS — L50.9 URTICARIA: ICD-10-CM

## 2025-07-30 DIAGNOSIS — Z13.220 SCREENING FOR LIPID DISORDERS: ICD-10-CM

## 2025-07-30 ASSESSMENT — PATIENT HEALTH QUESTIONNAIRE - PHQ9
6. FEELING BAD ABOUT YOURSELF - OR THAT YOU ARE A FAILURE OR HAVE LET YOURSELF OR YOUR FAMILY DOWN: NOT AL ALL
5. POOR APPETITE OR OVEREATING: NOT AT ALL
9. THOUGHTS THAT YOU WOULD BE BETTER OFF DEAD, OR OF HURTING YOURSELF: NOT AT ALL
SUM OF ALL RESPONSES TO PHQ QUESTIONS 1-9: 0
8. MOVING OR SPEAKING SO SLOWLY THAT OTHER PEOPLE COULD HAVE NOTICED. OR THE OPPOSITE, BEING SO FIGETY OR RESTLESS THAT YOU HAVE BEEN MOVING AROUND A LOT MORE THAN USUAL: NOT AT ALL
1. LITTLE INTEREST OR PLEASURE IN DOING THINGS: NOT AT ALL
SUM OF ALL RESPONSES TO PHQ9 QUESTIONS 1 AND 2: 0
2. FEELING DOWN, DEPRESSED, IRRITABLE, OR HOPELESS: NOT AT ALL
7. TROUBLE CONCENTRATING ON THINGS, SUCH AS READING THE NEWSPAPER OR WATCHING TELEVISION: NOT AT ALL
3. TROUBLE FALLING OR STAYING ASLEEP OR SLEEPING TOO MUCH: NOT AT ALL
4. FEELING TIRED OR HAVING LITTLE ENERGY: NOT AT ALL

## 2025-07-30 ASSESSMENT — FIBROSIS 4 INDEX: FIB4 SCORE: 0.4

## 2025-07-30 NOTE — PROGRESS NOTES
Subjective:     CC: No chief complaint on file.      HPI:     Verbal consent was acquired by the patient to use [employer] ambient listening note generation during this visit: Yest    myla Menendez, 24 y.o., female,  presents today to discuss:     History of Present Illness      She reports experiencing both constipation and diarrhea, with a decrease in the frequency of her bowel movements. She has not observed any blood in her stool but notes the presence of black dots when wiping, which she believes to be undigested food. She is not currently taking Pepto-Bismol or iron supplements. She is seeking a referral for a colonoscopy due to these issues and her previous cancer diagnosis.    She has lost approximately 20 pounds since discontinuing chemotherapy, which she underwent for 6 months, ending on 05/31/2025. Her recent scans have shown no signs of cancer recurrence. She had her port removed last Friday and is concerned about swelling at the site, which was exacerbated by her dog stepping on it.    She has been experiencing intermittent hives, which she suspects may be due to outdoor allergies. The hives typically appear upon waking and are located around her eyes and upper chest. They are occasionally itchy and feel warm to touch. She takes Benadryl when her eyes are  swollen, which provides some relief, but the hives usually persist for about 2 days. She is requesting an allergy test to determine the cause of these symptoms.    She has been experiencing low blood pressure recently, often feeling dizzy and as if she might faint during walks. She does not own a blood pressure cuff. She has never actually fainted but needs to pause and regain her composure. She does not have a history of vertigo. She takes propranolol as needed.    She is requesting a referral for gynecology for a Pap smear.           ROS:  See HPI    Medications, allergies, past medical history, family history, surgical  "history, and social history documented in chart and reviewed by me.       Objective:   Exam:  /62 (BP Location: Left arm, Patient Position: Standing, BP Cuff Size: Adult)   Pulse 78   Temp 36.8 °C (98.2 °F) (Temporal)   Resp 14   Ht 1.61 m (5' 3.39\")   Wt 62 kg (136 lb 9.6 oz)   SpO2 96%   BMI 23.90 kg/m²      General: In no acute distress. Normal appearance.   Head:   Atraumatic, normocephalic.   Neck: Supple without lymphadenopathy.   Pulmonary: Normal effort, clear to auscultation bilaterally, no wheezes, rhonchi, or rales  Cardiovascular:   Regular rate and rhythm. No murmurs, rubs, or gallops.  Musculoskeletal:  Normal ROM. No edema.    Skin: No visible rashes or lesions.  Neurological: Alert and oriented to person, place, and time. Gait normal.   Psychiatric: Normal mood and affect. Calm and friendly behavior. Speech clear. Normal judgement and insight.         Assessment & Plan:       Assessment & Plan    1. Alternating constipation and diarrhea (Primary)  Chronic. Advised to take probiotics to aid digestion. Over-the-counter medications such as MiraLAX, Colace, magnesium citrate, or enemas can be used for constipation. For diarrhea, Pepto-Bismol or Imodium can be taken, provided there are no symptoms of fever, chills, or blood in the stools. Fiber supplement like Metamucil recommended to regulate bowel movements. If no response from the referral department within 2 weeks, she should inform us. A referral to gastroenterology was made. Recommend to update TSH to rule out thyroid disease.  - Referral to Gastroenterology  - TSH WITH REFLEX TO FT4; Future    2. Low blood pressure reading  Acute.  Supine blood pressure was 118/68, while sitting was 112/64, and 102/62 while standing.  Recommend Holter monitor to rule out cardiac arrhythmia.  In addition, a referral for 24-hour blood pressure monitoring was  made.  Most recent CBC conducted in May was remarkable for leukopenia which suspect secondary to " chemo. Most recent chemistry panel was normal.  Recommend updating TSH.  Recommend slow position changes.  Consider referral to cardiology.  - Cardiac Event Monitor; Future  - Referral to 24-Hour Blood Pressure Monitoring  - TSH WITH REFLEX TO FT4; Future    3. Urticaria  Recurrent issue. Suspect allergic reaction. Advised to continue using Benadryl with caution due to potential drowsiness. Instead recommend other allergy medications such as Zyrtec, Allegra, or Claritin. If condition worsens, particularly if breathing is compromised, facial or lip swelling occurs, or if there is difficulty talking or swallowing, she should call 911 immediately as these could be signs of an anaphylactic reaction. Blood work will be done to check for allergies.  If insurance does not cover the tests recommend referral to an allergist.  - ALLERGY PROFILE, FOOD  - ALLERGY ZONE 15    4. 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  Managed by hematology-oncology.  Patient completed chemotherapy in May.  Recommend to follow-up with hematology-oncology as recommended.    5. Screening for lipid disorders  - Lipid Profile; Future            Diagnosis and treatment plan explained to pt.  Pt agreed with treatment plan and verbalized understanding. All questions were answered to the best of my ability.     Return for f/u pending test results.     Please note that this dictation was created using voice recognition software. I have made every reasonable attempt to correct obvious errors, but I expect that there are errors of grammar and possibly content that I did not discover before finalizing the note.    Margie Rhodes PA-C  [facility]

## 2025-07-30 NOTE — Clinical Note
REFERRAL APPROVAL NOTICE         Sent on July 30, 2025                   Albert Melania Chi Gillette  [address]  [place] [address]                   Dear MsSamantha Gillette,    After a careful review of the medical information and benefit coverage, [facility] has processed your referral. See below for additional details.    If applicable, you must be actively enrolled with your insurance for coverage of the authorized service. If you have any questions regarding your coverage, please contact your insurance directly.    REFERRAL INFORMATION   Referral #:  [id number]  Referred-To Department    Referred-By Provider:  Vascular Medicine    Margie Rhodes P.A.-C.   Vascular Medicine      [address] [place] [place] [address]  [phone] [address] [place] [address]  [place] [address]  [phone]    Referral Start Date:  07/30/2025  Referral End Date:   07/30/2026             SCHEDULING  If you do not already have an appointment, please call [phone] to make an appointment.     MORE INFORMATION  If you do not already have a [employer] account, sign up at: [email or web address]  You can access your medical information, make appointments, see lab results, billing information, and more.  If you have questions regarding this referral, please contact  the [facility] Referrals department at:             [phone]. Monday - Friday 8:00AM - 5:00PM.     Sincerely,    [facility]

## 2025-08-08 ENCOUNTER — NON-PROVIDER VISIT (OUTPATIENT)
Dept: OCCUPATIONAL MEDICINE | Facility: CLINIC | Age: 25
End: 2025-08-08

## 2025-08-08 DIAGNOSIS — Z11.1 ENCOUNTER FOR PPD TEST: Primary | ICD-10-CM

## 2025-08-08 PROCEDURE — 86580 TB INTRADERMAL TEST: CPT | Performed by: NURSE PRACTITIONER

## 2025-08-10 ENCOUNTER — NON-PROVIDER VISIT (OUTPATIENT)
Dept: URGENT CARE | Facility: CLINIC | Age: 25
End: 2025-08-10

## 2025-08-10 LAB — TB WHEAL 3D P 5 TU DIAM: 0 MM

## 2025-08-12 ENCOUNTER — TELEPHONE (OUTPATIENT)
Dept: VASCULAR LAB | Facility: MEDICAL CENTER | Age: 25
End: 2025-08-12
Payer: COMMERCIAL

## 2025-08-13 ENCOUNTER — APPOINTMENT (OUTPATIENT)
Dept: VASCULAR LAB | Facility: MEDICAL CENTER | Age: 25
End: 2025-08-13
Attending: STUDENT IN AN ORGANIZED HEALTH CARE EDUCATION/TRAINING PROGRAM
Payer: COMMERCIAL

## 2025-08-14 ENCOUNTER — TELEPHONE (OUTPATIENT)
Dept: HEALTH INFORMATION MANAGEMENT | Facility: OTHER | Age: 25
End: 2025-08-14

## 2025-08-14 ENCOUNTER — RESULTS FOLLOW-UP (OUTPATIENT)
Dept: MEDICAL GROUP | Facility: IMAGING CENTER | Age: 25
End: 2025-08-14

## 2025-08-14 ENCOUNTER — HOSPITAL ENCOUNTER (OUTPATIENT)
Dept: LAB | Facility: MEDICAL CENTER | Age: 25
End: 2025-08-14
Attending: STUDENT IN AN ORGANIZED HEALTH CARE EDUCATION/TRAINING PROGRAM
Payer: COMMERCIAL

## 2025-08-14 ENCOUNTER — OFFICE VISIT (OUTPATIENT)
Dept: MEDICAL GROUP | Facility: IMAGING CENTER | Age: 25
End: 2025-08-14
Payer: COMMERCIAL

## 2025-08-14 ENCOUNTER — NON-PROVIDER VISIT (OUTPATIENT)
Dept: CARDIOLOGY | Facility: MEDICAL CENTER | Age: 25
End: 2025-08-14
Attending: STUDENT IN AN ORGANIZED HEALTH CARE EDUCATION/TRAINING PROGRAM
Payer: COMMERCIAL

## 2025-08-14 VITALS
HEIGHT: 63 IN | RESPIRATION RATE: 14 BRPM | OXYGEN SATURATION: 100 % | SYSTOLIC BLOOD PRESSURE: 108 MMHG | BODY MASS INDEX: 24.24 KG/M2 | HEART RATE: 102 BPM | TEMPERATURE: 98.3 F | DIASTOLIC BLOOD PRESSURE: 70 MMHG | WEIGHT: 136.8 LBS

## 2025-08-14 DIAGNOSIS — R42 LIGHTHEADEDNESS: ICD-10-CM

## 2025-08-14 DIAGNOSIS — R55 SYNCOPE, UNSPECIFIED SYNCOPE TYPE: Primary | ICD-10-CM

## 2025-08-14 DIAGNOSIS — R03.1 LOW BLOOD PRESSURE READING: ICD-10-CM

## 2025-08-14 DIAGNOSIS — E78.5 DYSLIPIDEMIA: ICD-10-CM

## 2025-08-14 DIAGNOSIS — R19.8 ALTERNATING CONSTIPATION AND DIARRHEA: ICD-10-CM

## 2025-08-14 DIAGNOSIS — Z13.220 SCREENING FOR LIPID DISORDERS: ICD-10-CM

## 2025-08-14 DIAGNOSIS — R55 SYNCOPE AND COLLAPSE: Primary | ICD-10-CM

## 2025-08-14 LAB
ALBUMIN SERPL BCP-MCNC: 4.8 G/DL (ref 3.2–4.9)
ALBUMIN/GLOB SERPL: 1.9 G/DL
ALP SERPL-CCNC: 68 U/L (ref 30–99)
ALT SERPL-CCNC: 24 U/L (ref 2–50)
ANION GAP SERPL CALC-SCNC: 13 MMOL/L (ref 7–16)
AST SERPL-CCNC: 55 U/L (ref 12–45)
BILIRUB SERPL-MCNC: 0.5 MG/DL (ref 0.1–1.5)
BUN SERPL-MCNC: 11 MG/DL (ref 8–22)
CALCIUM ALBUM COR SERPL-MCNC: 9.1 MG/DL (ref 8.5–10.5)
CALCIUM SERPL-MCNC: 9.7 MG/DL (ref 8.5–10.5)
CHLORIDE SERPL-SCNC: 105 MMOL/L (ref 96–112)
CHOLEST SERPL-MCNC: 189 MG/DL (ref 100–199)
CO2 SERPL-SCNC: 22 MMOL/L (ref 20–33)
CREAT SERPL-MCNC: 0.76 MG/DL (ref 0.5–1.4)
GFR SERPLBLD CREATININE-BSD FMLA CKD-EPI: 111 ML/MIN/1.73 M 2
GLOBULIN SER CALC-MCNC: 2.5 G/DL (ref 1.9–3.5)
GLUCOSE BLD-MCNC: 71 MG/DL (ref 65–99)
GLUCOSE SERPL-MCNC: 85 MG/DL (ref 65–99)
HBA1C MFR BLD: 5 % (ref ?–5.8)
HDLC SERPL-MCNC: 50 MG/DL
LDLC SERPL CALC-MCNC: 129 MG/DL
POCT INT CON NEG: NEGATIVE
POCT INT CON POS: POSITIVE
POTASSIUM SERPL-SCNC: 4.2 MMOL/L (ref 3.6–5.5)
PROT SERPL-MCNC: 7.3 G/DL (ref 6–8.2)
SODIUM SERPL-SCNC: 140 MMOL/L (ref 135–145)
TRIGL SERPL-MCNC: 48 MG/DL (ref 0–149)
TSH SERPL DL<=0.005 MIU/L-ACNC: 1.34 UIU/ML (ref 0.38–5.33)

## 2025-08-14 PROCEDURE — 80061 LIPID PANEL: CPT

## 2025-08-14 PROCEDURE — 86008 ALLG SPEC IGE RECOMB EA: CPT

## 2025-08-14 PROCEDURE — 93000 ELECTROCARDIOGRAM COMPLETE: CPT | Performed by: STUDENT IN AN ORGANIZED HEALTH CARE EDUCATION/TRAINING PROGRAM

## 2025-08-14 PROCEDURE — 83036 HEMOGLOBIN GLYCOSYLATED A1C: CPT | Performed by: STUDENT IN AN ORGANIZED HEALTH CARE EDUCATION/TRAINING PROGRAM

## 2025-08-14 PROCEDURE — 3078F DIAST BP <80 MM HG: CPT | Performed by: STUDENT IN AN ORGANIZED HEALTH CARE EDUCATION/TRAINING PROGRAM

## 2025-08-14 PROCEDURE — 82962 GLUCOSE BLOOD TEST: CPT | Performed by: STUDENT IN AN ORGANIZED HEALTH CARE EDUCATION/TRAINING PROGRAM

## 2025-08-14 PROCEDURE — 36415 COLL VENOUS BLD VENIPUNCTURE: CPT

## 2025-08-14 PROCEDURE — 93246 EXT ECG>7D<15D RECORDING: CPT

## 2025-08-14 PROCEDURE — 84443 ASSAY THYROID STIM HORMONE: CPT

## 2025-08-14 PROCEDURE — 3074F SYST BP LT 130 MM HG: CPT | Performed by: STUDENT IN AN ORGANIZED HEALTH CARE EDUCATION/TRAINING PROGRAM

## 2025-08-14 PROCEDURE — 80053 COMPREHEN METABOLIC PANEL: CPT

## 2025-08-14 PROCEDURE — 82785 ASSAY OF IGE: CPT

## 2025-08-14 PROCEDURE — 86003 ALLG SPEC IGE CRUDE XTRC EA: CPT | Mod: 91

## 2025-08-14 PROCEDURE — 99214 OFFICE O/P EST MOD 30 MIN: CPT | Mod: 25 | Performed by: STUDENT IN AN ORGANIZED HEALTH CARE EDUCATION/TRAINING PROGRAM

## 2025-08-14 ASSESSMENT — LIFESTYLE VARIABLES
HOW OFTEN DO YOU HAVE A DRINK CONTAINING ALCOHOL: MONTHLY OR LESS
HOW OFTEN DO YOU HAVE SIX OR MORE DRINKS ON ONE OCCASION: NEVER
AUDIT-C TOTAL SCORE: 2
SKIP TO QUESTIONS 9-10: 0
HOW MANY STANDARD DRINKS CONTAINING ALCOHOL DO YOU HAVE ON A TYPICAL DAY: 3 OR 4

## 2025-08-14 ASSESSMENT — FIBROSIS 4 INDEX: FIB4 SCORE: 0.4

## 2025-08-15 ENCOUNTER — NON-PROVIDER VISIT (OUTPATIENT)
Dept: OCCUPATIONAL MEDICINE | Facility: CLINIC | Age: 25
End: 2025-08-15

## 2025-08-15 DIAGNOSIS — Z11.1 ENCOUNTER FOR PPD TEST: Primary | ICD-10-CM

## 2025-08-15 PROCEDURE — 86580 TB INTRADERMAL TEST: CPT | Performed by: NURSE PRACTITIONER

## 2025-08-17 ENCOUNTER — NON-PROVIDER VISIT (OUTPATIENT)
Dept: URGENT CARE | Facility: CLINIC | Age: 25
End: 2025-08-17

## 2025-08-17 LAB
A ALTERNATA IGE QN: <0.1 KU/L
A FUMIGATUS IGE QN: <0.1 KU/L
BERMUDA GRASS IGE QN: <0.1 KU/L
BOXELDER IGE QN: <0.1 KU/L
C SPHAEROSPERMUM IGE QN: <0.1 KU/L
CAT DANDER IGE QN: <0.1 KU/L
CMN PIGWEED IGE QN: <0.1 KU/L
COMMON RAGWEED IGE QN: <0.1 KU/L
COTTONWOOD IGE QN: <0.1 KU/L
D FARINAE IGE QN: <0.1 KU/L
D PTERONYSS IGE QN: <0.1 KU/L
DEPRECATED MISC ALLERGEN IGE RAST QL: NORMAL
DOG DANDER IGE QN: <0.1 KU/L
IGE SERPL-ACNC: 6 KU/L
M RACEMOSUS IGE QN: <0.1 KU/L
MOUSE EPITH IGE QN: <0.1 KU/L
MT JUNIPER IGE QN: <0.1 KU/L
MUGWORT IGE QN: <0.1 KU/L
OLIVE POLN IGE QN: <0.1 KU/L
P NOTATUM IGE QN: <0.1 KU/L
ROACH IGE QN: <0.1 KU/L
SALTWORT IGE QN: <0.1 KU/L
TB WHEAL 3D P 5 TU DIAM: 0 MM
TIMOTHY IGE QN: <0.1 KU/L
WHITE ELM IGE QN: <0.1 KU/L
WHITE MULBERRY IGE QN: <0.1 KU/L
WHITE OAK IGE QN: <0.1 KU/L

## 2025-08-21 ENCOUNTER — HOSPITAL ENCOUNTER (OUTPATIENT)
Facility: MEDICAL CENTER | Age: 25
End: 2025-08-21
Attending: STUDENT IN AN ORGANIZED HEALTH CARE EDUCATION/TRAINING PROGRAM
Payer: COMMERCIAL

## 2025-08-21 ENCOUNTER — OFFICE VISIT (OUTPATIENT)
Dept: MEDICAL GROUP | Facility: IMAGING CENTER | Age: 25
End: 2025-08-21
Payer: COMMERCIAL

## 2025-08-21 VITALS
HEIGHT: 63 IN | SYSTOLIC BLOOD PRESSURE: 104 MMHG | WEIGHT: 137 LBS | HEART RATE: 84 BPM | BODY MASS INDEX: 24.27 KG/M2 | RESPIRATION RATE: 14 BRPM | DIASTOLIC BLOOD PRESSURE: 62 MMHG | TEMPERATURE: 98.4 F | OXYGEN SATURATION: 99 %

## 2025-08-21 DIAGNOSIS — Z23 ENCOUNTER FOR ADMINISTRATION OF VACCINE: ICD-10-CM

## 2025-08-21 DIAGNOSIS — Z01.411 ENCOUNTER FOR GYNECOLOGICAL EXAMINATION (GENERAL) (ROUTINE) WITH ABNORMAL FINDINGS: ICD-10-CM

## 2025-08-21 DIAGNOSIS — Z97.5 IUD (INTRAUTERINE DEVICE) IN PLACE: ICD-10-CM

## 2025-08-21 DIAGNOSIS — Z01.411 ENCOUNTER FOR GYNECOLOGICAL EXAMINATION (GENERAL) (ROUTINE) WITH ABNORMAL FINDINGS: Primary | ICD-10-CM

## 2025-08-21 DIAGNOSIS — N94.10 DYSPAREUNIA IN FEMALE: ICD-10-CM

## 2025-08-21 LAB — TEST NAME 95000: NORMAL

## 2025-08-21 PROCEDURE — 88175 CYTOPATH C/V AUTO FLUID REDO: CPT

## 2025-08-21 PROCEDURE — 90677 PCV20 VACCINE IM: CPT | Performed by: STUDENT IN AN ORGANIZED HEALTH CARE EDUCATION/TRAINING PROGRAM

## 2025-08-21 PROCEDURE — 3074F SYST BP LT 130 MM HG: CPT | Performed by: STUDENT IN AN ORGANIZED HEALTH CARE EDUCATION/TRAINING PROGRAM

## 2025-08-21 PROCEDURE — 3078F DIAST BP <80 MM HG: CPT | Performed by: STUDENT IN AN ORGANIZED HEALTH CARE EDUCATION/TRAINING PROGRAM

## 2025-08-21 PROCEDURE — 87591 N.GONORRHOEAE DNA AMP PROB: CPT

## 2025-08-21 PROCEDURE — 99395 PREV VISIT EST AGE 18-39: CPT | Mod: 25 | Performed by: STUDENT IN AN ORGANIZED HEALTH CARE EDUCATION/TRAINING PROGRAM

## 2025-08-21 PROCEDURE — 99459 PELVIC EXAMINATION: CPT | Performed by: STUDENT IN AN ORGANIZED HEALTH CARE EDUCATION/TRAINING PROGRAM

## 2025-08-21 PROCEDURE — 87491 CHLMYD TRACH DNA AMP PROBE: CPT

## 2025-08-21 PROCEDURE — 90471 IMMUNIZATION ADMIN: CPT | Performed by: STUDENT IN AN ORGANIZED HEALTH CARE EDUCATION/TRAINING PROGRAM

## 2025-08-21 ASSESSMENT — FIBROSIS 4 INDEX: FIB4 SCORE: 0.89

## 2025-08-21 ASSESSMENT — LIFESTYLE VARIABLES
SKIP TO QUESTIONS 9-10: 0
HOW OFTEN DO YOU HAVE A DRINK CONTAINING ALCOHOL: MONTHLY OR LESS
AUDIT-C TOTAL SCORE: 2
HOW OFTEN DO YOU HAVE SIX OR MORE DRINKS ON ONE OCCASION: NEVER
HOW MANY STANDARD DRINKS CONTAINING ALCOHOL DO YOU HAVE ON A TYPICAL DAY: 3 OR 4

## 2025-08-22 LAB
C TRACH DNA GENITAL QL NAA+PROBE: NEGATIVE
N GONORRHOEA DNA GENITAL QL NAA+PROBE: NEGATIVE
SPECIMEN SOURCE: NORMAL

## 2025-08-25 ENCOUNTER — NON-PROVIDER VISIT (OUTPATIENT)
Dept: VASCULAR LAB | Facility: MEDICAL CENTER | Age: 25
End: 2025-08-25
Attending: STUDENT IN AN ORGANIZED HEALTH CARE EDUCATION/TRAINING PROGRAM
Payer: COMMERCIAL

## 2025-08-25 ENCOUNTER — RESULTS FOLLOW-UP (OUTPATIENT)
Dept: MEDICAL GROUP | Facility: IMAGING CENTER | Age: 25
End: 2025-08-25

## 2025-08-25 VITALS
SYSTOLIC BLOOD PRESSURE: 104 MMHG | HEART RATE: 77 BPM | BODY MASS INDEX: 23.39 KG/M2 | DIASTOLIC BLOOD PRESSURE: 74 MMHG | HEIGHT: 64 IN | WEIGHT: 137 LBS

## 2025-08-25 PROCEDURE — 93788 AMBL BP MNTR W/SW A/R: CPT

## 2025-08-25 PROCEDURE — 93786 AMBL BP MNTR W/SW REC ONLY: CPT

## 2025-08-25 PROCEDURE — 99214 OFFICE O/P EST MOD 30 MIN: CPT | Performed by: INTERNAL MEDICINE

## 2025-08-25 ASSESSMENT — FIBROSIS 4 INDEX: FIB4 SCORE: 0.89

## 2025-08-26 LAB — THINPREP PAP, CYTOLOGY NL11781: NORMAL

## 2025-09-09 ENCOUNTER — APPOINTMENT (OUTPATIENT)
Dept: MEDICAL GROUP | Facility: IMAGING CENTER | Age: 25
End: 2025-09-09
Payer: COMMERCIAL